# Patient Record
Sex: FEMALE | Race: WHITE | URBAN - METROPOLITAN AREA
[De-identification: names, ages, dates, MRNs, and addresses within clinical notes are randomized per-mention and may not be internally consistent; named-entity substitution may affect disease eponyms.]

---

## 2017-04-25 ENCOUNTER — OUTPATIENT (OUTPATIENT)
Dept: OUTPATIENT SERVICES | Facility: HOSPITAL | Age: 57
LOS: 1 days | Discharge: HOME | End: 2017-04-25

## 2017-06-28 DIAGNOSIS — Z12.31 ENCOUNTER FOR SCREENING MAMMOGRAM FOR MALIGNANT NEOPLASM OF BREAST: ICD-10-CM

## 2017-10-19 ENCOUNTER — OUTPATIENT (OUTPATIENT)
Dept: OUTPATIENT SERVICES | Facility: HOSPITAL | Age: 57
LOS: 1 days | Discharge: HOME | End: 2017-10-19

## 2017-10-19 DIAGNOSIS — E05.90 THYROTOXICOSIS, UNSPECIFIED WITHOUT THYROTOXIC CRISIS OR STORM: ICD-10-CM

## 2017-10-19 DIAGNOSIS — R10.9 UNSPECIFIED ABDOMINAL PAIN: ICD-10-CM

## 2017-10-19 DIAGNOSIS — J18.9 PNEUMONIA, UNSPECIFIED ORGANISM: ICD-10-CM

## 2017-10-19 DIAGNOSIS — J45.909 UNSPECIFIED ASTHMA, UNCOMPLICATED: ICD-10-CM

## 2017-10-21 ENCOUNTER — EMERGENCY (EMERGENCY)
Facility: HOSPITAL | Age: 57
LOS: 0 days | Discharge: HOME | End: 2017-10-21

## 2017-10-21 DIAGNOSIS — M54.5 LOW BACK PAIN: ICD-10-CM

## 2017-10-21 DIAGNOSIS — Z91.09 OTHER ALLERGY STATUS, OTHER THAN TO DRUGS AND BIOLOGICAL SUBSTANCES: ICD-10-CM

## 2017-10-21 DIAGNOSIS — S39.012A STRAIN OF MUSCLE, FASCIA AND TENDON OF LOWER BACK, INITIAL ENCOUNTER: ICD-10-CM

## 2017-10-21 DIAGNOSIS — J18.9 PNEUMONIA, UNSPECIFIED ORGANISM: ICD-10-CM

## 2017-10-21 DIAGNOSIS — E05.90 THYROTOXICOSIS, UNSPECIFIED WITHOUT THYROTOXIC CRISIS OR STORM: ICD-10-CM

## 2017-10-21 DIAGNOSIS — K21.9 GASTRO-ESOPHAGEAL REFLUX DISEASE WITHOUT ESOPHAGITIS: ICD-10-CM

## 2017-10-21 DIAGNOSIS — E03.9 HYPOTHYROIDISM, UNSPECIFIED: ICD-10-CM

## 2017-10-21 DIAGNOSIS — J45.909 UNSPECIFIED ASTHMA, UNCOMPLICATED: ICD-10-CM

## 2017-10-21 DIAGNOSIS — Z79.899 OTHER LONG TERM (CURRENT) DRUG THERAPY: ICD-10-CM

## 2017-10-21 DIAGNOSIS — Z88.0 ALLERGY STATUS TO PENICILLIN: ICD-10-CM
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
## 2017-10-21 DIAGNOSIS — Z86.19 PERSONAL HISTORY OF OTHER INFECTIOUS AND PARASITIC DISEASES: ICD-10-CM

## 2017-11-05 ENCOUNTER — TRANSCRIPTION ENCOUNTER (OUTPATIENT)
Age: 57
End: 2017-11-05

## 2017-11-13 ENCOUNTER — OUTPATIENT (OUTPATIENT)
Dept: OUTPATIENT SERVICES | Facility: HOSPITAL | Age: 57
LOS: 1 days | Discharge: HOME | End: 2017-11-13

## 2017-11-13 DIAGNOSIS — J18.9 PNEUMONIA, UNSPECIFIED ORGANISM: ICD-10-CM

## 2017-11-13 DIAGNOSIS — E05.90 THYROTOXICOSIS, UNSPECIFIED WITHOUT THYROTOXIC CRISIS OR STORM: ICD-10-CM

## 2017-11-13 DIAGNOSIS — J45.909 UNSPECIFIED ASTHMA, UNCOMPLICATED: ICD-10-CM

## 2017-11-16 DIAGNOSIS — Z88.0 ALLERGY STATUS TO PENICILLIN: ICD-10-CM

## 2017-11-16 DIAGNOSIS — D12.8 BENIGN NEOPLASM OF RECTUM: ICD-10-CM

## 2017-11-16 DIAGNOSIS — D12.3 BENIGN NEOPLASM OF TRANSVERSE COLON: ICD-10-CM

## 2017-11-16 DIAGNOSIS — D13.1 BENIGN NEOPLASM OF STOMACH: ICD-10-CM

## 2017-11-16 DIAGNOSIS — D12.4 BENIGN NEOPLASM OF DESCENDING COLON: ICD-10-CM

## 2017-11-16 DIAGNOSIS — Z12.11 ENCOUNTER FOR SCREENING FOR MALIGNANT NEOPLASM OF COLON: ICD-10-CM

## 2017-11-16 DIAGNOSIS — K29.80 DUODENITIS WITHOUT BLEEDING: ICD-10-CM

## 2017-11-16 DIAGNOSIS — K64.4 RESIDUAL HEMORRHOIDAL SKIN TAGS: ICD-10-CM

## 2017-11-16 DIAGNOSIS — K29.50 UNSPECIFIED CHRONIC GASTRITIS WITHOUT BLEEDING: ICD-10-CM

## 2018-01-08 ENCOUNTER — OUTPATIENT (OUTPATIENT)
Dept: OUTPATIENT SERVICES | Facility: HOSPITAL | Age: 58
LOS: 1 days | Discharge: HOME | End: 2018-01-08

## 2018-01-08 DIAGNOSIS — J45.909 UNSPECIFIED ASTHMA, UNCOMPLICATED: ICD-10-CM

## 2018-01-08 DIAGNOSIS — E05.90 THYROTOXICOSIS, UNSPECIFIED WITHOUT THYROTOXIC CRISIS OR STORM: ICD-10-CM

## 2018-01-08 DIAGNOSIS — R10.9 UNSPECIFIED ABDOMINAL PAIN: ICD-10-CM

## 2018-01-08 DIAGNOSIS — J18.9 PNEUMONIA, UNSPECIFIED ORGANISM: ICD-10-CM

## 2018-06-15 ENCOUNTER — EMERGENCY (EMERGENCY)
Facility: HOSPITAL | Age: 58
LOS: 0 days | Discharge: HOME | End: 2018-06-15
Attending: EMERGENCY MEDICINE | Admitting: EMERGENCY MEDICINE

## 2018-06-15 VITALS
RESPIRATION RATE: 19 BRPM | SYSTOLIC BLOOD PRESSURE: 122 MMHG | TEMPERATURE: 98 F | OXYGEN SATURATION: 98 % | HEART RATE: 84 BPM | DIASTOLIC BLOOD PRESSURE: 60 MMHG

## 2018-06-15 DIAGNOSIS — Z91.018 ALLERGY TO OTHER FOODS: ICD-10-CM

## 2018-06-15 DIAGNOSIS — R53.1 WEAKNESS: ICD-10-CM

## 2018-06-15 DIAGNOSIS — R53.83 OTHER FATIGUE: ICD-10-CM

## 2018-06-15 DIAGNOSIS — J45.909 UNSPECIFIED ASTHMA, UNCOMPLICATED: ICD-10-CM

## 2018-06-15 DIAGNOSIS — Z88.0 ALLERGY STATUS TO PENICILLIN: ICD-10-CM

## 2018-06-15 DIAGNOSIS — J02.9 ACUTE PHARYNGITIS, UNSPECIFIED: ICD-10-CM

## 2018-06-15 LAB
ALBUMIN SERPL ELPH-MCNC: 4.1 G/DL — SIGNIFICANT CHANGE UP (ref 3.5–5.2)
ALP SERPL-CCNC: 89 U/L — SIGNIFICANT CHANGE UP (ref 30–115)
ALT FLD-CCNC: 39 U/L — SIGNIFICANT CHANGE UP (ref 0–41)
ANION GAP SERPL CALC-SCNC: 13 MMOL/L — SIGNIFICANT CHANGE UP (ref 7–14)
AST SERPL-CCNC: 31 U/L — SIGNIFICANT CHANGE UP (ref 0–41)
BILIRUB SERPL-MCNC: 0.3 MG/DL — SIGNIFICANT CHANGE UP (ref 0.2–1.2)
BUN SERPL-MCNC: 12 MG/DL — SIGNIFICANT CHANGE UP (ref 10–20)
CALCIUM SERPL-MCNC: 8.7 MG/DL — SIGNIFICANT CHANGE UP (ref 8.5–10.1)
CHLORIDE SERPL-SCNC: 100 MMOL/L — SIGNIFICANT CHANGE UP (ref 98–110)
CK MB CFR SERPL CALC: <1 NG/ML — SIGNIFICANT CHANGE UP (ref 0.6–6.3)
CK SERPL-CCNC: 61 U/L — SIGNIFICANT CHANGE UP (ref 0–225)
CO2 SERPL-SCNC: 28 MMOL/L — SIGNIFICANT CHANGE UP (ref 17–32)
CREAT SERPL-MCNC: 0.7 MG/DL — SIGNIFICANT CHANGE UP (ref 0.7–1.5)
GLUCOSE SERPL-MCNC: 109 MG/DL — HIGH (ref 70–99)
HCT VFR BLD CALC: 37.9 % — SIGNIFICANT CHANGE UP (ref 37–47)
HGB BLD-MCNC: 12.4 G/DL — SIGNIFICANT CHANGE UP (ref 12–16)
MCHC RBC-ENTMCNC: 30.5 PG — SIGNIFICANT CHANGE UP (ref 27–31)
MCHC RBC-ENTMCNC: 32.7 G/DL — SIGNIFICANT CHANGE UP (ref 32–37)
MCV RBC AUTO: 93.1 FL — SIGNIFICANT CHANGE UP (ref 81–99)
NRBC # BLD: 0 /100 WBCS — SIGNIFICANT CHANGE UP (ref 0–0)
PLATELET # BLD AUTO: 203 K/UL — SIGNIFICANT CHANGE UP (ref 130–400)
POTASSIUM SERPL-MCNC: 3.8 MMOL/L — SIGNIFICANT CHANGE UP (ref 3.5–5)
POTASSIUM SERPL-SCNC: 3.8 MMOL/L — SIGNIFICANT CHANGE UP (ref 3.5–5)
PROT SERPL-MCNC: 6.7 G/DL — SIGNIFICANT CHANGE UP (ref 6–8)
RBC # BLD: 4.07 M/UL — LOW (ref 4.2–5.4)
RBC # FLD: 12.7 % — SIGNIFICANT CHANGE UP (ref 11.5–14.5)
SODIUM SERPL-SCNC: 141 MMOL/L — SIGNIFICANT CHANGE UP (ref 135–146)
TROPONIN T SERPL-MCNC: <0.01 NG/ML — SIGNIFICANT CHANGE UP
WBC # BLD: 7.44 K/UL — SIGNIFICANT CHANGE UP (ref 4.8–10.8)
WBC # FLD AUTO: 7.44 K/UL — SIGNIFICANT CHANGE UP (ref 4.8–10.8)

## 2018-06-15 RX ORDER — SODIUM CHLORIDE 9 MG/ML
3 INJECTION INTRAMUSCULAR; INTRAVENOUS; SUBCUTANEOUS ONCE
Qty: 0 | Refills: 0 | Status: COMPLETED | OUTPATIENT
Start: 2018-06-15 | End: 2018-06-15

## 2018-06-15 RX ADMIN — SODIUM CHLORIDE 3 MILLILITER(S): 9 INJECTION INTRAMUSCULAR; INTRAVENOUS; SUBCUTANEOUS at 12:29

## 2018-06-15 NOTE — ED PROVIDER NOTE - PHYSICAL EXAMINATION
VITAL SIGNS: I have reviewed the initial vital signs.   CONSTITUTIONAL: Awake, alert. Well-developed; well-nourished; in no distress. Non-toxic appearing.   SKIN: No rash, vesicles/lesion, abrasions or lacerations. No ecchymosis or signs of trauma.   HEAD: Normocephalic; atraumatic.   EYES: Symmetrical, no discharge or signs of trauma. Conjunctiva and sclera clear.   ENT: Airway patent. MMM. Oropharynx clear with erythema, no exudates or tonsillar enlargement. Uvula midline.   NECK: Supple; non-tender. + shotty lymphadenopathy.   CARD: No chest wall deformity or tenderness. S1, S2 normal; no murmurs, gallops, or rubs. Regular rate and rhythm.  RESP: Good air movement. Lungs CTAB. No crackles, wheezes, rales or rhonchi.  ABD: Soft; non-distended; non-tender.   EXT: No bony deformity or tenderness. Normal ROM x 4 extremities. No LE edema.

## 2018-06-15 NOTE — ED PROVIDER NOTE - NS ED ROS FT
Except as documented in HPI, all other ROS negative.   GENERAL: Denies fever/chills, loss of appetite/weight or fatigue.  SKIN: Denies rashes, abrasions, lacerations, ecchymosis, erythema, or edema.  HEAD: Denies headache, dizziness or trauma.  EYES: Denies blurry vision, diplopia, or photophobia.  ENT: + sore throat.   CARDIAC: Denies chest pain, palpitations, or SOB.   RESPIRATORY: Denies SOB, cough, hemoptysis or wheezing.   GI: Denies abdominal pain, n/v/d.   : Denies hematuria, dysuria or frequency.   MSK: Denies myalgias, bony deformity or pain.   NEURO: + weakness, + fatigue.

## 2018-06-15 NOTE — ED PROVIDER NOTE - OBJECTIVE STATEMENT
Pt is a 58 y/o Female, PMHX of Asthma, hyperthyroidism, presents to ED w/ weakness and fatigue. Pt reports she has had hx of PNA twice in the past, both which presented with fatigue initially and the second one she was diagnosed with MRSA which had to be drained by pulmonary. Pt states 3 days ago she developed fever of 100.1 and sore throat. 2 days [place] [facility] [place] on Z-Gilberto (still currently taking) and has been taking Motrin/Tylenol for the fever. Pt states she's just worried she could have PNA again. Pt denies cough, chest pain, SOB, abdominal pain, n/v, paresthesias.

## 2018-07-10 ENCOUNTER — EMERGENCY (EMERGENCY)
Facility: HOSPITAL | Age: 58
LOS: 0 days | Discharge: HOME | End: 2018-07-11
Attending: EMERGENCY MEDICINE | Admitting: EMERGENCY MEDICINE

## 2018-07-10 VITALS
TEMPERATURE: 98 F | OXYGEN SATURATION: 99 % | SYSTOLIC BLOOD PRESSURE: 108 MMHG | DIASTOLIC BLOOD PRESSURE: 67 MMHG | HEART RATE: 78 BPM | RESPIRATION RATE: 20 BRPM

## 2018-07-10 DIAGNOSIS — R11.0 NAUSEA: ICD-10-CM

## 2018-07-10 DIAGNOSIS — Z79.899 OTHER LONG TERM (CURRENT) DRUG THERAPY: ICD-10-CM

## 2018-07-10 DIAGNOSIS — K65.4 SCLEROSING MESENTERITIS: ICD-10-CM

## 2018-07-10 DIAGNOSIS — E05.90 THYROTOXICOSIS, UNSPECIFIED WITHOUT THYROTOXIC CRISIS OR STORM: ICD-10-CM

## 2018-07-10 DIAGNOSIS — R50.9 FEVER, UNSPECIFIED: ICD-10-CM

## 2018-07-10 DIAGNOSIS — J45.909 UNSPECIFIED ASTHMA, UNCOMPLICATED: ICD-10-CM

## 2018-07-10 DIAGNOSIS — K21.9 GASTRO-ESOPHAGEAL REFLUX DISEASE WITHOUT ESOPHAGITIS: ICD-10-CM

## 2018-07-10 DIAGNOSIS — Z91.018 ALLERGY TO OTHER FOODS: ICD-10-CM

## 2018-07-10 DIAGNOSIS — Z88.0 ALLERGY STATUS TO PENICILLIN: ICD-10-CM

## 2018-07-10 DIAGNOSIS — R10.9 UNSPECIFIED ABDOMINAL PAIN: ICD-10-CM

## 2018-07-10 LAB
ALBUMIN SERPL ELPH-MCNC: 4.1 G/DL — SIGNIFICANT CHANGE UP (ref 3.5–5.2)
ALP SERPL-CCNC: 74 U/L — SIGNIFICANT CHANGE UP (ref 30–115)
ALT FLD-CCNC: 25 U/L — SIGNIFICANT CHANGE UP (ref 0–41)
ANION GAP SERPL CALC-SCNC: 12 MMOL/L — SIGNIFICANT CHANGE UP (ref 7–14)
AST SERPL-CCNC: 29 U/L — SIGNIFICANT CHANGE UP (ref 0–41)
BILIRUB DIRECT SERPL-MCNC: <0.2 MG/DL — SIGNIFICANT CHANGE UP (ref 0–0.2)
BILIRUB INDIRECT FLD-MCNC: >0.3 MG/DL — SIGNIFICANT CHANGE UP (ref 0.2–1.2)
BILIRUB SERPL-MCNC: 0.4 MG/DL — SIGNIFICANT CHANGE UP (ref 0.2–1.2)
BILIRUB SERPL-MCNC: 0.5 MG/DL — SIGNIFICANT CHANGE UP (ref 0.2–1.2)
BUN SERPL-MCNC: 14 MG/DL — SIGNIFICANT CHANGE UP (ref 10–20)
CALCIUM SERPL-MCNC: 8.9 MG/DL — SIGNIFICANT CHANGE UP (ref 8.5–10.1)
CHLORIDE SERPL-SCNC: 100 MMOL/L — SIGNIFICANT CHANGE UP (ref 98–110)
CO2 SERPL-SCNC: 28 MMOL/L — SIGNIFICANT CHANGE UP (ref 17–32)
CREAT SERPL-MCNC: 0.7 MG/DL — SIGNIFICANT CHANGE UP (ref 0.7–1.5)
GLUCOSE SERPL-MCNC: 107 MG/DL — HIGH (ref 70–99)
HCT VFR BLD CALC: 39.8 % — SIGNIFICANT CHANGE UP (ref 37–47)
HGB BLD-MCNC: 13.1 G/DL — SIGNIFICANT CHANGE UP (ref 12–16)
LIDOCAIN IGE QN: 42 U/L — SIGNIFICANT CHANGE UP (ref 7–60)
MCHC RBC-ENTMCNC: 30.6 PG — SIGNIFICANT CHANGE UP (ref 27–31)
MCHC RBC-ENTMCNC: 32.9 G/DL — SIGNIFICANT CHANGE UP (ref 32–37)
MCV RBC AUTO: 93 FL — SIGNIFICANT CHANGE UP (ref 81–99)
PLATELET # BLD AUTO: 166 K/UL — SIGNIFICANT CHANGE UP (ref 130–400)
POTASSIUM SERPL-MCNC: 4.7 MMOL/L — SIGNIFICANT CHANGE UP (ref 3.5–5)
POTASSIUM SERPL-SCNC: 4.7 MMOL/L — SIGNIFICANT CHANGE UP (ref 3.5–5)
PROT SERPL-MCNC: 6.6 G/DL — SIGNIFICANT CHANGE UP (ref 6–8)
RBC # BLD: 4.28 M/UL — SIGNIFICANT CHANGE UP (ref 4.2–5.4)
RBC # FLD: 12.9 % — SIGNIFICANT CHANGE UP (ref 11.5–14.5)
SODIUM SERPL-SCNC: 140 MMOL/L — SIGNIFICANT CHANGE UP (ref 135–146)
WBC # BLD: 6.91 K/UL — SIGNIFICANT CHANGE UP (ref 4.8–10.8)
WBC # FLD AUTO: 6.91 K/UL — SIGNIFICANT CHANGE UP (ref 4.8–10.8)

## 2018-07-10 RX ORDER — SODIUM CHLORIDE 9 MG/ML
1000 INJECTION INTRAMUSCULAR; INTRAVENOUS; SUBCUTANEOUS ONCE
Qty: 0 | Refills: 0 | Status: COMPLETED | OUTPATIENT
Start: 2018-07-10 | End: 2018-07-10

## 2018-07-10 RX ORDER — ONDANSETRON 8 MG/1
4 TABLET, FILM COATED ORAL ONCE
Qty: 0 | Refills: 0 | Status: COMPLETED | OUTPATIENT
Start: 2018-07-10 | End: 2018-07-10

## 2018-07-10 RX ORDER — KETOROLAC TROMETHAMINE 30 MG/ML
30 SYRINGE (ML) INJECTION ONCE
Qty: 0 | Refills: 0 | Status: DISCONTINUED | OUTPATIENT
Start: 2018-07-10 | End: 2018-07-10

## 2018-07-10 RX ADMIN — ONDANSETRON 4 MILLIGRAM(S): 8 TABLET, FILM COATED ORAL at 23:16

## 2018-07-10 RX ADMIN — SODIUM CHLORIDE 1000 MILLILITER(S): 9 INJECTION INTRAMUSCULAR; INTRAVENOUS; SUBCUTANEOUS at 23:16

## 2018-07-10 RX ADMIN — Medication 30 MILLIGRAM(S): at 23:16

## 2018-07-10 NOTE — ED ADULT NURSE NOTE - PMH
Asthma    Diverticulitis    Hyperthyroidism Asthma    Diverticulitis    Hiatal hernia    Hyperthyroidism    MRSA infection  right lung

## 2018-07-11 ENCOUNTER — EMERGENCY (EMERGENCY)
Facility: HOSPITAL | Age: 58
LOS: 0 days | Discharge: HOME | End: 2018-07-12
Attending: EMERGENCY MEDICINE | Admitting: EMERGENCY MEDICINE

## 2018-07-11 VITALS
TEMPERATURE: 99 F | RESPIRATION RATE: 18 BRPM | SYSTOLIC BLOOD PRESSURE: 102 MMHG | DIASTOLIC BLOOD PRESSURE: 48 MMHG | WEIGHT: 130.07 LBS | HEIGHT: 65 IN | HEART RATE: 86 BPM | OXYGEN SATURATION: 99 %

## 2018-07-11 VITALS
DIASTOLIC BLOOD PRESSURE: 58 MMHG | HEART RATE: 77 BPM | OXYGEN SATURATION: 97 % | RESPIRATION RATE: 16 BRPM | SYSTOLIC BLOOD PRESSURE: 120 MMHG

## 2018-07-11 DIAGNOSIS — R10.9 UNSPECIFIED ABDOMINAL PAIN: ICD-10-CM

## 2018-07-11 DIAGNOSIS — K59.00 CONSTIPATION, UNSPECIFIED: ICD-10-CM

## 2018-07-11 DIAGNOSIS — Z79.899 OTHER LONG TERM (CURRENT) DRUG THERAPY: ICD-10-CM

## 2018-07-11 DIAGNOSIS — Z91.018 ALLERGY TO OTHER FOODS: ICD-10-CM

## 2018-07-11 DIAGNOSIS — K65.4 SCLEROSING MESENTERITIS: ICD-10-CM

## 2018-07-11 DIAGNOSIS — Z88.0 ALLERGY STATUS TO PENICILLIN: ICD-10-CM

## 2018-07-11 LAB
APPEARANCE UR: CLEAR — SIGNIFICANT CHANGE UP
APTT BLD: 29.8 SEC — SIGNIFICANT CHANGE UP (ref 27–39.2)
BASOPHILS # BLD AUTO: 0.01 K/UL — SIGNIFICANT CHANGE UP (ref 0–0.2)
BASOPHILS NFR BLD AUTO: 0.1 % — SIGNIFICANT CHANGE UP (ref 0–1)
BILIRUB UR-MCNC: NEGATIVE — SIGNIFICANT CHANGE UP
COLOR SPEC: YELLOW — SIGNIFICANT CHANGE UP
DIFF PNL FLD: NEGATIVE — SIGNIFICANT CHANGE UP
EOSINOPHIL # BLD AUTO: 0.02 K/UL — SIGNIFICANT CHANGE UP (ref 0–0.7)
EOSINOPHIL NFR BLD AUTO: 0.3 % — SIGNIFICANT CHANGE UP (ref 0–8)
GLUCOSE UR QL: NEGATIVE MG/DL — SIGNIFICANT CHANGE UP
IMM GRANULOCYTES NFR BLD AUTO: 0.3 % — SIGNIFICANT CHANGE UP (ref 0.1–0.3)
INR BLD: 1.25 RATIO — SIGNIFICANT CHANGE UP (ref 0.65–1.3)
KETONES UR-MCNC: 15
LACTATE SERPL-SCNC: 1.6 MMOL/L — SIGNIFICANT CHANGE UP (ref 0.5–2.2)
LEUKOCYTE ESTERASE UR-ACNC: (no result)
LYMPHOCYTES # BLD AUTO: 0.28 K/UL — LOW (ref 1.2–3.4)
LYMPHOCYTES # BLD AUTO: 4.1 % — LOW (ref 20.5–51.1)
MONOCYTES # BLD AUTO: 0.56 K/UL — SIGNIFICANT CHANGE UP (ref 0.1–0.6)
MONOCYTES NFR BLD AUTO: 8.1 % — SIGNIFICANT CHANGE UP (ref 1.7–9.3)
NEUTROPHILS # BLD AUTO: 6.02 K/UL — SIGNIFICANT CHANGE UP (ref 1.4–6.5)
NEUTROPHILS NFR BLD AUTO: 87.1 % — HIGH (ref 42.2–75.2)
NITRITE UR-MCNC: NEGATIVE — SIGNIFICANT CHANGE UP
NRBC # BLD: 0 /100 WBCS — SIGNIFICANT CHANGE UP (ref 0–0)
PH UR: 6 — SIGNIFICANT CHANGE UP (ref 5–8)
PROT UR-MCNC: NEGATIVE MG/DL — SIGNIFICANT CHANGE UP
PROTHROM AB SERPL-ACNC: 13.5 SEC — HIGH (ref 9.95–12.87)
SP GR SPEC: 1.01 — SIGNIFICANT CHANGE UP (ref 1.01–1.03)
UROBILINOGEN FLD QL: 0.2 MG/DL — SIGNIFICANT CHANGE UP (ref 0.2–0.2)
WBC UR QL: SIGNIFICANT CHANGE UP /HPF

## 2018-07-11 RX ORDER — SODIUM CHLORIDE 9 MG/ML
1000 INJECTION, SOLUTION INTRAVENOUS ONCE
Qty: 0 | Refills: 0 | Status: COMPLETED | OUTPATIENT
Start: 2018-07-11 | End: 2018-07-11

## 2018-07-11 RX ORDER — ONDANSETRON 8 MG/1
8 TABLET, FILM COATED ORAL ONCE
Qty: 0 | Refills: 0 | Status: COMPLETED | OUTPATIENT
Start: 2018-07-11 | End: 2018-07-11

## 2018-07-11 RX ORDER — DIPHENHYDRAMINE HYDROCHLORIDE AND LIDOCAINE HYDROCHLORIDE AND ALUMINUM HYDROXIDE AND MAGNESIUM HYDRO
30 KIT ONCE
Qty: 0 | Refills: 0 | Status: COMPLETED | OUTPATIENT
Start: 2018-07-11 | End: 2018-07-11

## 2018-07-11 RX ORDER — MORPHINE SULFATE 50 MG/1
4 CAPSULE, EXTENDED RELEASE ORAL ONCE
Qty: 0 | Refills: 0 | Status: DISCONTINUED | OUTPATIENT
Start: 2018-07-11 | End: 2018-07-11

## 2018-07-11 NOTE — ED PROVIDER NOTE - PROGRESS NOTE DETAILS
pain resolved, resting comfortably, tolerating PO liquids. Labs reviewed, no acute abnormalities.    Discussed findings with patient and . Given follow up this afternoon with Dr. William, normal labs and lack of findings on CT from yesterday, no indication for repeat CT imaging.     Recommend clear liquids, zofran prn and continued GI follow up.

## 2018-07-11 NOTE — ED PROVIDER NOTE - PHYSICAL EXAMINATION
Vital Signs: I have reviewed the initial vital signs.  Constitutional: well-nourished, uncomfortable appearing, normocephalic  Eyes: PERRLA, clear conjunctiva  ENT: MMM  Cardiovascular: regular rate, regular rhythm, no murmur appreciated  Respiratory: unlabored respiratory effort, clear to auscultation bilaterally  Gastrointestinal: soft, diffuse tenderness greatest in epigastrium but distractable to tenderness while ausculating for bowel sounds, non-distended  abdomen, no pulsatile mass, (+)BS.    Musculoskeletal: supple neck, no lower extremity edema, no bony tenderness  Integumentary: warm, dry, no rash  Neurologic: awake, alert, motor and sensory functions grossly intact, no focal deficits, GCS 15  Psychiatric: appropriate mood, appropriate affect

## 2018-07-11 NOTE — ED PROVIDER NOTE - NS ED ROS FT
Constitutional: No fever, chills, unintended weight loss.  Eyes:  No visual changes, eye pain or discharge.  ENMT:  No hearing changes, pain, no sore throat or runny nose, no difficulty swallowing  Cardiac:  No chest pain, SOB or edema. No chest pain with exertion.  Respiratory:  No cough or respiratory distress. No hemoptysis. No history of asthma or RAD.  GI:  see hpi  :  No dysuria, frequency or burning.  MS:  No myalgia, muscle weakness, joint pain or back pain.  Neuro:  No headache or weakness.  No LOC.  Skin:  No skin rash.   Endocrine: +hyperthyroidism, no diabetes.

## 2018-07-11 NOTE — ED PROVIDER NOTE - OBJECTIVE STATEMENT
57yF with hx of GERD, hiatal hernia, diverticulitis in past, surgical hx of appendectomy distant presents to ED for abd pain.  Pt states that 2 days ago she developed lower abd pain.  Pt was seen and evaluated in [place] ED yesterday, chart reviewed, had CT showing sclerosing mesenteritis.  Pt improved with ED treatment and was DCd.  Pt this AM was seen by her PMD and was advised to f/u with GI for further evaluation.  Pt has appointment scheduled but tonight developed worsening pain so comes to ED for eval.  Pain is a/w nausea and vomiting.  No diarrhea.  Pt has not had BM in 2 days.  No back pain.  No CP, SOB. 57yF with hx of GERD, hiatal hernia, diverticulitis in past, surgical hx of appendectomy distant presents to ED for abd pain.  Pt states that 2 days ago she developed lower abd pain.  Pt was seen and evaluated in [place] ED yesterday, chart reviewed, had CT showing sclerosing mesenteritis (which also was present on a CT scan from 2010, though slightly worse yesterday).  Pt improved with ED treatment and was DCd.  Pt this AM was seen by her PMD and was advised to f/u with GI for further evaluation.  Pt has appointment scheduled for later this afternoon, but tonight developed worsening pain so comes to ED for eval.  Pain is a/w nausea and retching but no kate vomiting.  No diarrhea.  Pt has not had BM in 2 days, has chronic constipation.  No back pain.  No CP, SOB.    Reports subjective fever but no chills, no documented fevers.

## 2018-07-11 NOTE — ED PROVIDER NOTE - OBJECTIVE STATEMENT
57y f h/o ashthma, gerd, hiatal hernia, hyperthyroidism, diverticulitis in past (dx "over the phone" by her prior GI Dr. Almonte) no surgeries p/w abd pain x 1d. Described as constant LLQ pain since this morning, occ radiating to rest of lower abd, accomp by nausea & subj fever. Denies cp/sob, v/d, melena/brbpr, flank pain, urinary sx, rash. No sick contacts, recent travel or abx. Currently followed by GI Dr. William, has EGD/colonoscopy appx 1y ago showing polyps. Has outpt appt scheduled with her in 2d.

## 2018-07-11 NOTE — ED ADULT TRIAGE NOTE - CHIEF COMPLAINT QUOTE
pt c/o severe abdominal pain that started 2 days ago, was recently d/c yesterday for the same symptoms.

## 2018-07-11 NOTE — ED PROVIDER NOTE - PROGRESS NOTE DETAILS
called to CT for pt refusing IV contrast, agreed after reassurance, then refused again while on table - done w/o con all results d/w pt and copies given - return precautions discussed, encouraged close f/u with GI Dr. William as scheduled in 2d

## 2018-07-11 NOTE — ED PROVIDER NOTE - NS ED ROS FT
Review of Systems    Constitutional: (-) fever/ chills (-) weight loss  Eyes/ENT: (-) blurry vision, (-) epistaxis (-) sore throat (-) ear pain  Cardiovascular: (-) chest pain, (-) syncope (-) palpitations  Respiratory: (-) cough, (-) shortness of breath  Gastrointestinal: (+) vomiting, (-) diarrhea (+) abdominal pain  Musculoskeletal: (-) neck pain, (-) back pain, (-) joint pain (-) pedal edema   Integumentary: (-) rash, (-) swelling  Neurological: (-) headache, (-) altered mental status  Psychiatric: (-) hallucinations Review of Systems    Constitutional: see HPI  Eyes/ENT: (-) blurry vision, (-) epistaxis (-) sore throat (-) ear pain  Cardiovascular: (-) chest pain, (-) syncope (-) palpitations  Respiratory: (-) cough, (-) shortness of breath  Gastrointestinal: (+) vomiting, (-) diarrhea (+) abdominal pain  Musculoskeletal: (-) neck pain, (-) back pain, (-) joint pain (-) pedal edema   Integumentary: (-) rash, (-) swelling  Neurological: (-) headache, (-) altered mental status  Psychiatric: (-) hallucinations

## 2018-07-11 NOTE — ED PROVIDER NOTE - MEDICAL DECISION MAKING DETAILS
Persistent abdominal pain in setting of recent CT demonstrating sclerosing mesenteritis, normal labs. Patient appears to have long history of intermittent abdominal pain. Will repeat labs, give fluids, analgesia and reassess. Will hold off on repeat imaging at this time, no signs of obstruction

## 2018-07-11 NOTE — ED PROVIDER NOTE - PHYSICAL EXAMINATION
VITAL SIGNS: I have reviewed nursing notes and confirm.  CONSTITUTIONAL: Well-developed; well-nourished; in no acute distress.  SKIN: Skin exam is warm and dry, no acute rash.  HEAD: Normocephalic; atraumatic.  EYES: PERRL, EOM intact; conjunctiva and sclera clear.  ENT: No nasal discharge; airway clear.  NECK: Supple; non tender.  CARD: S1, S2 normal; no murmurs, gallops, or rubs. Regular rate and rhythm.  RESP: No wheezes, rales or rhonchi.  ABD: Normal bowel sounds; soft; non-distended; +ttp LLQ, rest non-tender; no hepatosplenomegaly; no rgr.  BACK: no cvat  EXT: Normal ROM. No clubbing, cyanosis or edema.  NEURO: Alert, oriented. Grossly unremarkable. No focal deficits.  PSYCH: Cooperative, appropriate.

## 2018-07-12 VITALS
SYSTOLIC BLOOD PRESSURE: 100 MMHG | DIASTOLIC BLOOD PRESSURE: 60 MMHG | TEMPERATURE: 101 F | RESPIRATION RATE: 18 BRPM | HEART RATE: 95 BPM | OXYGEN SATURATION: 98 %

## 2018-07-12 LAB
ALBUMIN SERPL ELPH-MCNC: 4 G/DL — SIGNIFICANT CHANGE UP (ref 3.5–5.2)
ALP SERPL-CCNC: 71 U/L — SIGNIFICANT CHANGE UP (ref 30–115)
ALT FLD-CCNC: 27 U/L — SIGNIFICANT CHANGE UP (ref 0–41)
ANION GAP SERPL CALC-SCNC: 15 MMOL/L — HIGH (ref 7–14)
APPEARANCE UR: CLEAR — SIGNIFICANT CHANGE UP
AST SERPL-CCNC: 43 U/L — HIGH (ref 0–41)
BASOPHILS # BLD AUTO: 0.02 K/UL — SIGNIFICANT CHANGE UP (ref 0–0.2)
BASOPHILS NFR BLD AUTO: 0.4 % — SIGNIFICANT CHANGE UP (ref 0–1)
BILIRUB SERPL-MCNC: 0.5 MG/DL — SIGNIFICANT CHANGE UP (ref 0.2–1.2)
BILIRUB UR-MCNC: NEGATIVE — SIGNIFICANT CHANGE UP
BUN SERPL-MCNC: 10 MG/DL — SIGNIFICANT CHANGE UP (ref 10–20)
CALCIUM SERPL-MCNC: 8.8 MG/DL — SIGNIFICANT CHANGE UP (ref 8.5–10.1)
CHLORIDE SERPL-SCNC: 98 MMOL/L — SIGNIFICANT CHANGE UP (ref 98–110)
CO2 SERPL-SCNC: 25 MMOL/L — SIGNIFICANT CHANGE UP (ref 17–32)
COLOR SPEC: YELLOW — SIGNIFICANT CHANGE UP
CREAT SERPL-MCNC: 0.7 MG/DL — SIGNIFICANT CHANGE UP (ref 0.7–1.5)
CULTURE RESULTS: SIGNIFICANT CHANGE UP
DIFF PNL FLD: NEGATIVE — SIGNIFICANT CHANGE UP
EOSINOPHIL # BLD AUTO: 0.05 K/UL — SIGNIFICANT CHANGE UP (ref 0–0.7)
EOSINOPHIL NFR BLD AUTO: 0.9 % — SIGNIFICANT CHANGE UP (ref 0–8)
GLUCOSE SERPL-MCNC: 90 MG/DL — SIGNIFICANT CHANGE UP (ref 70–99)
GLUCOSE UR QL: NEGATIVE — SIGNIFICANT CHANGE UP
HCT VFR BLD CALC: 37.9 % — SIGNIFICANT CHANGE UP (ref 37–47)
HGB BLD-MCNC: 12.8 G/DL — SIGNIFICANT CHANGE UP (ref 12–16)
IMM GRANULOCYTES NFR BLD AUTO: 0.4 % — HIGH (ref 0.1–0.3)
KETONES UR-MCNC: 80 — SIGNIFICANT CHANGE UP
LACTATE SERPL-SCNC: 1.5 MMOL/L — SIGNIFICANT CHANGE UP (ref 0.5–2.2)
LEUKOCYTE ESTERASE UR-ACNC: NEGATIVE — SIGNIFICANT CHANGE UP
LIDOCAIN IGE QN: 59 U/L — SIGNIFICANT CHANGE UP (ref 7–60)
LYMPHOCYTES # BLD AUTO: 0.34 K/UL — LOW (ref 1.2–3.4)
LYMPHOCYTES # BLD AUTO: 6.1 % — LOW (ref 20.5–51.1)
MCHC RBC-ENTMCNC: 30.5 PG — SIGNIFICANT CHANGE UP (ref 27–31)
MCHC RBC-ENTMCNC: 33.8 G/DL — SIGNIFICANT CHANGE UP (ref 32–37)
MCV RBC AUTO: 90.5 FL — SIGNIFICANT CHANGE UP (ref 81–99)
MONOCYTES # BLD AUTO: 0.47 K/UL — SIGNIFICANT CHANGE UP (ref 0.1–0.6)
MONOCYTES NFR BLD AUTO: 8.5 % — SIGNIFICANT CHANGE UP (ref 1.7–9.3)
NEUTROPHILS # BLD AUTO: 4.63 K/UL — SIGNIFICANT CHANGE UP (ref 1.4–6.5)
NEUTROPHILS NFR BLD AUTO: 83.7 % — HIGH (ref 42.2–75.2)
NITRITE UR-MCNC: NEGATIVE — SIGNIFICANT CHANGE UP
NRBC # BLD: 0 /100 WBCS — SIGNIFICANT CHANGE UP (ref 0–0)
PH UR: 7 — SIGNIFICANT CHANGE UP (ref 5–8)
PLATELET # BLD AUTO: 157 K/UL — SIGNIFICANT CHANGE UP (ref 130–400)
POTASSIUM SERPL-MCNC: 4.6 MMOL/L — SIGNIFICANT CHANGE UP (ref 3.5–5)
POTASSIUM SERPL-SCNC: 4.6 MMOL/L — SIGNIFICANT CHANGE UP (ref 3.5–5)
PROT SERPL-MCNC: 6.7 G/DL — SIGNIFICANT CHANGE UP (ref 6–8)
PROT UR-MCNC: NEGATIVE — SIGNIFICANT CHANGE UP
RBC # BLD: 4.19 M/UL — LOW (ref 4.2–5.4)
RBC # FLD: 12.6 % — SIGNIFICANT CHANGE UP (ref 11.5–14.5)
SODIUM SERPL-SCNC: 138 MMOL/L — SIGNIFICANT CHANGE UP (ref 135–146)
SP GR SPEC: 1.01 — SIGNIFICANT CHANGE UP (ref 1.01–1.03)
SPECIMEN SOURCE: SIGNIFICANT CHANGE UP
UROBILINOGEN FLD QL: 0.2 — SIGNIFICANT CHANGE UP (ref 0.2–0.2)
WBC # BLD: 5.53 K/UL — SIGNIFICANT CHANGE UP (ref 4.8–10.8)
WBC # FLD AUTO: 5.53 K/UL — SIGNIFICANT CHANGE UP (ref 4.8–10.8)

## 2018-07-12 RX ORDER — ONDANSETRON 8 MG/1
1 TABLET, FILM COATED ORAL
Qty: 12 | Refills: 0 | OUTPATIENT
Start: 2018-07-12 | End: 2018-07-15

## 2018-07-12 RX ORDER — ONDANSETRON 8 MG/1
4 TABLET, FILM COATED ORAL ONCE
Qty: 0 | Refills: 0 | Status: COMPLETED | OUTPATIENT
Start: 2018-07-12 | End: 2018-07-12

## 2018-07-12 RX ADMIN — ONDANSETRON 4 MILLIGRAM(S): 8 TABLET, FILM COATED ORAL at 03:06

## 2018-07-12 RX ADMIN — MORPHINE SULFATE 4 MILLIGRAM(S): 50 CAPSULE, EXTENDED RELEASE ORAL at 00:14

## 2018-07-12 RX ADMIN — ONDANSETRON 8 MILLIGRAM(S): 8 TABLET, FILM COATED ORAL at 00:14

## 2018-07-12 RX ADMIN — DIPHENHYDRAMINE HYDROCHLORIDE AND LIDOCAINE HYDROCHLORIDE AND ALUMINUM HYDROXIDE AND MAGNESIUM HYDRO 30 MILLILITER(S): KIT at 01:07

## 2018-07-12 RX ADMIN — MORPHINE SULFATE 4 MILLIGRAM(S): 50 CAPSULE, EXTENDED RELEASE ORAL at 00:44

## 2018-07-12 RX ADMIN — SODIUM CHLORIDE 2000 MILLILITER(S): 9 INJECTION, SOLUTION INTRAVENOUS at 00:12

## 2018-07-13 ENCOUNTER — EMERGENCY (EMERGENCY)
Facility: HOSPITAL | Age: 58
LOS: 0 days | Discharge: HOME | End: 2018-07-13
Attending: EMERGENCY MEDICINE | Admitting: EMERGENCY MEDICINE

## 2018-07-13 VITALS
SYSTOLIC BLOOD PRESSURE: 118 MMHG | OXYGEN SATURATION: 98 % | DIASTOLIC BLOOD PRESSURE: 76 MMHG | RESPIRATION RATE: 18 BRPM | HEART RATE: 72 BPM

## 2018-07-13 VITALS
HEART RATE: 91 BPM | RESPIRATION RATE: 18 BRPM | DIASTOLIC BLOOD PRESSURE: 73 MMHG | SYSTOLIC BLOOD PRESSURE: 107 MMHG | WEIGHT: 132.94 LBS | HEIGHT: 65 IN | TEMPERATURE: 97 F | OXYGEN SATURATION: 99 %

## 2018-07-13 DIAGNOSIS — J45.909 UNSPECIFIED ASTHMA, UNCOMPLICATED: ICD-10-CM

## 2018-07-13 DIAGNOSIS — R10.9 UNSPECIFIED ABDOMINAL PAIN: ICD-10-CM

## 2018-07-13 DIAGNOSIS — Z91.018 ALLERGY TO OTHER FOODS: ICD-10-CM

## 2018-07-13 DIAGNOSIS — K52.9 NONINFECTIVE GASTROENTERITIS AND COLITIS, UNSPECIFIED: ICD-10-CM

## 2018-07-13 DIAGNOSIS — E03.9 HYPOTHYROIDISM, UNSPECIFIED: ICD-10-CM

## 2018-07-13 DIAGNOSIS — Z91.041 RADIOGRAPHIC DYE ALLERGY STATUS: ICD-10-CM

## 2018-07-13 DIAGNOSIS — Z88.0 ALLERGY STATUS TO PENICILLIN: ICD-10-CM

## 2018-07-13 DIAGNOSIS — Z79.899 OTHER LONG TERM (CURRENT) DRUG THERAPY: ICD-10-CM

## 2018-07-13 LAB
ALBUMIN SERPL ELPH-MCNC: 3.8 G/DL — SIGNIFICANT CHANGE UP (ref 3.5–5.2)
ALP SERPL-CCNC: 61 U/L — SIGNIFICANT CHANGE UP (ref 30–115)
ALT FLD-CCNC: 33 U/L — SIGNIFICANT CHANGE UP (ref 0–41)
ANION GAP SERPL CALC-SCNC: 14 MMOL/L — SIGNIFICANT CHANGE UP (ref 7–14)
APPEARANCE UR: CLEAR — SIGNIFICANT CHANGE UP
APTT BLD: 26.1 SEC — LOW (ref 27–39.2)
AST SERPL-CCNC: 41 U/L — SIGNIFICANT CHANGE UP (ref 0–41)
BASOPHILS # BLD AUTO: 0 K/UL — SIGNIFICANT CHANGE UP (ref 0–0.2)
BASOPHILS NFR BLD AUTO: 0 % — SIGNIFICANT CHANGE UP (ref 0–1)
BILIRUB SERPL-MCNC: 0.3 MG/DL — SIGNIFICANT CHANGE UP (ref 0.2–1.2)
BILIRUB UR-MCNC: NEGATIVE — SIGNIFICANT CHANGE UP
BUN SERPL-MCNC: 11 MG/DL — SIGNIFICANT CHANGE UP (ref 10–20)
CALCIUM SERPL-MCNC: 8.5 MG/DL — SIGNIFICANT CHANGE UP (ref 8.5–10.1)
CHLORIDE SERPL-SCNC: 98 MMOL/L — SIGNIFICANT CHANGE UP (ref 98–110)
CO2 SERPL-SCNC: 28 MMOL/L — SIGNIFICANT CHANGE UP (ref 17–32)
COLOR SPEC: YELLOW — SIGNIFICANT CHANGE UP
CREAT SERPL-MCNC: 0.5 MG/DL — LOW (ref 0.7–1.5)
DIFF PNL FLD: NEGATIVE — SIGNIFICANT CHANGE UP
EOSINOPHIL # BLD AUTO: 0.22 K/UL — SIGNIFICANT CHANGE UP (ref 0–0.7)
EOSINOPHIL NFR BLD AUTO: 6 % — SIGNIFICANT CHANGE UP (ref 0–8)
GLUCOSE SERPL-MCNC: 88 MG/DL — SIGNIFICANT CHANGE UP (ref 70–99)
GLUCOSE UR QL: NEGATIVE — SIGNIFICANT CHANGE UP
HCT VFR BLD CALC: 34 % — LOW (ref 37–47)
HGB BLD-MCNC: 11.3 G/DL — LOW (ref 12–16)
INR BLD: 1.13 RATIO — SIGNIFICANT CHANGE UP (ref 0.65–1.3)
KETONES UR-MCNC: 40 — SIGNIFICANT CHANGE UP
LACTATE SERPL-SCNC: 1.6 MMOL/L — SIGNIFICANT CHANGE UP (ref 0.5–2.2)
LEUKOCYTE ESTERASE UR-ACNC: NEGATIVE — SIGNIFICANT CHANGE UP
LIDOCAIN IGE QN: 40 U/L — SIGNIFICANT CHANGE UP (ref 7–60)
LYMPHOCYTES # BLD AUTO: 0.85 K/UL — LOW (ref 1.2–3.4)
LYMPHOCYTES # BLD AUTO: 23 % — SIGNIFICANT CHANGE UP (ref 20.5–51.1)
MAGNESIUM SERPL-MCNC: 2 MG/DL — SIGNIFICANT CHANGE UP (ref 1.8–2.4)
MCHC RBC-ENTMCNC: 30.4 PG — SIGNIFICANT CHANGE UP (ref 27–31)
MCHC RBC-ENTMCNC: 33.2 G/DL — SIGNIFICANT CHANGE UP (ref 32–37)
MCV RBC AUTO: 91.4 FL — SIGNIFICANT CHANGE UP (ref 81–99)
MONOCYTES # BLD AUTO: 0.59 K/UL — SIGNIFICANT CHANGE UP (ref 0.1–0.6)
MONOCYTES NFR BLD AUTO: 16 % — HIGH (ref 1.7–9.3)
NEUTROPHILS # BLD AUTO: 2.02 K/UL — SIGNIFICANT CHANGE UP (ref 1.4–6.5)
NEUTROPHILS NFR BLD AUTO: 51 % — SIGNIFICANT CHANGE UP (ref 42.2–75.2)
NEUTS BAND # BLD: 4 % — SIGNIFICANT CHANGE UP (ref 0–6)
NITRITE UR-MCNC: NEGATIVE — SIGNIFICANT CHANGE UP
NRBC # BLD: 0 /100 — SIGNIFICANT CHANGE UP (ref 0–0)
NRBC # BLD: SIGNIFICANT CHANGE UP /100 WBCS (ref 0–0)
PH UR: 7 — SIGNIFICANT CHANGE UP (ref 5–8)
PLAT MORPH BLD: NORMAL — SIGNIFICANT CHANGE UP
PLATELET # BLD AUTO: 151 K/UL — SIGNIFICANT CHANGE UP (ref 130–400)
POTASSIUM SERPL-MCNC: 3.7 MMOL/L — SIGNIFICANT CHANGE UP (ref 3.5–5)
POTASSIUM SERPL-SCNC: 3.7 MMOL/L — SIGNIFICANT CHANGE UP (ref 3.5–5)
PROT SERPL-MCNC: 6.1 G/DL — SIGNIFICANT CHANGE UP (ref 6–8)
PROT UR-MCNC: NEGATIVE — SIGNIFICANT CHANGE UP
PROTHROM AB SERPL-ACNC: 12.2 SEC — SIGNIFICANT CHANGE UP (ref 9.95–12.87)
RBC # BLD: 3.72 M/UL — LOW (ref 4.2–5.4)
RBC # FLD: 12.8 % — SIGNIFICANT CHANGE UP (ref 11.5–14.5)
RBC BLD AUTO: NORMAL — SIGNIFICANT CHANGE UP
SODIUM SERPL-SCNC: 140 MMOL/L — SIGNIFICANT CHANGE UP (ref 135–146)
SP GR SPEC: 1.01 — SIGNIFICANT CHANGE UP (ref 1.01–1.03)
UROBILINOGEN FLD QL: 0.2 — SIGNIFICANT CHANGE UP (ref 0.2–0.2)
WBC # BLD: 3.68 K/UL — LOW (ref 4.8–10.8)
WBC # FLD AUTO: 3.68 K/UL — LOW (ref 4.8–10.8)

## 2018-07-13 RX ORDER — ONDANSETRON 8 MG/1
4 TABLET, FILM COATED ORAL ONCE
Qty: 0 | Refills: 0 | Status: COMPLETED | OUTPATIENT
Start: 2018-07-13 | End: 2018-07-13

## 2018-07-13 RX ORDER — METRONIDAZOLE 500 MG
500 TABLET ORAL ONCE
Qty: 0 | Refills: 0 | Status: COMPLETED | OUTPATIENT
Start: 2018-07-13 | End: 2018-07-13

## 2018-07-13 RX ORDER — DIPHENHYDRAMINE HCL 50 MG
25 CAPSULE ORAL ONCE
Qty: 0 | Refills: 0 | Status: COMPLETED | OUTPATIENT
Start: 2018-07-13 | End: 2018-07-13

## 2018-07-13 RX ORDER — MOXIFLOXACIN HYDROCHLORIDE TABLETS, 400 MG 400 MG/1
1 TABLET, FILM COATED ORAL
Qty: 28 | Refills: 0 | OUTPATIENT
Start: 2018-07-13 | End: 2018-07-26

## 2018-07-13 RX ORDER — SODIUM CHLORIDE 9 MG/ML
1000 INJECTION INTRAMUSCULAR; INTRAVENOUS; SUBCUTANEOUS ONCE
Qty: 0 | Refills: 0 | Status: COMPLETED | OUTPATIENT
Start: 2018-07-13 | End: 2018-07-13

## 2018-07-13 RX ORDER — CIPROFLOXACIN LACTATE 400MG/40ML
500 VIAL (ML) INTRAVENOUS ONCE
Qty: 0 | Refills: 0 | Status: COMPLETED | OUTPATIENT
Start: 2018-07-13 | End: 2018-07-13

## 2018-07-13 RX ORDER — METRONIDAZOLE 500 MG
1 TABLET ORAL
Qty: 42 | Refills: 0 | OUTPATIENT
Start: 2018-07-13 | End: 2018-07-26

## 2018-07-13 RX ORDER — IOHEXOL 300 MG/ML
30 INJECTION, SOLUTION INTRAVENOUS ONCE
Qty: 0 | Refills: 0 | Status: COMPLETED | OUTPATIENT
Start: 2018-07-13 | End: 2018-07-13

## 2018-07-13 RX ADMIN — Medication 25 MILLIGRAM(S): at 15:43

## 2018-07-13 RX ADMIN — ONDANSETRON 4 MILLIGRAM(S): 8 TABLET, FILM COATED ORAL at 12:27

## 2018-07-13 RX ADMIN — SODIUM CHLORIDE 1000 MILLILITER(S): 9 INJECTION INTRAMUSCULAR; INTRAVENOUS; SUBCUTANEOUS at 12:26

## 2018-07-13 RX ADMIN — Medication 125 MILLIGRAM(S): at 15:44

## 2018-07-13 RX ADMIN — IOHEXOL 30 MILLILITER(S): 300 INJECTION, SOLUTION INTRAVENOUS at 12:26

## 2018-07-13 RX ADMIN — Medication 500 MILLIGRAM(S): at 18:10

## 2018-07-13 NOTE — ED PROVIDER NOTE - MEDICAL DECISION MAKING DETAILS
dx testing reviewed.  po abx ordered.  Out patient follow up advised.  Copies of all diagnostic studies were given to patient to aid in follow up.

## 2018-07-13 NOTE — ED PROVIDER NOTE - OBJECTIVE STATEMENT
abdominal pain 3 days, Seen in ED and by GI, C/o frequent loose stools, fever, chills , no recent ABX use

## 2018-07-13 NOTE — ED ADULT TRIAGE NOTE - CHIEF COMPLAINT QUOTE
As per patient: " I feel spasm on my belly and I feel really weak. I also have diarrhea and I cannot hold food at all."

## 2018-07-14 ENCOUNTER — EMERGENCY (EMERGENCY)
Facility: HOSPITAL | Age: 58
LOS: 0 days | Discharge: HOME | End: 2018-07-14
Attending: EMERGENCY MEDICINE | Admitting: EMERGENCY MEDICINE
Payer: COMMERCIAL

## 2018-07-14 VITALS
SYSTOLIC BLOOD PRESSURE: 115 MMHG | OXYGEN SATURATION: 99 % | HEART RATE: 78 BPM | TEMPERATURE: 99 F | DIASTOLIC BLOOD PRESSURE: 78 MMHG | RESPIRATION RATE: 18 BRPM

## 2018-07-14 VITALS
SYSTOLIC BLOOD PRESSURE: 110 MMHG | TEMPERATURE: 97 F | HEART RATE: 75 BPM | HEIGHT: 65 IN | DIASTOLIC BLOOD PRESSURE: 60 MMHG | RESPIRATION RATE: 18 BRPM | WEIGHT: 132.06 LBS

## 2018-07-14 DIAGNOSIS — Z91.018 ALLERGY TO OTHER FOODS: ICD-10-CM

## 2018-07-14 DIAGNOSIS — Z91.041 RADIOGRAPHIC DYE ALLERGY STATUS: ICD-10-CM

## 2018-07-14 DIAGNOSIS — M76.62 ACHILLES TENDINITIS, LEFT LEG: ICD-10-CM

## 2018-07-14 DIAGNOSIS — Z88.0 ALLERGY STATUS TO PENICILLIN: ICD-10-CM

## 2018-07-14 DIAGNOSIS — J45.909 UNSPECIFIED ASTHMA, UNCOMPLICATED: ICD-10-CM

## 2018-07-14 DIAGNOSIS — Z79.2 LONG TERM (CURRENT) USE OF ANTIBIOTICS: ICD-10-CM

## 2018-07-14 DIAGNOSIS — M79.89 OTHER SPECIFIED SOFT TISSUE DISORDERS: ICD-10-CM

## 2018-07-14 DIAGNOSIS — Z79.899 OTHER LONG TERM (CURRENT) DRUG THERAPY: ICD-10-CM

## 2018-07-14 DIAGNOSIS — Z79.51 LONG TERM (CURRENT) USE OF INHALED STEROIDS: ICD-10-CM

## 2018-07-14 PROCEDURE — 93970 EXTREMITY STUDY: CPT | Mod: 26

## 2018-07-14 NOTE — ED PROVIDER NOTE - ATTENDING CONTRIBUTION TO CARE
Pt is a 56yo female on Cipro and Flagyl for colitis who comes in for L ankle burning and discomfort that began 1h ago.  No inciting event or injury.  No CP/SOB.  No other complaints.  She called Dr. William's office and was advised to come to ED to r/o DVT.    Exam: no calf tenderness, no LE edema, normal Melton test, 2+ DP pulses  Imp: tendinopathy s/p Cipro without rupture  Plan: duplex

## 2018-07-14 NOTE — ED PROVIDER NOTE - PHYSICAL EXAMINATION
msk: mild swelling to medial aspect of ankle/ no erythema / no bony tenderness/ mild tenderness to achilles but in tact connors test. negative homans sign

## 2018-07-14 NOTE — ED PROVIDER NOTE - OBJECTIVE STATEMENT
56 y/o female c/o ankle swelling on left x 1 day. patient seen in the Ed yesterday for abdominal pain given cipro, flagyl. patient denies any trauma to ankle or fall. patient c/o mild pain to left calf . no redness or streaking up leg

## 2018-08-20 PROBLEM — K44.9 DIAPHRAGMATIC HERNIA WITHOUT OBSTRUCTION OR GANGRENE: Chronic | Status: ACTIVE | Noted: 2018-07-10

## 2018-08-20 PROBLEM — E05.90 THYROTOXICOSIS, UNSPECIFIED WITHOUT THYROTOXIC CRISIS OR STORM: Chronic | Status: ACTIVE | Noted: 2018-07-10

## 2018-08-20 PROBLEM — K57.92 DIVERTICULITIS OF INTESTINE, PART UNSPECIFIED, WITHOUT PERFORATION OR ABSCESS WITHOUT BLEEDING: Chronic | Status: ACTIVE | Noted: 2018-07-10

## 2018-10-10 ENCOUNTER — RESULT REVIEW (OUTPATIENT)
Age: 58
End: 2018-10-10

## 2018-10-10 ENCOUNTER — OUTPATIENT (OUTPATIENT)
Dept: OUTPATIENT SERVICES | Facility: HOSPITAL | Age: 58
LOS: 1 days | Discharge: HOME | End: 2018-10-10

## 2018-10-10 VITALS
RESPIRATION RATE: 18 BRPM | HEIGHT: 65 IN | HEART RATE: 65 BPM | WEIGHT: 128.09 LBS | TEMPERATURE: 98 F | SYSTOLIC BLOOD PRESSURE: 115 MMHG | DIASTOLIC BLOOD PRESSURE: 59 MMHG | OXYGEN SATURATION: 97 %

## 2018-10-10 VITALS — HEART RATE: 67 BPM | SYSTOLIC BLOOD PRESSURE: 110 MMHG | RESPIRATION RATE: 18 BRPM | DIASTOLIC BLOOD PRESSURE: 70 MMHG

## 2018-10-12 LAB — SURGICAL PATHOLOGY STUDY: SIGNIFICANT CHANGE UP

## 2018-10-15 DIAGNOSIS — R10.9 UNSPECIFIED ABDOMINAL PAIN: ICD-10-CM

## 2018-10-15 DIAGNOSIS — D12.8 BENIGN NEOPLASM OF RECTUM: ICD-10-CM

## 2018-10-15 DIAGNOSIS — Z91.041 RADIOGRAPHIC DYE ALLERGY STATUS: ICD-10-CM

## 2018-10-15 DIAGNOSIS — K64.4 RESIDUAL HEMORRHOIDAL SKIN TAGS: ICD-10-CM

## 2018-10-15 DIAGNOSIS — D12.2 BENIGN NEOPLASM OF ASCENDING COLON: ICD-10-CM

## 2018-10-15 DIAGNOSIS — Z88.0 ALLERGY STATUS TO PENICILLIN: ICD-10-CM

## 2019-01-22 ENCOUNTER — OUTPATIENT (OUTPATIENT)
Dept: OUTPATIENT SERVICES | Facility: HOSPITAL | Age: 59
LOS: 1 days | Discharge: HOME | End: 2019-01-22

## 2019-01-22 DIAGNOSIS — Z12.31 ENCOUNTER FOR SCREENING MAMMOGRAM FOR MALIGNANT NEOPLASM OF BREAST: ICD-10-CM

## 2019-02-12 ENCOUNTER — EMERGENCY (EMERGENCY)
Facility: HOSPITAL | Age: 59
LOS: 0 days | Discharge: HOME | End: 2019-02-12
Attending: EMERGENCY MEDICINE | Admitting: EMERGENCY MEDICINE

## 2019-02-12 VITALS
TEMPERATURE: 98 F | HEART RATE: 85 BPM | RESPIRATION RATE: 20 BRPM | DIASTOLIC BLOOD PRESSURE: 75 MMHG | OXYGEN SATURATION: 99 % | SYSTOLIC BLOOD PRESSURE: 123 MMHG

## 2019-02-12 DIAGNOSIS — Z79.899 OTHER LONG TERM (CURRENT) DRUG THERAPY: ICD-10-CM
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
## 2019-02-12 DIAGNOSIS — S69.90XA UNSPECIFIED INJURY OF UNSPECIFIED WRIST, HAND AND FINGER(S), INITIAL ENCOUNTER: ICD-10-CM
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
## 2019-02-12 DIAGNOSIS — S60.031A CONTUSION OF RIGHT MIDDLE FINGER WITHOUT DAMAGE TO NAIL, INITIAL ENCOUNTER: ICD-10-CM

## 2019-02-12 DIAGNOSIS — J45.909 UNSPECIFIED ASTHMA, UNCOMPLICATED: ICD-10-CM

## 2019-02-12 RX ORDER — IBUPROFEN 200 MG
800 TABLET ORAL ONCE
Qty: 0 | Refills: 0 | Status: COMPLETED | OUTPATIENT
Start: 2019-02-12 | End: 2019-02-12

## 2019-02-12 RX ADMIN — Medication 800 MILLIGRAM(S): at 23:13

## 2019-02-12 NOTE — ED PROVIDER NOTE - CARE PROVIDER_API CALL
Jerome Beard)  Orthopaedic Surgery  [address] [place] [address]  Phone: [phone]  Fax: [phone]  Follow Up Time:

## 2019-02-12 NOTE — ED ADULT NURSE NOTE - NSIMPLEMENTINTERV_GEN_ALL_ED
Implemented All Universal Safety Interventions:  [place] to call system. Call bell, personal items and telephone within reach. Instruct patient to call for assistance. Room bathroom lighting operational. Non-slip footwear when patient is off stretcher. Physically safe environment: no spills, clutter or unnecessary equipment. Stretcher in lowest position, wheels locked, appropriate side rails in place.

## 2019-02-12 NOTE — ED PROVIDER NOTE - OBJECTIVE STATEMENT
58 year old female states today while in gym class a basketball hit her right middle finger and since has been in pain. denies other injury

## 2019-02-12 NOTE — ED PROVIDER NOTE - PHYSICAL EXAMINATION
Physical Exam    Vital Signs: I have reviewed the initial vital signs.  Constitutional: well-nourished, appears stated age, no acute distress  Musculoskeletal: supple neck, no lower extremity edema, no midline tenderness + right 3rd finger DIP tenderness and swelling and ecchymosis cap refill <2sec  Integumentary: warm, dry, no rash  Neurologic: awake, alert, extremities’ motor and sensory functions grossly intact  Psychiatric: appropriate mood, appropriate affect

## 2019-02-12 NOTE — ED PROVIDER NOTE - CLINICAL SUMMARY MEDICAL DECISION MAKING FREE TEXT BOX
58yF  p/w  finger  pain after jammed by baskeball this afternoon -  pt has OA of  DIP  with chronic  flexion -  xray no obviosu acute  fracture   - old degenerative joint- pslinted - follow up hand specialist 58yF  p/w  finger  pain after jammed by basketball this afternoon -  pt has OA of  DIP  with chronic  flexion -  xray no obviosu acute  fracture   - old degenerative joint- pslinted - follow up hand specialist

## 2019-03-08 ENCOUNTER — OUTPATIENT (OUTPATIENT)
Dept: OUTPATIENT SERVICES | Facility: HOSPITAL | Age: 59
LOS: 1 days | Discharge: HOME | End: 2019-03-08

## 2019-03-08 DIAGNOSIS — M54.16 RADICULOPATHY, LUMBAR REGION: ICD-10-CM

## 2019-04-18 ENCOUNTER — EMERGENCY (EMERGENCY)
Facility: HOSPITAL | Age: 59
LOS: 0 days | Discharge: HOME | End: 2019-04-18
Attending: EMERGENCY MEDICINE | Admitting: EMERGENCY MEDICINE
Payer: COMMERCIAL

## 2019-04-18 VITALS
SYSTOLIC BLOOD PRESSURE: 129 MMHG | OXYGEN SATURATION: 98 % | DIASTOLIC BLOOD PRESSURE: 59 MMHG | RESPIRATION RATE: 16 BRPM | TEMPERATURE: 98 F | HEART RATE: 64 BPM | HEIGHT: 64 IN | WEIGHT: 134.92 LBS

## 2019-04-18 DIAGNOSIS — Z87.19 PERSONAL HISTORY OF OTHER DISEASES OF THE DIGESTIVE SYSTEM: ICD-10-CM

## 2019-04-18 DIAGNOSIS — Z79.899 OTHER LONG TERM (CURRENT) DRUG THERAPY: ICD-10-CM

## 2019-04-18 DIAGNOSIS — Z91.018 ALLERGY TO OTHER FOODS: ICD-10-CM

## 2019-04-18 DIAGNOSIS — Z91.041 RADIOGRAPHIC DYE ALLERGY STATUS: ICD-10-CM
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
## 2019-04-18 DIAGNOSIS — S92.302A FRACTURE OF UNSPECIFIED METATARSAL BONE(S), LEFT FOOT, INITIAL ENCOUNTER FOR CLOSED FRACTURE: ICD-10-CM

## 2019-04-18 DIAGNOSIS — M79.673 PAIN IN UNSPECIFIED FOOT: ICD-10-CM
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
## 2019-04-18 DIAGNOSIS — J45.909 UNSPECIFIED ASTHMA, UNCOMPLICATED: ICD-10-CM
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
## 2019-04-18 DIAGNOSIS — Z79.1 LONG TERM (CURRENT) USE OF NON-STEROIDAL ANTI-INFLAMMATORIES (NSAID): ICD-10-CM

## 2019-04-18 DIAGNOSIS — Z88.0 ALLERGY STATUS TO PENICILLIN: ICD-10-CM
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
## 2019-04-18 PROCEDURE — 73630 X-RAY EXAM OF FOOT: CPT | Mod: 26,LT

## 2019-04-18 PROCEDURE — 99284 EMERGENCY DEPT VISIT MOD MDM: CPT

## 2019-04-18 RX ORDER — OMEPRAZOLE 10 MG/1
1 CAPSULE, DELAYED RELEASE ORAL
Qty: 0 | Refills: 0 | COMMUNITY

## 2019-04-18 RX ORDER — ACETAMINOPHEN 500 MG
650 TABLET ORAL ONCE
Qty: 0 | Refills: 0 | Status: COMPLETED | OUTPATIENT
Start: 2019-04-18 | End: 2019-04-18

## 2019-04-18 RX ADMIN — Medication 650 MILLIGRAM(S): at 15:27

## 2019-04-18 NOTE — ED PROVIDER NOTE - OBJECTIVE STATEMENT
59 y/o female with PMH of hyperthyroidism, arthritis who presents to ED for pain in the left foot. Pt was swinging a bat (works as a ) when she hit herself in the medial aspect of the left foot. Took Meloxicam and applied ice with decrease in pain. Now c/o pain prompting ED visit. Has been able to ambulate since. No numbness or tingling.

## 2019-04-18 NOTE — ED PROVIDER NOTE - ATTENDING CONTRIBUTION TO CARE
57 yo F presents with c/o left foot pain after accidentally hitting herself with a bat while cleaning out the shed.  Pt applied ice to foot, states she had fracture in the same foot in the past.  On exam pt in NAD AAO x 3, + tender  to base to left great toe, and medial aspect of dorsal mid foot, no abrasions, no bruising, ankle non tender, + DP pulses.

## 2019-04-18 NOTE — ED PROVIDER NOTE - CARE PROVIDER_API CALL
Jerome Beard)  Orthopaedic Surgery  [address] [place] [address]  Phone: [phone]  Fax: [phone]  Follow Up Time:

## 2019-04-18 NOTE — ED ADULT NURSE NOTE - NSIMPLEMENTINTERV_GEN_ALL_ED
Implemented All Universal Safety Interventions:  [place] to call system. Call bell, personal items and telephone within reach. Instruct patient to call for assistance. Room bathroom lighting operational. Non-slip footwear when patient is off stretcher. Physically safe environment: no spills, clutter or unnecessary equipment. Stretcher in lowest position, wheels locked, appropriate side rails in place.

## 2019-04-18 NOTE — ED PROCEDURE NOTE - CPROC ED POST PROC CARE GUIDE1
Instructed patient/caregiver regarding signs and symptoms of infection./Keep the cast/splint/dressing clean and dry./Instructed patient/caregiver to follow-up with primary care physician./Verbal/written post procedure instructions were given to patient/caregiver./Elevate the injured extremity as instructed.

## 2019-04-18 NOTE — ED PROVIDER NOTE - CLINICAL SUMMARY MEDICAL DECISION MAKING FREE TEXT BOX
x ray images reviewed with pt . Will place shoe, Rec ice, elevate and follow up with Ortho or Podiatry. Pt verbalizes understanding

## 2019-04-18 NOTE — ED PROVIDER NOTE - PHYSICAL EXAMINATION
CONSTITUTIONAL: Well-developed; well-nourished; in no acute distress. Non toxic appearing,   SKIN: warm, dry, no acute rash, abrasions, lacerations or hematomas.  BACK: no midline tenderness or step offs  EXT: Left foot: mild ttp over the medial aspect of the foot. no ecchymosis or erythema.  Distal neurovascular intact. no gross extremity injury  NEURO: gcs 15, moving all extremities grossly, following commands  PSYCH: Cooperative, appropriate

## 2019-07-05 NOTE — ED ADULT NURSE NOTE - AS HEIGHT TYPE
Patient with NARINDER [facility] stated

## 2019-10-07 NOTE — ED ADULT NURSE NOTE - CAS EDN DISCHARGE ASSESSMENT
Alert and oriented to person, place and time How Severe Is Your Skin Lesion?: moderate Have Your Skin Lesions Been Treated?: not been treated Is This A New Presentation, Or A Follow-Up?: Skin Lesions

## 2020-01-22 NOTE — ED ADULT NURSE NOTE - CAS EDP DISCH TYPE
I attempted to reach the pt. Earlier. She called to say she is in a nursing home in [place]. Hospitalized while visiting her daughter in Dec.. She will call back for an appt. When she comes home. Home

## 2020-02-05 ENCOUNTER — EMERGENCY (EMERGENCY)
Facility: HOSPITAL | Age: 60
LOS: 0 days | Discharge: HOME | End: 2020-02-05
Attending: EMERGENCY MEDICINE | Admitting: EMERGENCY MEDICINE
Payer: COMMERCIAL

## 2020-02-05 VITALS
OXYGEN SATURATION: 98 % | HEIGHT: 65 IN | WEIGHT: 138.01 LBS | RESPIRATION RATE: 18 BRPM | SYSTOLIC BLOOD PRESSURE: 131 MMHG | DIASTOLIC BLOOD PRESSURE: 72 MMHG | HEART RATE: 92 BPM | TEMPERATURE: 98 F

## 2020-02-05 DIAGNOSIS — R06.2 WHEEZING: ICD-10-CM

## 2020-02-05 DIAGNOSIS — Z91.018 ALLERGY TO OTHER FOODS: ICD-10-CM

## 2020-02-05 DIAGNOSIS — Z91.041 RADIOGRAPHIC DYE ALLERGY STATUS: ICD-10-CM

## 2020-02-05 DIAGNOSIS — Z88.0 ALLERGY STATUS TO PENICILLIN: ICD-10-CM

## 2020-02-05 PROCEDURE — 99285 EMERGENCY DEPT VISIT HI MDM: CPT

## 2020-02-05 RX ORDER — IPRATROPIUM/ALBUTEROL SULFATE 18-103MCG
3 AEROSOL WITH ADAPTER (GRAM) INHALATION
Refills: 0 | Status: COMPLETED | OUTPATIENT
Start: 2020-02-05 | End: 2020-02-05

## 2020-02-05 RX ADMIN — Medication 3 MILLILITER(S): at 11:27

## 2020-02-05 RX ADMIN — Medication 3 MILLILITER(S): at 11:40

## 2020-02-05 RX ADMIN — Medication 3 MILLILITER(S): at 11:28

## 2020-02-05 NOTE — ED PROVIDER NOTE - PROGRESS NOTE DETAILS
ATTENDING NOTE: 58 y/o F PMH asthma p/w tight feeling in throat x3-4 days. Today Pt felt like her throat was going to close so decided to come to ED. Pt states that she believes this is related to her asthma and notes she has not taken her Advair regularly for aprox. 1 year. Pt also has nasal congestion over the past 2-3 days. No fevers, chills, n/v/d, cough, CP or tightness.  PE: Well appearing, awake and alert. Cardio – S1S2. HEENT: (+) Dry OP, Resp - CTAB. Abdomen – soft, NTND. Ext- no calf swelling. Neuro – grossly unremarkable. A/P: Likely post nasal drip, will give fluids reassess. Discussed results with pt.  All questions were answered and return precautions discussed.  Pt is asx and comfortable at this time.  Unremarkable re-exam.  No further concerns at this time from pt.  Will follow up with PMD.  Pt understands and agrees with tx plan. ATTENDING NOTE: 60 y/o F PMH asthma p/w tight feeling in throat x3-4 days. Today Pt felt like her throat was going to close so decided to come to ED. Pt states that she believes this is related to her asthma and notes she has not taken her Advair regularly for aprox. 1 year. Pt also has nasal congestion over the past 2-3 days. No fevers, chills, n/v/d, cough, CP or tightness.  PE: Well appearing, awake and alert. Cardio – S1S2. HEENT: (+) Dry OP, Resp - CTAB. Abdomen – soft, NTND. Ext- no calf swelling. Neuro – grossly unremarkable.

## 2020-02-05 NOTE — ED PROVIDER NOTE - OBJECTIVE STATEMENT
59 y.o female w/ hx of MRSA, hiatal hernia, hyperthyroid, asthma, diverticulitis presents to the ED for evaluation of wheezing.  States her asthma has been acting up past few days and has only been using advair qd instead of BID.  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  After using home meds today feels somewhat improved.  Denies chest pain, dyspnea, edema of lower extremities, calf pain, hemoptysis, fever, chills, rashes, tongue/lip swelling, fever, chills, N/V, abd pain.  No further complaints.

## 2020-02-05 NOTE — ED PROVIDER NOTE - NS ED ROS FT
Constitutional: See HPI.  Eyes: No visual changes, eye pain or discharge.   ENMT: No hearing changes, pain, discharge or infections  Cardiac: No SOB or edema. No chest pain with exertion.  Respiratory: + wheezing. No cough or respiratory distress  GI: No nausea, vomiting, diarrhea or abdominal pain.  : No dysuria, frequency or burning. No Discharge  MS: No myalgia, muscle weakness, joint pain or back pain.  Neuro: No headache or weakness.   Skin: No skin rash.  Except as documented in the HPI, all other systems are negative.

## 2020-02-05 NOTE — ED PROVIDER NOTE - PHYSICAL EXAMINATION
CONST: Well appearing in NAD  EYES: Sclera and conjunctiva clear.  ENT: no erythema posterior pharynx, no tongue or lip swelling.   CARD: Normal S1 S2; Normal rate and rhythm  RESP: Equal BS B/L, No wheezes, rhonchi or rales. No distress  GI: Soft, non-tender, non-distended.  MS: Normal ROM in all extremities. No edema of lower extremities, no calf pain, radial pulses 2+ bilaterally  SKIN: Warm, dry, no acute rashes. Good turgor  NEURO: A&Ox3, No focal deficits. Strength 5/5 with no sensory deficits. Steady gait

## 2020-02-05 NOTE — ED PROVIDER NOTE - CARE PROVIDER_API CALL
Jay Rojas)  Critical Care Medicine; Geriatric Medicine; Internal Medicine; Pulmonary Disease  [address] [place] [address]  Phone: [phone]  Fax: [phone]  Follow Up Time: 1-3 Days

## 2020-02-05 NOTE — ED PROVIDER NOTE - PATIENT PORTAL LINK FT
You can access the FollowMyHealth Patient Portal offered by [facility] by registering at the following website: [email or web address]. By joining [facility]’s FollowMyHealth portal, you will also be able to view your health information using other applications (apps) compatible with our system.

## 2020-02-05 NOTE — ED PROVIDER NOTE - NSFOLLOWUPINSTRUCTIONS_ED_ALL_ED_FT
Asthma    Asthma is a condition in which the airways tighten and narrow, making it difficult to breath. Asthma episodes, also called asthma attacks, range from minor to life-threatening. Symptoms include wheezing, coughing, chest tightness, or shortness of breath. The diagnosis of asthma is made by a review of your medical history and a physical exam, but may involve additional testing. Asthma cannot be cured, but medicines and lifestyle changes can help control it. Avoid triggers of asthma which may include animal dander, pollen, mold, smoke, air pollutants, etc.     SEEK IMMEDIATE MEDICAL CARE IF YOU HAVE ANY OF THE FOLLOWING SYMPTOMS: worsening of symptoms, shortness of breath at rest, chest pain, bluish discoloration to lips or fingertips, lightheadedness/dizziness, or fever.    Follow up with your primary medical doctor in 1-2 days

## 2020-06-11 PROBLEM — Z00.00 ENCOUNTER FOR PREVENTIVE HEALTH EXAMINATION: Status: ACTIVE | Noted: 2020-06-11

## 2020-07-14 ENCOUNTER — APPOINTMENT (OUTPATIENT)
Dept: GASTROENTEROLOGY | Facility: CLINIC | Age: 60
End: 2020-07-14
Payer: COMMERCIAL

## 2020-07-14 DIAGNOSIS — Z78.9 OTHER SPECIFIED HEALTH STATUS: ICD-10-CM

## 2020-07-14 DIAGNOSIS — Z80.0 FAMILY HISTORY OF MALIGNANT NEOPLASM OF DIGESTIVE ORGANS: ICD-10-CM

## 2020-07-14 DIAGNOSIS — R19.4 CHANGE IN BOWEL HABIT: ICD-10-CM

## 2020-07-14 DIAGNOSIS — R19.7 DIARRHEA, UNSPECIFIED: ICD-10-CM

## 2020-07-14 DIAGNOSIS — Z86.39 PERSONAL HISTORY OF OTHER ENDOCRINE, NUTRITIONAL AND METABOLIC DISEASE: ICD-10-CM

## 2020-07-14 PROCEDURE — 99204 OFFICE O/P NEW MOD 45 MIN: CPT | Mod: 95

## 2020-07-14 RX ORDER — METHIMAZOLE 5 MG/1
5 TABLET ORAL
Refills: 0 | Status: ACTIVE | COMMUNITY

## 2020-07-14 NOTE — ASSESSMENT
[FreeTextEntry1] : 59 year old female patient average risk for CRC, personal hx of colon TAs, presents with new onset crampy lower abdominal pain, excessive bloating and change in bowel habits, no blood in the stools but reports intermittent diarrhea instead of being usually constipated. No weight loss or night symptoms. \par No relationship with food intake. \par No UGI complaints, occasional reflux. \par Last colono in 2017 with TAs, and fair prep, advised to repeat in 3 years. \par As well she reports new onset dysphagia, occ GERD, in the setting of fam hx of esophageal cancer\par \par Needs EGD, colonoscopy\par Risks and benefits discussed with patient.\par

## 2020-07-14 NOTE — PHYSICAL EXAM
[General Appearance - Alert] : alert [Sclera] : the sclera and conjunctiva were normal [Hearing Threshold Finger Rub Not [place]] : hearing was normal [Skin Color & Pigmentation] : normal skin color and pigmentation [] : no respiratory distress [Oriented To Time, Place, And Person] : oriented to person, place, and time

## 2020-07-14 NOTE — HISTORY OF PRESENT ILLNESS
[Home] : at home, [unfilled] , at the time of the visit. [Medical Office: ([facility])___] : at the medical office located in  [Verbal consent obtained from patient] : the patient, [unfilled] [FreeTextEntry4] : Sylvia Rutherford [de-identified] : 59 year old female patient average risk for CRC, personal hx of colon TAs, presents with new onset crampy lower abdominal pain, excessive bloating and change in bowel habits, no blood in the stools but reports intermittent diarrhea instead of being usually constipated. No weight loss or night symptoms. \par No relationship with food intake. \par No UGI complaints, occasional reflux. \par Last colono in 2017 with TAs, and fair prep, advised to repeat in 3 years. \par As well she reports new onset dysphagia, occ GERD, in the setting of fam hx of esophageal cancer

## 2020-07-14 NOTE — REASON FOR VISIT
[FreeTextEntry1] : Worsening constipation/ change in bowel habits and crampy pain, dysphagia of 2 weeks duration

## 2020-07-28 ENCOUNTER — LABORATORY RESULT (OUTPATIENT)
Age: 60
End: 2020-07-28

## 2020-07-28 ENCOUNTER — OUTPATIENT (OUTPATIENT)
Dept: OUTPATIENT SERVICES | Facility: HOSPITAL | Age: 60
LOS: 1 days | Discharge: HOME | End: 2020-07-28

## 2020-07-28 DIAGNOSIS — Z11.59 ENCOUNTER FOR SCREENING FOR OTHER VIRAL DISEASES: ICD-10-CM

## 2020-07-30 NOTE — ASU PATIENT PROFILE, ADULT - PMH
Asthma    Diverticulitis    Hiatal hernia    Hyperthyroidism    MRSA infection  right lung  Sleep apnea  mild with dental device, no cpap machine

## 2020-07-31 ENCOUNTER — OUTPATIENT (OUTPATIENT)
Dept: OUTPATIENT SERVICES | Facility: HOSPITAL | Age: 60
LOS: 1 days | Discharge: HOME | End: 2020-07-31
Payer: COMMERCIAL

## 2020-07-31 ENCOUNTER — RESULT REVIEW (OUTPATIENT)
Age: 60
End: 2020-07-31

## 2020-07-31 ENCOUNTER — TRANSCRIPTION ENCOUNTER (OUTPATIENT)
Age: 60
End: 2020-07-31

## 2020-07-31 VITALS
HEART RATE: 65 BPM | HEIGHT: 65 IN | RESPIRATION RATE: 16 BRPM | DIASTOLIC BLOOD PRESSURE: 65 MMHG | TEMPERATURE: 98 F | OXYGEN SATURATION: 99 % | SYSTOLIC BLOOD PRESSURE: 134 MMHG | WEIGHT: 134.92 LBS

## 2020-07-31 VITALS — RESPIRATION RATE: 17 BRPM | HEART RATE: 59 BPM | SYSTOLIC BLOOD PRESSURE: 112 MMHG | DIASTOLIC BLOOD PRESSURE: 62 MMHG

## 2020-07-31 DIAGNOSIS — Z98.890 OTHER SPECIFIED POSTPROCEDURAL STATES: Chronic | ICD-10-CM

## 2020-07-31 PROCEDURE — 88305 TISSUE EXAM BY PATHOLOGIST: CPT | Mod: 26

## 2020-07-31 PROCEDURE — 45380 COLONOSCOPY AND BIOPSY: CPT

## 2020-07-31 PROCEDURE — 43239 EGD BIOPSY SINGLE/MULTIPLE: CPT | Mod: XS

## 2020-07-31 PROCEDURE — 88312 SPECIAL STAINS GROUP 1: CPT | Mod: 26

## 2020-07-31 RX ORDER — MELOXICAM 15 MG/1
0 TABLET ORAL
Qty: 0 | Refills: 0 | DISCHARGE

## 2020-07-31 NOTE — H&P PST ADULT - HISTORY OF PRESENT ILLNESS
58 yo female average risk CRC personal history colon TAs presents new onset crampy lower abdominal pain, Last colon 2017 with TAs and fair prep advised to repeat 3 years. EGD for new onset dysphagia, GERD family history of esophageal cancer. 58 yo female average risk CRC personal history colon TAs presents new onset crampy lower abdominal pain, Last colon 2017 with TAs and fair prep advised to repeat 3 years. EGD for new onset dysphagia, GERD family history of esophageal cancer. Due for Colonoscopy as well

## 2020-07-31 NOTE — ASU DISCHARGE PLAN (ADULT/PEDIATRIC) - CALL YOUR DOCTOR IF YOU HAVE ANY OF THE FOLLOWING:
Pain not relieved by Medications/Bleeding that does not stop/Inability to tolerate liquids or foods/Excessive diarrhea/Fever greater than (need to indicate Fahrenheit or Celsius)/Nausea and vomiting that does not stop

## 2020-07-31 NOTE — ASU DISCHARGE PLAN (ADULT/PEDIATRIC) - CARE PROVIDER_API CALL
Archana William  INTERNAL MEDICINE  [address] [place] [address]  Phone: [phone]  Fax: [phone]  Follow Up Time:

## 2020-07-31 NOTE — PRE-ANESTHESIA EVALUATION ADULT - NSANTHOSAYNRD_GEN_A_CORE
No. ROSAURA screening performed.  STOP BANG Legend: 0-2 = LOW Risk; 3-4 = INTERMEDIATE Risk; 5-8 = HIGH Risk

## 2020-08-04 LAB
SURGICAL PATHOLOGY STUDY: SIGNIFICANT CHANGE UP
SURGICAL PATHOLOGY STUDY: SIGNIFICANT CHANGE UP

## 2020-08-05 DIAGNOSIS — Z88.0 ALLERGY STATUS TO PENICILLIN: ICD-10-CM

## 2020-08-05 DIAGNOSIS — G57.33 LESION OF LATERAL POPLITEAL NERVE, BILATERAL LOWER LIMBS: ICD-10-CM

## 2020-08-05 DIAGNOSIS — Z91.041 RADIOGRAPHIC DYE ALLERGY STATUS: ICD-10-CM

## 2020-08-05 DIAGNOSIS — K62.1 RECTAL POLYP: ICD-10-CM

## 2020-08-05 DIAGNOSIS — K31.7 POLYP OF STOMACH AND DUODENUM: ICD-10-CM

## 2020-08-05 DIAGNOSIS — K20.9 ESOPHAGITIS, UNSPECIFIED: ICD-10-CM

## 2020-08-05 DIAGNOSIS — E05.90 THYROTOXICOSIS, UNSPECIFIED WITHOUT THYROTOXIC CRISIS OR STORM: ICD-10-CM

## 2020-08-05 DIAGNOSIS — D12.4 BENIGN NEOPLASM OF DESCENDING COLON: ICD-10-CM

## 2020-08-05 DIAGNOSIS — Z12.11 ENCOUNTER FOR SCREENING FOR MALIGNANT NEOPLASM OF COLON: ICD-10-CM

## 2020-08-05 DIAGNOSIS — J45.909 UNSPECIFIED ASTHMA, UNCOMPLICATED: ICD-10-CM

## 2020-08-05 DIAGNOSIS — K64.4 RESIDUAL HEMORRHOIDAL SKIN TAGS: ICD-10-CM

## 2020-08-05 DIAGNOSIS — D12.2 BENIGN NEOPLASM OF ASCENDING COLON: ICD-10-CM

## 2020-08-05 DIAGNOSIS — K29.50 UNSPECIFIED CHRONIC GASTRITIS WITHOUT BLEEDING: ICD-10-CM

## 2020-08-12 PROBLEM — G47.30 SLEEP APNEA, UNSPECIFIED: Chronic | Status: ACTIVE | Noted: 2020-07-31

## 2020-08-25 NOTE — ED PROVIDER NOTE - CONDITION AT DISCHARGE:
Salvatore is a 50 year old male who is status post removal of a lipoma on the neck.  with no  chills and no fever.      Patient's  Pain is  improving.  Appetite is  improving.    Wound(s)  Is/are   clean dry and intact with no evidence of infection.  But does have some fluid  In the upper aspect above his wound and some swelling in the area, but really no pain and no erythema.         Path report shows:    Final Diagnosis    Right upper back, excision-Fibrolipoma   Electronically signed by Lois Quintana MD on 8/20/2020 at 1012    Clinical Information     large growing lipoma right upper back and neck   Gross Description     Right upper back: Is a 9.8 x 9.5 x 3.4 cm aggregate of irregular fatty soft tissue fragments which are serially sectioned revealing a fatty cut surface without areas of hemorrhage or necrosis. On the outer surface there is focal adherent muscle. Representative sections are submitted in cassette A1 through A5.   (CM)         Plan:remove sutures next week.   Use antibiotic ointment on wound at night.   Will start on bactrim ds bid      Time spent with the patient with greater that 50% of the time in discussion was 15 minutes.  Frank Fuentes MD, MD    Physician    General Surgery    OR Surgeon    Signed    Date of Service:  8/17/2020  8:17 AM             Procedure: RELEASE: TRIGGER FINGER RIGHT INDEX FINGER  Case Time: 8/17/2020  8:17 AM  Surgeon: Sabino Lipscomb MD                   []Hide copied text    []Hover for details     POST-OPERATIVE NOTE       Surgeon(s):  Frank Vega MD Rogers, John Brittan, MD     Assistant(s):  Circulating nurse: Kavita Chacon RN; Ry Lira RN  Scrub tech: Susie Silva; Mimi Santana; Dianne Watters     Preoperative Diagnosis:  large growing lipoma right upper back and neck, trigger finger aquired right index finger     Postoperative Diagnosis:  12 by 8 by 3 cm thick hard lipoma  Attached to the  subcutaneous skin and to the fascia.      Procedure(s):  Excsion of 12 by 8 by 3 cm thick hard lipoma  Attached to the subcutaneous skin and to the fascia. Excsion and undermining of the skin and multiple layer closure.      Complications:  None      EBL: 60 mL     Anesthesia:  General     Operative Findings:  As above     Specimen(s):    ID Type Source Tests Collected by Time Destination   1 : right upper back lipoma.  Tissue Lipoma (Specify Site) SURGICAL PATHOLOGY Sabino Lipscomb MD 8/17/2020 0922           Grafts and/or Implants:None     Condition on Discharge from Operating Room: Satisfactory        REPORT OF OPERATION/ PROCEDURE  Please see below operative note.     Risks explained including bleeding, infection, scar and recurrence.  And damage to nerves.   Dr. Lipscomb did his surgery first with the patient in the prone position under General anesthesia .   Then continuing with General anesthesia  The area was preped and draped ni steril manner.   After sterile prep with betadine the area was infiltrated with local.  An incision was made over the marked out lump.  I started with a small incison and eventually need e to make it cllarger to get this large scarred down lipoma out. I started with taking donw the superfical area staying close to the subcutaneous fat.  Then I divided the fat longitudinally to get down to the fascia.   I did get into the muscle layer lterally. There was no easy plane so I just removed a quarter of the lipoma at a time moslty with heavy scissors.   The most difficult part to get out was the more superior medial quarter as it seemed to be attached to the fascia more. And there was an area that seemed harder then the rest. Once I had removed the lipoma I looked to see if there was any left and felt that I had removed it all.   Hemoastais was done with pressure and some cautery. I did try to use cautery on the lipoma but it was to thick.  After the removal I irrigated several times  and did not see any more bleeding.  I then closed the skin in multiple layers with 0 vicryl.   Then a running suture with 3-0 monocryl.  The undermining of the skin was done with the removal of the lipoma.   The wound was reinforced with 0 nylon interrupted vertical mattress suture time 5, along with a pressure dressing.            Procedure  Preop dx:  12 by 8 by 3 cm thick hard lipoma  Attached to the subcutaneous skin and to the fascia.   Post op dx:  Same  Procedure:  8 cm  Incison for removal of 12 by 8 by 3 cm thick hard lipoma  Attached to the subcutaneous skin and to the fascia.  Undermining with the skin was done and then multiple layer closure.      After sterile prep with betadine the area was infiltrated with local.  An incision was made over the marked out lump.  I started with a small incison and eventually need e to make it cllarger to get this large scarred down lipoma out. I started with taking donw the superfical area staying close to the subcutaneous fat.  Then I divided the fat longitudinally to get down to the fascia.   I did get into the muscle layer lterally. There was no easy plane so I just removed a quarter of the lipoma at a time moslty with heavy scissors.   The most difficult part to get out was the more superior medial quarter as it seemed to be attached to the fascia more. And there was an area that seemed harder then the rest. Once I had removed the lipoma I looked to see if there was any left and felt that I had removed it all.   Hemoastais was done with pressure and some cautery. I did try to use cautery on the lipoma but it was to thick.  After the removal I irrigated several times and did not see any more bleeding.  I then closed the skin in multiple layers with 0 vicryl.   Then a running suture with 3-0 monocryl.  The undermining of the skin was done with the removal of the lipoma.   The wound was reinforced with 0 nylon interrupted vertical mattress suture time 5, along with a  pressure dressing.   Anesthesia:   0.25 % marcaine with epinephrine   Est. blood loss: 60  cc  Patient tolerated procedure well.  Hemostasis obtained with pressure.  Follow up with me in 2 weeks. For suture removal and path report                  On the midline more to the right side is a about 8 by 8 cm slightly mobile mass that is probably a lipoma or brown fat and is probably deep to the fascia.   Assessment: On the midline more to the right side is a about 8 by 8 cm slightly mobile mass that is probably a lipoma or brown fat and is probably deep to the fascia.    Plan to do patient has a trigger finger that is bothering him more so he will do this first.  Then will do the lump removal in the OR with  General anesthesia  And him in the prone position.          Satisfactory

## 2020-09-03 ENCOUNTER — APPOINTMENT (OUTPATIENT)
Dept: GASTROENTEROLOGY | Facility: CLINIC | Age: 60
End: 2020-09-03
Payer: COMMERCIAL

## 2020-09-03 ENCOUNTER — TRANSCRIPTION ENCOUNTER (OUTPATIENT)
Age: 60
End: 2020-09-03

## 2020-09-03 PROCEDURE — 99214 OFFICE O/P EST MOD 30 MIN: CPT | Mod: 95

## 2020-09-03 RX ORDER — POLYETHYLENE GLYCOL 3350 AND ELECTROLYTES WITH LEMON FLAVOR 236; 22.74; 6.74; 5.86; 2.97 G/4L; G/4L; G/4L; G/4L; G/4L
236 POWDER, FOR SOLUTION ORAL
Qty: 1 | Refills: 0 | Status: DISCONTINUED | COMMUNITY
Start: 2020-07-14 | End: 2020-09-03

## 2020-09-03 NOTE — PHYSICAL EXAM
[General Appearance - Alert] : alert [] : no respiratory distress [Hearing Threshold Finger Rub Not [place]] : hearing was normal [Oriented To Time, Place, And Person] : oriented to person, place, and time

## 2020-09-03 NOTE — ASSESSMENT
[FreeTextEntry1] : 59 year old female patient average risk for CRC, personal hx of colon TAs, presents with new onset crampy lower abdominal pain, excessive bloating and change in bowel habits, no blood in the stools but reports intermittent diarrhea instead of being usually constipated. No weight loss or night symptoms. \par No relationship with food intake. \par No UGI complaints, occasional reflux. \par Last colono in 2017 with TAs, and fair prep, advised to repeat in 3 years. \par As well she reporte new onset dysphagia, occ GERD, in the setting of fam hx of esophageal cancer\par s/p EGD, colonoscopy with TAs, and minimal esophagitis\par Now doing well\par \par Rec: pepcid AC PRN\par Polyp surveillance colonoscopy in 5 years\par RTC PRN

## 2020-09-03 NOTE — HISTORY OF PRESENT ILLNESS
[Home] : at home, [unfilled] , at the time of the visit. [Medical Office: ([facility])___] : at the medical office located in  [Verbal consent obtained from patient] : the patient, [unfilled] [FreeTextEntry4] : Sylvia Rutherford [de-identified] : 59 year old female patient average risk for CRC, personal hx of colon TAs, presents with new onset crampy lower abdominal pain, excessive bloating and change in bowel habits, no blood in the stools but reports intermittent diarrhea instead of being usually constipated. No weight loss or night symptoms. \par No relationship with food intake. \par No UGI complaints, occasional reflux. \par Last colono in 2017 with TAs, and fair prep, advised to repeat in 3 years. \par As well she reported new onset dysphagia, occ GERD, in the setting of fam hx of esophageal cancer\par s/p EGD, colonoscopy with TAs, and minimal esophagitis

## 2020-09-22 ENCOUNTER — TRANSCRIPTION ENCOUNTER (OUTPATIENT)
Age: 60
End: 2020-09-22

## 2021-02-12 ENCOUNTER — OUTPATIENT (OUTPATIENT)
Dept: OUTPATIENT SERVICES | Facility: HOSPITAL | Age: 61
LOS: 1 days | Discharge: HOME | End: 2021-02-12
Payer: COMMERCIAL

## 2021-02-12 DIAGNOSIS — Z98.890 OTHER SPECIFIED POSTPROCEDURAL STATES: Chronic | ICD-10-CM

## 2021-02-12 DIAGNOSIS — Z12.31 ENCOUNTER FOR SCREENING MAMMOGRAM FOR MALIGNANT NEOPLASM OF BREAST: ICD-10-CM

## 2021-02-12 PROCEDURE — 77063 BREAST TOMOSYNTHESIS BI: CPT | Mod: 26

## 2021-02-12 PROCEDURE — 77067 SCR MAMMO BI INCL CAD: CPT | Mod: 26

## 2021-02-17 NOTE — ED ADULT NURSE NOTE - ED STAT RN HAND OFF
Handoff Cimetidine Counseling:  I discussed with the patient the risks of Cimetidine including but not limited to gynecomastia, headache, diarrhea, nausea, drowsiness, arrhythmias, pancreatitis, skin rashes, psychosis, bone marrow suppression and kidney toxicity.

## 2021-02-22 ENCOUNTER — OUTPATIENT (OUTPATIENT)
Dept: OUTPATIENT SERVICES | Facility: HOSPITAL | Age: 61
LOS: 1 days | Discharge: HOME | End: 2021-02-22
Payer: MEDICARE

## 2021-02-22 DIAGNOSIS — Z98.890 OTHER SPECIFIED POSTPROCEDURAL STATES: Chronic | ICD-10-CM

## 2021-02-22 DIAGNOSIS — R92.8 OTHER ABNORMAL AND INCONCLUSIVE FINDINGS ON DIAGNOSTIC IMAGING OF BREAST: ICD-10-CM

## 2021-02-22 PROCEDURE — 77066 DX MAMMO INCL CAD BI: CPT | Mod: 26

## 2021-02-22 PROCEDURE — G0279: CPT | Mod: 26

## 2021-02-22 PROCEDURE — 76641 ULTRASOUND BREAST COMPLETE: CPT | Mod: 26,50

## 2021-05-19 ENCOUNTER — TRANSCRIPTION ENCOUNTER (OUTPATIENT)
Age: 61
End: 2021-05-19

## 2021-05-21 ENCOUNTER — OUTPATIENT (OUTPATIENT)
Dept: OUTPATIENT SERVICES | Facility: HOSPITAL | Age: 61
LOS: 1 days | Discharge: HOME | End: 2021-05-21

## 2021-05-21 DIAGNOSIS — Z98.890 OTHER SPECIFIED POSTPROCEDURAL STATES: Chronic | ICD-10-CM

## 2021-05-21 DIAGNOSIS — Z11.59 ENCOUNTER FOR SCREENING FOR OTHER VIRAL DISEASES: ICD-10-CM

## 2021-05-24 ENCOUNTER — APPOINTMENT (OUTPATIENT)
Age: 61
End: 2021-05-24
Payer: COMMERCIAL

## 2021-05-24 ENCOUNTER — APPOINTMENT (OUTPATIENT)
Dept: PULMONOLOGY | Facility: CLINIC | Age: 61
End: 2021-05-24
Payer: COMMERCIAL

## 2021-05-24 VITALS
BODY MASS INDEX: 24.59 KG/M2 | RESPIRATION RATE: 14 BRPM | WEIGHT: 144 LBS | HEART RATE: 81 BPM | HEIGHT: 64 IN | OXYGEN SATURATION: 98 % | DIASTOLIC BLOOD PRESSURE: 62 MMHG | SYSTOLIC BLOOD PRESSURE: 118 MMHG

## 2021-05-24 PROCEDURE — 99213 OFFICE O/P EST LOW 20 MIN: CPT | Mod: 25

## 2021-05-24 PROCEDURE — 94729 DIFFUSING CAPACITY: CPT

## 2021-05-24 PROCEDURE — 94727 GAS DIL/WSHOT DETER LNG VOL: CPT

## 2021-05-24 PROCEDURE — 94010 BREATHING CAPACITY TEST: CPT

## 2021-05-24 NOTE — HISTORY OF PRESENT ILLNESS
[Mild Persistent] : mild persistent [Doing Well] : doing well [Wheezing] : denies wheezing [Cough] : stable coughing [Shortness Of Breath] : denies shortness of breath [Chest Tightness Or Heavy Pressure] : denies chest tightness [Feelings Of Weakness On Exertion] : denies reduced exercise tolerance [Cough at Night] : awakened at night because of cough [Wheezing at Night] : not awakened at night because of wheezing or trouble breathing [Cold] : cold weather [Pollen] : pollen exposure [Adherent] : the patient is adherent with ~his/her~ medication regimen [Goals--Doing Well] : the patient is doing well with ~his/her~ asthma goals

## 2021-08-24 ENCOUNTER — OUTPATIENT (OUTPATIENT)
Dept: OUTPATIENT SERVICES | Facility: HOSPITAL | Age: 61
LOS: 1 days | Discharge: HOME | End: 2021-08-24
Payer: MEDICARE

## 2021-08-24 DIAGNOSIS — Z98.890 OTHER SPECIFIED POSTPROCEDURAL STATES: Chronic | ICD-10-CM

## 2021-08-24 DIAGNOSIS — R92.8 OTHER ABNORMAL AND INCONCLUSIVE FINDINGS ON DIAGNOSTIC IMAGING OF BREAST: ICD-10-CM

## 2021-08-24 PROCEDURE — G0279: CPT | Mod: 26

## 2021-08-24 PROCEDURE — 77066 DX MAMMO INCL CAD BI: CPT | Mod: 26

## 2021-11-04 NOTE — ASU PREOP CHECKLIST - WEIGHT IN KG
Cardiologist at [email or web address]  We post information about upcoming events, links to health articles, updates about Down syndrome research and education, and details about opportunities in the community.        61.2

## 2021-11-08 ENCOUNTER — APPOINTMENT (OUTPATIENT)
Age: 61
End: 2021-11-08
Payer: COMMERCIAL

## 2021-11-08 VITALS
HEIGHT: 64 IN | WEIGHT: 142 LBS | OXYGEN SATURATION: 98 % | HEART RATE: 85 BPM | DIASTOLIC BLOOD PRESSURE: 78 MMHG | BODY MASS INDEX: 24.24 KG/M2 | RESPIRATION RATE: 14 BRPM | SYSTOLIC BLOOD PRESSURE: 120 MMHG

## 2021-11-08 PROCEDURE — 99213 OFFICE O/P EST LOW 20 MIN: CPT

## 2021-11-08 NOTE — HISTORY OF PRESENT ILLNESS
[Wheezing] : denies wheezing [Cough] : denies coughing [Shortness Of Breath] : denies shortness of breath [Chest Tightness Or Heavy Pressure] : denies chest tightness [Feelings Of Weakness On Exertion] : denies reduced exercise tolerance [Cough at Night] : not awakened at night with cough [Wheezing at Night] : not awakened at night because of wheezing or trouble breathing [More Frequent Use Needed Recently] : normal [Adherent] : the patient is adherent with ~his/her~ medication regimen [Goals--Doing Well] : the patient is doing well with ~his/her~ asthma goals

## 2021-11-12 RX ORDER — FLUTICASONE PROPIONATE AND SALMETEROL 250; 50 UG/1; UG/1
250-50 POWDER RESPIRATORY (INHALATION)
Qty: 1 | Refills: 3 | Status: DISCONTINUED | COMMUNITY
Start: 2021-11-08 | End: 2021-11-12

## 2021-11-12 RX ORDER — AZELASTINE HYDROCHLORIDE 205.5 UG/1
0.15 SPRAY, METERED NASAL
Qty: 1 | Refills: 2 | Status: DISCONTINUED | COMMUNITY
Start: 2021-05-24 | End: 2021-11-12

## 2021-11-12 RX ORDER — FLUTICASONE PROPIONATE AND SALMETEROL 250; 50 UG/1; UG/1
250-50 POWDER RESPIRATORY (INHALATION)
Qty: 1 | Refills: 5 | Status: DISCONTINUED | COMMUNITY
End: 2021-11-12

## 2021-11-12 RX ORDER — DICYCLOMINE HYDROCHLORIDE 10 MG/1
10 CAPSULE ORAL 3 TIMES DAILY
Qty: 90 | Refills: 3 | Status: DISCONTINUED | COMMUNITY
Start: 2020-08-04 | End: 2021-11-12

## 2021-11-16 ENCOUNTER — APPOINTMENT (OUTPATIENT)
Dept: GASTROENTEROLOGY | Facility: CLINIC | Age: 61
End: 2021-11-16
Payer: COMMERCIAL

## 2021-11-16 DIAGNOSIS — R13.19 OTHER DYSPHAGIA: ICD-10-CM

## 2021-11-16 PROCEDURE — 99213 OFFICE O/P EST LOW 20 MIN: CPT | Mod: 95

## 2021-11-16 NOTE — PHYSICAL EXAM
[General Appearance - Alert] : alert [Hearing Threshold Finger Rub Not [place]] : hearing was normal [] : no respiratory distress [Skin Color & Pigmentation] : normal skin color and pigmentation [Oriented To Time, Place, And Person] : oriented to person, place, and time

## 2021-11-16 NOTE — ASSESSMENT
[FreeTextEntry1] : 59 year old female patient average risk for CRC, fam hx of esophageal cancer, personal hx of colon TAs, hx of IBS and bloating, last colonoscopy in 2020 had 1 TA removed, and EGD in 2020 with grade 1 esophagitis and non HP gastritis.\par Now she presents with heartburn, occasional dysphagia, and morning nausea. \par  No weight loss, blood in the stools or night symptoms. \par  \par Chronic GERD\par EGD with esophagitis\par gaining weight \par Rec: pantoprazole/ famotidine\par taper after 3 months\par Low fat and low caloric diet along with lifestyle modifications discussed with patient.\par \par \par Colon polyps\par Polyp surveillance colonoscopy in 5 years\par \par RTC PRN

## 2022-03-14 ENCOUNTER — OUTPATIENT (OUTPATIENT)
Dept: OUTPATIENT SERVICES | Facility: HOSPITAL | Age: 62
LOS: 1 days | Discharge: HOME | End: 2022-03-14
Payer: MEDICARE

## 2022-03-14 DIAGNOSIS — Z98.890 OTHER SPECIFIED POSTPROCEDURAL STATES: Chronic | ICD-10-CM

## 2022-03-14 DIAGNOSIS — R92.8 OTHER ABNORMAL AND INCONCLUSIVE FINDINGS ON DIAGNOSTIC IMAGING OF BREAST: ICD-10-CM

## 2022-03-14 PROCEDURE — G0279: CPT | Mod: 26

## 2022-03-14 PROCEDURE — 77066 DX MAMMO INCL CAD BI: CPT | Mod: 26

## 2022-03-14 PROCEDURE — 76641 ULTRASOUND BREAST COMPLETE: CPT | Mod: 26,50

## 2022-05-09 ENCOUNTER — APPOINTMENT (OUTPATIENT)
Age: 62
End: 2022-05-09
Payer: COMMERCIAL

## 2022-05-09 VITALS
WEIGHT: 147 LBS | DIASTOLIC BLOOD PRESSURE: 62 MMHG | BODY MASS INDEX: 25.1 KG/M2 | SYSTOLIC BLOOD PRESSURE: 110 MMHG | OXYGEN SATURATION: 99 % | RESPIRATION RATE: 14 BRPM | HEART RATE: 67 BPM | HEIGHT: 64 IN

## 2022-05-09 PROCEDURE — 94729 DIFFUSING CAPACITY: CPT

## 2022-05-09 PROCEDURE — 94727 GAS DIL/WSHOT DETER LNG VOL: CPT

## 2022-05-09 PROCEDURE — 99213 OFFICE O/P EST LOW 20 MIN: CPT | Mod: 25

## 2022-05-09 PROCEDURE — 94010 BREATHING CAPACITY TEST: CPT

## 2022-05-12 ENCOUNTER — EMERGENCY (EMERGENCY)
Facility: HOSPITAL | Age: 62
LOS: 0 days | Discharge: LEFT BEFORE DISCHARGE | End: 2022-05-12
Attending: EMERGENCY MEDICINE | Admitting: EMERGENCY MEDICINE
Payer: COMMERCIAL

## 2022-05-12 VITALS
HEART RATE: 87 BPM | WEIGHT: 145.06 LBS | SYSTOLIC BLOOD PRESSURE: 123 MMHG | OXYGEN SATURATION: 99 % | RESPIRATION RATE: 17 BRPM | TEMPERATURE: 97 F | HEIGHT: 65 IN | DIASTOLIC BLOOD PRESSURE: 58 MMHG

## 2022-05-12 DIAGNOSIS — Z98.890 OTHER SPECIFIED POSTPROCEDURAL STATES: Chronic | ICD-10-CM

## 2022-05-12 DIAGNOSIS — Z88.0 ALLERGY STATUS TO PENICILLIN: ICD-10-CM

## 2022-05-12 DIAGNOSIS — Z91.018 ALLERGY TO OTHER FOODS: ICD-10-CM

## 2022-05-12 DIAGNOSIS — M54.9 DORSALGIA, UNSPECIFIED: ICD-10-CM

## 2022-05-12 DIAGNOSIS — G89.29 OTHER CHRONIC PAIN: ICD-10-CM

## 2022-05-12 DIAGNOSIS — Z91.041 RADIOGRAPHIC DYE ALLERGY STATUS: ICD-10-CM

## 2022-05-12 DIAGNOSIS — E05.90 THYROTOXICOSIS, UNSPECIFIED WITHOUT THYROTOXIC CRISIS OR STORM: ICD-10-CM

## 2022-05-12 DIAGNOSIS — J45.909 UNSPECIFIED ASTHMA, UNCOMPLICATED: ICD-10-CM

## 2022-05-12 PROCEDURE — L9997: CPT

## 2022-05-12 RX ORDER — ACETAMINOPHEN 500 MG
650 TABLET ORAL ONCE
Refills: 0 | Status: COMPLETED | OUTPATIENT
Start: 2022-05-12 | End: 2022-05-12

## 2022-05-12 RX ORDER — METHOCARBAMOL 500 MG/1
500 TABLET, FILM COATED ORAL ONCE
Refills: 0 | Status: COMPLETED | OUTPATIENT
Start: 2022-05-12 | End: 2022-05-12

## 2022-05-12 RX ORDER — IBUPROFEN 200 MG
600 TABLET ORAL ONCE
Refills: 0 | Status: COMPLETED | OUTPATIENT
Start: 2022-05-12 | End: 2022-05-12

## 2022-05-12 NOTE — ED PROVIDER NOTE - CLINICAL SUMMARY MEDICAL DECISION MAKING FREE TEXT BOX
61yF p/w flare of her chronic back pain.  No red flags to necessitate imaging (aside from age).  Pt treated with PO meds and has appointment for further care within the week.  Pt eloped prior to reassessment and dc.

## 2022-05-12 NOTE — ED ADULT NURSE NOTE - NSICDXPASTMEDICALHX_GEN_ALL_CORE_FT
PAST MEDICAL HISTORY:  Asthma     Diverticulitis     Hiatal hernia     Hyperthyroidism     MRSA infection right lung    Sleep apnea mild with dental device, no cpap machine

## 2022-05-12 NOTE — ED PROVIDER NOTE - ATTENDING CONTRIBUTION TO CARE
61yF chronic back pain (MRI showed spinal stenosis, pt has received steroid injections, most recently ~3wk ago) p/w flare of her usual back pain.  Pt reports pain over the past 3d or so after about 3wk of relative relief after her most recent injection.  Pt has next spine appointment in 5d.  No fever, IVDU, numbness/weakness/paresthesias, saddle anesthesia or incontinence.

## 2022-05-12 NOTE — ED PROVIDER NOTE - OBJECTIVE STATEMENT
62 yo female hx of spinal stenosis presenting with similar back pain for the past 3 days, received injection 3 weeks prior and pain improved at the time. no fever, weakness, numbness, urinary symptoms, fall. Has appointment with pain management on Tuesday.

## 2022-06-09 ENCOUNTER — EMERGENCY (EMERGENCY)
Facility: HOSPITAL | Age: 62
LOS: 0 days | Discharge: HOME | End: 2022-06-09
Attending: EMERGENCY MEDICINE

## 2022-06-09 ENCOUNTER — EMERGENCY (EMERGENCY)
Facility: HOSPITAL | Age: 62
LOS: 0 days | Discharge: HOME | End: 2022-06-09
Attending: EMERGENCY MEDICINE | Admitting: EMERGENCY MEDICINE
Payer: COMMERCIAL

## 2022-06-09 VITALS
RESPIRATION RATE: 16 BRPM | SYSTOLIC BLOOD PRESSURE: 114 MMHG | DIASTOLIC BLOOD PRESSURE: 57 MMHG | HEART RATE: 81 BPM | HEIGHT: 65 IN | WEIGHT: 145.06 LBS | OXYGEN SATURATION: 97 % | TEMPERATURE: 97 F

## 2022-06-09 DIAGNOSIS — J45.901 UNSPECIFIED ASTHMA WITH (ACUTE) EXACERBATION: ICD-10-CM

## 2022-06-09 DIAGNOSIS — Z98.890 OTHER SPECIFIED POSTPROCEDURAL STATES: Chronic | ICD-10-CM

## 2022-06-09 DIAGNOSIS — Z88.0 ALLERGY STATUS TO PENICILLIN: ICD-10-CM

## 2022-06-09 DIAGNOSIS — U07.1 COVID-19: ICD-10-CM

## 2022-06-09 DIAGNOSIS — Z87.19 PERSONAL HISTORY OF OTHER DISEASES OF THE DIGESTIVE SYSTEM: ICD-10-CM

## 2022-06-09 DIAGNOSIS — E03.9 HYPOTHYROIDISM, UNSPECIFIED: ICD-10-CM

## 2022-06-09 DIAGNOSIS — R06.00 DYSPNEA, UNSPECIFIED: ICD-10-CM

## 2022-06-09 DIAGNOSIS — Z91.041 RADIOGRAPHIC DYE ALLERGY STATUS: ICD-10-CM

## 2022-06-09 DIAGNOSIS — Z91.018 ALLERGY TO OTHER FOODS: ICD-10-CM

## 2022-06-09 DIAGNOSIS — Z86.19 PERSONAL HISTORY OF OTHER INFECTIOUS AND PARASITIC DISEASES: ICD-10-CM

## 2022-06-09 PROCEDURE — 71045 X-RAY EXAM CHEST 1 VIEW: CPT | Mod: 26

## 2022-06-09 PROCEDURE — L9981: CPT

## 2022-06-09 PROCEDURE — 99284 EMERGENCY DEPT VISIT MOD MDM: CPT

## 2022-06-09 RX ORDER — ALBUTEROL 90 UG/1
3 AEROSOL, METERED ORAL
Qty: 360 | Refills: 0
Start: 2022-06-09 | End: 2022-07-08

## 2022-06-09 RX ORDER — ALBUTEROL 90 UG/1
2 AEROSOL, METERED ORAL
Qty: 1 | Refills: 0
Start: 2022-06-09 | End: 2022-07-08

## 2022-06-09 RX ORDER — IPRATROPIUM/ALBUTEROL SULFATE 18-103MCG
3 AEROSOL WITH ADAPTER (GRAM) INHALATION
Refills: 0 | Status: DISCONTINUED | OUTPATIENT
Start: 2022-06-09 | End: 2022-06-09

## 2022-06-09 RX ADMIN — Medication 60 MILLIGRAM(S): at 06:06

## 2022-06-09 RX ADMIN — Medication 3 MILLILITER(S): at 06:42

## 2022-06-09 RX ADMIN — Medication 3 MILLILITER(S): at 06:05

## 2022-06-09 NOTE — ED PROVIDER NOTE - PHYSICAL EXAMINATION
VITAL SIGNS: I have reviewed nursing notes and confirm.  CONSTITUTIONAL: Well-developed; well-nourished; in no acute distress.  SKIN: Skin exam is warm and dry, no acute rash.  HEAD: Normocephalic; atraumatic.  EYES: PERRL, EOM intact; conjunctiva and sclera clear.  ENT: clear rhinorrhea, slightly edematous TMs, normal posterior oropharynx, slightly hydroptic uvula  NECK: Supple; non tender.  CARD: S1, S2 normal; no murmurs, gallops, or rubs. Regular rate and rhythm.  RESP: diffuse trace, end expiratory wheezing  ABD: Normal bowel sounds; soft; non-distended; non-tender  EXT: Normal ROM. No clubbing, cyanosis or edema.  NEURO: Alert, oriented. Grossly unremarkable. No focal deficits.  PSYCH: Cooperative, appropriate.

## 2022-06-09 NOTE — ED PROVIDER NOTE - CLINICAL SUMMARY MEDICAL DECISION MAKING FREE TEXT BOX
CXR without infiltrate or ptx, no longer wheezing. Sx likely represent mild asthma exacerbation 2/2 covid infection.    Will dc with steroid burst, pulm follow up, prn albuterol, close monitoring of sx and return precautions.

## 2022-06-09 NOTE — ED PROVIDER NOTE - NS ED ROS FT
Constitutional: see HPI  Cardiac: see HPI  Respiratory: see HPI  ENT: mild rhinorrhea, no odynophagia, otalgia  GI: no nausea, vomiting, abdominal pain.   : No dysuria, frequency, urgency or hematuria  MS: no pain to back or extremities, no loss of ROM, no weakness  Neuro: No headache or weakness. No LOC.  Skin: No skin rash.  Except as documented in the HPI, all other systems are negative.

## 2022-06-09 NOTE — ED PROVIDER NOTE - OBJECTIVE STATEMENT
61-year-old female with history of generally well-controlled asthma, hypothyroidism, diagnosed with COVID on Monday, here for assessment of dyspnea.  Patient states that she had 2 episodes where her breath "caught" in her chest.  States that she was able to breathe slowly through her nose and symptoms improved.  No chest pain, nausea, vomiting, dizziness.    Patient had initially been afebrile since diagnosis with COVID but was febrile yesterday afternoon to 101.2.    She has currently been taking decongestant and using as needed albuterol only.

## 2022-06-09 NOTE — ED PROVIDER NOTE - PATIENT PORTAL LINK FT
You can access the FollowMyHealth Patient Portal offered by [facility] by registering at the following website: [email or web address]. By joining [employer]’s FollowMyHealth portal, you will also be able to view your health information using other applications (apps) compatible with our system.

## 2022-09-02 ENCOUNTER — APPOINTMENT (OUTPATIENT)
Age: 62
End: 2022-09-02

## 2022-09-16 ENCOUNTER — APPOINTMENT (OUTPATIENT)
Age: 62
End: 2022-09-16

## 2022-09-16 VITALS
DIASTOLIC BLOOD PRESSURE: 64 MMHG | RESPIRATION RATE: 14 BRPM | OXYGEN SATURATION: 98 % | SYSTOLIC BLOOD PRESSURE: 102 MMHG | BODY MASS INDEX: 24.92 KG/M2 | HEART RATE: 81 BPM | WEIGHT: 146 LBS | HEIGHT: 64 IN

## 2022-09-16 PROCEDURE — 71046 X-RAY EXAM CHEST 2 VIEWS: CPT

## 2022-09-16 PROCEDURE — 99214 OFFICE O/P EST MOD 30 MIN: CPT | Mod: 25

## 2022-09-16 NOTE — PROCEDURE
[FreeTextEntry1] : Reason :cough \par Overall Findings:\par \par View Ap and lateral  \par \par \par Lung Fields:\par \par Normal Lung Markings                   No Infiltrate\par \par Pleura:\par \par No Pleural Effusions                      \par \par final Interpretation: no acute evidence of pulmonary disease\par

## 2022-10-25 ENCOUNTER — OUTPATIENT (OUTPATIENT)
Dept: OUTPATIENT SERVICES | Facility: HOSPITAL | Age: 62
LOS: 1 days | Discharge: HOME | End: 2022-10-25

## 2022-10-25 DIAGNOSIS — R92.8 OTHER ABNORMAL AND INCONCLUSIVE FINDINGS ON DIAGNOSTIC IMAGING OF BREAST: ICD-10-CM

## 2022-10-25 DIAGNOSIS — Z98.890 OTHER SPECIFIED POSTPROCEDURAL STATES: Chronic | ICD-10-CM

## 2022-10-25 PROCEDURE — 77066 DX MAMMO INCL CAD BI: CPT | Mod: 26

## 2022-10-25 PROCEDURE — 76642 ULTRASOUND BREAST LIMITED: CPT | Mod: 26,RT

## 2022-10-25 PROCEDURE — G0279: CPT | Mod: 26

## 2022-10-28 ENCOUNTER — OUTPATIENT (OUTPATIENT)
Dept: OUTPATIENT SERVICES | Facility: HOSPITAL | Age: 62
LOS: 1 days | Discharge: HOME | End: 2022-10-28

## 2022-10-28 ENCOUNTER — RESULT REVIEW (OUTPATIENT)
Age: 62
End: 2022-10-28

## 2022-10-28 DIAGNOSIS — Z98.890 OTHER SPECIFIED POSTPROCEDURAL STATES: Chronic | ICD-10-CM

## 2022-10-28 PROCEDURE — 88360 TUMOR IMMUNOHISTOCHEM/MANUAL: CPT | Mod: 26

## 2022-10-28 PROCEDURE — 19083 BX BREAST 1ST LESION US IMAG: CPT | Mod: RT

## 2022-10-28 PROCEDURE — 77065 DX MAMMO INCL CAD UNI: CPT | Mod: 26,RT

## 2022-10-28 PROCEDURE — 88305 TISSUE EXAM BY PATHOLOGIST: CPT | Mod: 26

## 2022-10-31 LAB — SURGICAL PATHOLOGY STUDY: SIGNIFICANT CHANGE UP

## 2022-11-02 DIAGNOSIS — C50.911 MALIGNANT NEOPLASM OF UNSPECIFIED SITE OF RIGHT FEMALE BREAST: ICD-10-CM

## 2022-11-03 ENCOUNTER — NON-APPOINTMENT (OUTPATIENT)
Age: 62
End: 2022-11-03

## 2022-11-09 ENCOUNTER — APPOINTMENT (OUTPATIENT)
Dept: BREAST CENTER | Facility: CLINIC | Age: 62
End: 2022-11-09

## 2022-11-09 PROCEDURE — 93702 BIS XTRACELL FLUID ANALYSIS: CPT | Mod: NC

## 2022-11-09 PROCEDURE — 99205 OFFICE O/P NEW HI 60 MIN: CPT

## 2022-11-09 NOTE — PAST MEDICAL HISTORY
[Postmenopausal] : The patient is postmenopausal [Menarche Age ____] : age at menarche was [unfilled] [Total Preg ___] : G[unfilled] [Live Births ___] : P[unfilled]  [Age At Live Birth ___] : Age at live birth: [unfilled] [FreeTextEntry3] : Age 53 [FreeTextEntry6] : Denies [FreeTextEntry7] : Denies

## 2022-11-09 NOTE — PHYSICAL EXAM
[Normocephalic] : normocephalic [EOMI] : extra ocular movement intact [No Supraclavicular Adenopathy] : no supraclavicular adenopathy [No Cervical Adenopathy] : no cervical adenopathy [Normal S1, S2] : normal S1 and S2 [Examined in the supine and seated position] : examined in the supine and seated position [No dominant masses] : no dominant masses in right breast  [No dominant masses] : no dominant masses left breast [No Nipple Retraction] : no left nipple retraction [No Nipple Discharge] : no left nipple discharge [Breast Mass Right Breast ___cm] : no masses [Breast Mass Left Breast ___cm] : no masses [Breast Nipple Retraction] : nipples retracted [No Axillary Lymphadenopathy] : no left axillary lymphadenopathy [Soft] : abdomen soft [No Rashes] : no rashes [No Ulceration] : no ulceration [Breast Nipple Inversion] : nipples not inverted [Breast Nipple Fissures] : nipples not fissured [de-identified] : NL respirations [de-identified] : Bilateral nipple retraction since her menopause at 53

## 2022-11-09 NOTE — HISTORY OF PRESENT ILLNESS
[FreeTextEntry1] : Patient is a 61F who presents with diagnosis of right breast cancer.\par \par 8/4/21:\par B dx MMG for follow up of bilateral breast asymmetries --> BIRADS 3\par Dense\par Satbel asymmetries in both breasts, probably benign\par \par \par 3/14/22: \par B dx MMG and US --> BIRADS 3\par Dense\par Stable asymmetries in both breasts\par US: Right 4-5 o'clock periareolar 0.3 x 0.3 x 0.3 cm cyst\par \par 10/25/22:\par B dx mmg and R US --> BIRADS 4\par Dense\par MMG: stable asymmetries in central right and left breast; Developing mass central right breast approx 1cm\par US: Right 2N3 mass with indistinct margins 1 x 0.8 x 0.8cm --> rec Bx\par \par 10/28/22:\par USGB\par  Invasive poorly differentiated ductal carcinoma with calcifications and\par scattered single cell necrosis.\par - Ductal carcinoma in situ (DCIS), solid type, high nuclear grade.\par ER-, MA-, HER 2 pending\par ([place])\par Post bx mmg with clip in place\par \par FHx:Grandmother, endometrial cancer 54\par Uncle esophageal cancer (agent orange) 54\par \par 11/9/22:\par Denies any current complaints. has had nipple retraction bilaterally since menopause as per the patient.\par

## 2022-11-09 NOTE — ASSESSMENT
[FreeTextEntry1] : I had a lengthy discussion with this patient regarding diagnostic results, impressions, recommendations, risks and benefits. I have explained to the patient that the ultrasound needle biopsy yielded a diagnosis of right invasive breast cancer (minicork clip) and that surgical removal is necessary for definitive treatment.\par \par Breast MRI is recommended for further evaluation of the extent of disease/possible presence of multicentric and /or contralateral disease.\par \par We reviewed the treatment of breast cancer, including surgery, radiation, chemotherapy, and anti-estrogen treatment.\par \par Surgical options were reviewed, including a wide excision to negative margins with SLNB and subsequent whole breast radiation versus a mastectomy with or without reconstruction. Included in the discussion was possible reasons for unilateral vs bilateral mastectomies. She understood that one could still have a recurrence after a mastectomy. Patient understands that her overall survival is equivalent whether she undergoes a partial mastectomy with adjuvant radiotherapy or whether she undergoes a mastectomy, as evidenced by the NSABP B-06 trial. I have explained to her that while survival rates are the same between these two options, breast conservation therapy is associated with a higher risk of local recurrence, approximately 5 to 10% over 10 years (Martha et al. J Clin Oncol, 2017). This is compared to a local recurrence rate of 1% over 10 years following mastectomy.\par \par Reconstructive options for those undergoing mastectomy include implant based versus tissue flap based reconstruction options or aesthetic flat closure. Referral to a plastic surgeon would be offered if the patient decided to go forward with a mastectomy. Breast reconstruction is covered by insurance per state and federal laws.\par \par Axillary staging for her invasive cancer was discussed. She will need axillary staging regardless of if she chooses breast conservation or mastectomy. We reviewed the sentinel lymph node procedure, and how if the SLN is found to have metastatic disease that an axillary dissection of the remaining lymph nodes may be recommended. It was explained that an axillary dissection takes level one and two lymph nodes, and also level 3 nodes if they are clinically suspicious. If a sentinel node was not found, an ALND might be necessary. I have explained to the patient that if axillary dissection is performed, there is a higher risk of lymphedema, numbness, and range of motion deficit, of approximately 20% (Lea et al. Ligia Surg Oncol, 2017). We reviewed that there are very few indications for axillary dissection in the case of breast conservation, since the publication of the of the ACOSOG Z11 trial. It may not be necessary to complete an axillary dissection in select patients even if one or two sentinel nodes are positive for cancer, as long as the cancer in the nodes is confined to within the nodes, the nodes aren't matted down, the cancer meets certain criteria including being less that 5 cm, cancer is being treated by wide\par \par excision and breast radiation, and the patient is planning to undergo the recommended adjuvant therapy, and didn't have preoperative chemotherapy.\par \par The general indications for the use of adjuvant hormonal therapy, chemotherapy and targeted therapies were discussed. It was explained that medical treatments are dependent on the pathology results including the receptor status. We reviewed that her cancer is ER/CT - (HER 2 still pending). As she is ER/CT negative, anti-estrogen therapy is not given. As her cancer will either be triple negative or HER2 positive, she might need chemotherapy. The role of neoadjuvant chemotherapy was discussed with the medical oncologist (Dr. Mc), who feels she should proceed with surgical excision first.\par \par The indications for radiation therapy and common side effects were discussed. The patient will meet with a radiation oncologist post operatively. Radiation is needed after breast conservation. In addition, even with a mastectomy, radiation might be needed under certain circumstances such as close margins and positive nodes. We discussed that radiotherapy is usually given Monday through Friday for 3-5 weeks but could be longer or shorter. The patient understands that radiotherapy is typically sequenced after definitive surgery and after systemic chemotherapy, if delivered.\par \par The genetics of breast cancer were discussed. The implications for the risk of contralateral cancer and other associated cancers, implications of ovarian risk, options for management and implications of family members were discussed. I explained the rationale for performing genetic testing and the possible results including a negative test, positive test, and variance of unknown significance, along with their implications.\par \par Patient wishes to proceed with breast conservation with sentinel node biopsy followed by adjuvant external beam radiotherapy for her right breast cancer.\par \par We reviewed that the risks of the operation include, but are not limited to damage to surrounding structures, infection, bleeding, cosmetic deformity, sensory changes, need for re-excision, and anesthetic related complications and an up to 8% risk of upper extremity lymphedema with a sentinel node biopsy as quoted by the NSABP trials. She understands that with lumpectomy, if margins are positive, additional surgery would be required. In addition, if her are of ADH is shown to have cancer, she will need additional surgery. She understands that if there is extra yisel extension on the final pathology report or 3 or more positive sentinel nodes, she might need an axillary dissection.\par \par Sozo bioimpedance testing was performed with a baseline of 0.9\par \par All questions and concerns were answered.\par \par Patient is to obtain breast MRI vs a left breast US for evaluation of dense contralateral breast.\par \par Her 2 pending.\par \par Genetics pending\par \par Total time spent on encounter was greater than 60 minutes , which included face to face time with the patient, performing an exam, reviewing previous medical records, reviewing current imaging/ pathology, documenting in patient record and coordinating care/imaging. Greater than 50% of the encounter was spent on counseling and coordination of her breast issues.

## 2022-11-10 ENCOUNTER — NON-APPOINTMENT (OUTPATIENT)
Age: 62
End: 2022-11-10

## 2022-11-10 ENCOUNTER — OUTPATIENT (OUTPATIENT)
Dept: OUTPATIENT SERVICES | Facility: HOSPITAL | Age: 62
LOS: 1 days | Discharge: HOME | End: 2022-11-10

## 2022-11-10 ENCOUNTER — APPOINTMENT (OUTPATIENT)
Dept: BREAST CENTER | Facility: HOSPITAL | Age: 62
End: 2022-11-10

## 2022-11-10 ENCOUNTER — RESULT REVIEW (OUTPATIENT)
Age: 62
End: 2022-11-10

## 2022-11-10 DIAGNOSIS — R92.8 OTHER ABNORMAL AND INCONCLUSIVE FINDINGS ON DIAGNOSTIC IMAGING OF BREAST: ICD-10-CM

## 2022-11-10 DIAGNOSIS — Z98.890 OTHER SPECIFIED POSTPROCEDURAL STATES: Chronic | ICD-10-CM

## 2022-11-10 PROCEDURE — 76641 ULTRASOUND BREAST COMPLETE: CPT | Mod: 26,50

## 2022-11-14 ENCOUNTER — NON-APPOINTMENT (OUTPATIENT)
Age: 62
End: 2022-11-14

## 2022-11-16 ENCOUNTER — APPOINTMENT (OUTPATIENT)
Age: 62
End: 2022-11-16

## 2022-11-17 ENCOUNTER — NON-APPOINTMENT (OUTPATIENT)
Age: 62
End: 2022-11-17

## 2022-11-18 ENCOUNTER — OUTPATIENT (OUTPATIENT)
Dept: OUTPATIENT SERVICES | Facility: HOSPITAL | Age: 62
LOS: 1 days | Discharge: HOME | End: 2022-11-18

## 2022-11-18 VITALS
SYSTOLIC BLOOD PRESSURE: 110 MMHG | DIASTOLIC BLOOD PRESSURE: 70 MMHG | RESPIRATION RATE: 16 BRPM | OXYGEN SATURATION: 97 % | HEIGHT: 65 IN | HEART RATE: 72 BPM | WEIGHT: 139.99 LBS | TEMPERATURE: 98 F

## 2022-11-18 DIAGNOSIS — Z01.818 ENCOUNTER FOR OTHER PREPROCEDURAL EXAMINATION: ICD-10-CM

## 2022-11-18 DIAGNOSIS — C50.211 MALIGNANT NEOPLASM OF UPPER-INNER QUADRANT OF RIGHT FEMALE BREAST: ICD-10-CM

## 2022-11-18 DIAGNOSIS — Z98.890 OTHER SPECIFIED POSTPROCEDURAL STATES: Chronic | ICD-10-CM

## 2022-11-18 LAB
ALBUMIN SERPL ELPH-MCNC: 4.7 G/DL — SIGNIFICANT CHANGE UP (ref 3.5–5.2)
ALP SERPL-CCNC: 81 U/L — SIGNIFICANT CHANGE UP (ref 30–115)
ALT FLD-CCNC: 17 U/L — SIGNIFICANT CHANGE UP (ref 0–41)
ANION GAP SERPL CALC-SCNC: 11 MMOL/L — SIGNIFICANT CHANGE UP (ref 7–14)
APTT BLD: 28.2 SEC — SIGNIFICANT CHANGE UP (ref 27–39.2)
AST SERPL-CCNC: 17 U/L — SIGNIFICANT CHANGE UP (ref 0–41)
BASOPHILS # BLD AUTO: 0.07 K/UL — SIGNIFICANT CHANGE UP (ref 0–0.2)
BASOPHILS NFR BLD AUTO: 1.2 % — HIGH (ref 0–1)
BILIRUB SERPL-MCNC: 0.2 MG/DL — SIGNIFICANT CHANGE UP (ref 0.2–1.2)
BUN SERPL-MCNC: 19 MG/DL — SIGNIFICANT CHANGE UP (ref 10–20)
CALCIUM SERPL-MCNC: 9.3 MG/DL — SIGNIFICANT CHANGE UP (ref 8.4–10.5)
CHLORIDE SERPL-SCNC: 101 MMOL/L — SIGNIFICANT CHANGE UP (ref 98–110)
CO2 SERPL-SCNC: 27 MMOL/L — SIGNIFICANT CHANGE UP (ref 17–32)
CREAT SERPL-MCNC: 0.7 MG/DL — SIGNIFICANT CHANGE UP (ref 0.7–1.5)
EGFR: 98 ML/MIN/1.73M2 — SIGNIFICANT CHANGE UP
EOSINOPHIL # BLD AUTO: 0.11 K/UL — SIGNIFICANT CHANGE UP (ref 0–0.7)
EOSINOPHIL NFR BLD AUTO: 1.9 % — SIGNIFICANT CHANGE UP (ref 0–8)
GLUCOSE SERPL-MCNC: 84 MG/DL — SIGNIFICANT CHANGE UP (ref 70–99)
HCT VFR BLD CALC: 37.5 % — SIGNIFICANT CHANGE UP (ref 37–47)
HGB BLD-MCNC: 12.5 G/DL — SIGNIFICANT CHANGE UP (ref 12–16)
IMM GRANULOCYTES NFR BLD AUTO: 0.2 % — SIGNIFICANT CHANGE UP (ref 0.1–0.3)
INR BLD: 0.95 RATIO — SIGNIFICANT CHANGE UP (ref 0.65–1.3)
LYMPHOCYTES # BLD AUTO: 1.2 K/UL — SIGNIFICANT CHANGE UP (ref 1.2–3.4)
LYMPHOCYTES # BLD AUTO: 20.5 % — SIGNIFICANT CHANGE UP (ref 20.5–51.1)
MCHC RBC-ENTMCNC: 30.8 PG — SIGNIFICANT CHANGE UP (ref 27–31)
MCHC RBC-ENTMCNC: 33.3 G/DL — SIGNIFICANT CHANGE UP (ref 32–37)
MCV RBC AUTO: 92.4 FL — SIGNIFICANT CHANGE UP (ref 81–99)
MONOCYTES # BLD AUTO: 0.66 K/UL — HIGH (ref 0.1–0.6)
MONOCYTES NFR BLD AUTO: 11.3 % — HIGH (ref 1.7–9.3)
NEUTROPHILS # BLD AUTO: 3.81 K/UL — SIGNIFICANT CHANGE UP (ref 1.4–6.5)
NEUTROPHILS NFR BLD AUTO: 64.9 % — SIGNIFICANT CHANGE UP (ref 42.2–75.2)
NRBC # BLD: 0 /100 WBCS — SIGNIFICANT CHANGE UP (ref 0–0)
PLATELET # BLD AUTO: 259 K/UL — SIGNIFICANT CHANGE UP (ref 130–400)
POTASSIUM SERPL-MCNC: 4.3 MMOL/L — SIGNIFICANT CHANGE UP (ref 3.5–5)
POTASSIUM SERPL-SCNC: 4.3 MMOL/L — SIGNIFICANT CHANGE UP (ref 3.5–5)
PROT SERPL-MCNC: 6.9 G/DL — SIGNIFICANT CHANGE UP (ref 6–8)
PROTHROM AB SERPL-ACNC: 10.8 SEC — SIGNIFICANT CHANGE UP (ref 9.95–12.87)
RBC # BLD: 4.06 M/UL — LOW (ref 4.2–5.4)
RBC # FLD: 12.7 % — SIGNIFICANT CHANGE UP (ref 11.5–14.5)
SODIUM SERPL-SCNC: 139 MMOL/L — SIGNIFICANT CHANGE UP (ref 135–146)
T3 SERPL-MCNC: 94 NG/DL — SIGNIFICANT CHANGE UP (ref 80–200)
T4 AB SER-ACNC: 6.8 UG/DL — SIGNIFICANT CHANGE UP (ref 4.6–12)
TSH SERPL-MCNC: 2.67 UIU/ML — SIGNIFICANT CHANGE UP (ref 0.27–4.2)
WBC # BLD: 5.86 K/UL — SIGNIFICANT CHANGE UP (ref 4.8–10.8)
WBC # FLD AUTO: 5.86 K/UL — SIGNIFICANT CHANGE UP (ref 4.8–10.8)

## 2022-11-18 PROCEDURE — 93010 ELECTROCARDIOGRAM REPORT: CPT

## 2022-11-18 RX ORDER — TOBRAMYCIN 0.3 %
0 DROPS OPHTHALMIC (EYE)
Qty: 0 | Refills: 0 | DISCHARGE

## 2022-11-18 NOTE — H&P PST ADULT - NSICDXPASTSURGICALHX_GEN_ALL_CORE_FT
PAST SURGICAL HISTORY:  H/O colonoscopy x3    H/O endoscopy x2    H/O exploratory laparotomy at age 15-appendectomy, ovarian cystectomy

## 2022-11-18 NOTE — H&P PST ADULT - HISTORY OF PRESENT ILLNESS
61YR old female states routine mammo was abnormal and biopsy revealed RT breast "Invasive poorly differentiated ductal carcinoma"- is scheduled fo r RIGHT BREAST WIDE LOCAL EXCISION WITH NEEDLE LOCALIZATION, SENTINEL NODE MAPPING AND BIOPSY, POSSIBLE AXILLARY NODE DISSECTION. Denies COVID S/S, Recd vaccine 2 doses. Verbalized understanding of COVID prevention measures. Exercise dale 2 FOS.  Anesthesia Alert  NO--Difficult Airway  NO--History of neck surgery or radiation  NO--Limited ROM of neck  NO--History of Malignant hyperthermia  NO--Personal or family history of Pseudocholinesterase deficiency  NO--Prior Anesthesia Complication  NO--Latex Allergy  NO--Loose teeth  NO--History of Rheumatoid Arthritis  YES--ROSAURA  No Bleeding risk  NO--Other_____

## 2022-11-18 NOTE — H&P PST ADULT - RESPIRATORY
normal/clear to auscultation bilaterally/no wheezes/no rales/no rhonchi/no respiratory distress/no use of accessory muscles/airway patent/breath sounds equal/good air movement

## 2022-11-18 NOTE — H&P PST ADULT - NSICDXPASTMEDICALHX_GEN_ALL_CORE_FT
PAST MEDICAL HISTORY:  2019 novel coronavirus disease (COVID-19) 6/2022-prednisone RX    Asthma last use albuterol 9/2022-well controlled    Diverticulitis     Hiatal hernia     History of left bundle branch block (LBBB)     Hyperthyroidism     IBS (irritable bowel syndrome)     MRSA infection right lung- 2010    Sleep apnea mild with dental device, no cpap machine

## 2022-11-22 ENCOUNTER — OUTPATIENT (OUTPATIENT)
Dept: OUTPATIENT SERVICES | Facility: HOSPITAL | Age: 62
LOS: 1 days | Discharge: HOME | End: 2022-11-22

## 2022-11-22 ENCOUNTER — RESULT REVIEW (OUTPATIENT)
Age: 62
End: 2022-11-22

## 2022-11-22 DIAGNOSIS — Z98.890 OTHER SPECIFIED POSTPROCEDURAL STATES: Chronic | ICD-10-CM

## 2022-11-22 DIAGNOSIS — C50.211 MALIGNANT NEOPLASM OF UPPER-INNER QUADRANT OF RIGHT FEMALE BREAST: ICD-10-CM

## 2022-11-22 PROCEDURE — 77049 MRI BREAST C-+ W/CAD BI: CPT | Mod: 26

## 2022-11-23 ENCOUNTER — NON-APPOINTMENT (OUTPATIENT)
Age: 62
End: 2022-11-23

## 2022-11-28 PROBLEM — A49.02 METHICILLIN RESISTANT STAPHYLOCOCCUS AUREUS INFECTION, UNSPECIFIED SITE: Chronic | Status: ACTIVE | Noted: 2018-07-10

## 2022-11-28 PROBLEM — U07.1 COVID-19: Chronic | Status: ACTIVE | Noted: 2022-11-18

## 2022-11-28 PROBLEM — K58.9 IRRITABLE BOWEL SYNDROME WITHOUT DIARRHEA: Chronic | Status: ACTIVE | Noted: 2022-11-18

## 2022-11-28 PROBLEM — J45.909 UNSPECIFIED ASTHMA, UNCOMPLICATED: Chronic | Status: ACTIVE | Noted: 2018-07-10

## 2022-11-28 PROBLEM — Z86.79 PERSONAL HISTORY OF OTHER DISEASES OF THE CIRCULATORY SYSTEM: Chronic | Status: ACTIVE | Noted: 2022-11-18

## 2022-11-28 PROBLEM — K58.9 IRRITABLE BOWEL SYNDROME, UNSPECIFIED: Chronic | Status: ACTIVE | Noted: 2022-11-18

## 2022-11-29 NOTE — ED PROVIDER NOTE - CARE PLAN
Principal Discharge DX:	Weakness  Secondary Diagnosis:	Sore throat In brief, 9 year old male with known history of asthma In brief, 9 year old male with known history of asthma presenting with one day of difficulty breathing. In ED, started on albuterol/ipratropium, mag, steroids, IM Epi and terb bolus with subsequent infusion, on biPap and continuous albuterol.     Upon arrival to [address] [place] [address], remained tachypneic though with adequate oxygen saturations, diminished breath sounds B/L, though left with more air entry than right. Diffuse wheezing with prolonged expiratory phase. Speaking in short sentences. BiPap in situ. WWP, cap refill <2sec.     A/P: 9 year old with known asthma admitted for acute hypoxic respiratory failure secondary to status asthmaticus in the setting of Rhino/enterovirus infection, requiring significant bronchodilators and respiratory support. Will continue to monitor and potentially escalate terbutaline infusion and/or consider aminophylline if air entry not improving. Critically ill.    RESP: BiPap 10/5 30%  - continuous albuterol  - terbutaline infusion 0.3 currently, can increase by 0.1 q30min as needed to max of 5  - continue solumedrol 1mg/kg/dose IV q6h  - pulmonary toilet  - continuous cardiopulmonary monitoring    CV: HDS, monitor    FEN: NPO, IVF with potassium  - GI ppx  - strict Is/Os    ID: contact/droplet isolation for +R/E

## 2022-11-30 NOTE — DISCUSSION/SUMMARY
[FreeTextEntry1] : REASON FOR VISIT:\par Ms. Hernesto Stiles is a 61-year-old female who was referred by Dr. Rena Kong for cancer genetic counseling and risk assessment due to a personal history of breast cancer. The patient was seen on 2022 through [facility]. The patient was unaccompanied. \par \par RELEVANT MEDICAL AND SURGICAL HISTORY:\par The patient has a personal history of TNBC diagnosed at the age of 61. Plan is for lumpectomy at this time which is scheduled for 2022. \par \par OTHER MEDICAL AND SURGICAL HISTORY:\par • Asthma dx 27\par • Hyperthyroidism\par • Arthritis\par • Cystectomy from pelvic bone\par • Appendectomy\par • Cystectomy (2) from ovary\par \par PAST OB/GYN HISTORY:\par Obstetrical History: \par Age at Menarche: 11\par Post-Menopausal: 54\par Age at First Live Birth: 41\par Oral Contraceptive Use: None\par Hormone Replacement Therapy: None \par \par CANCER SCREENING HISTORY: \par BREAST: No prior abnormalities.\par GYN: Last visit 10/2022. Frequency: annual. Patient reported no abnormalities.\par Colon: Last colonoscopy done by Dr. William in  (3 polyps  and 3 polyps ). Frequency: 5 years.\par Skin: None\par \par SOCIAL HISTORY:\par • Tobacco-product use: None\par \par FAMILY HISTORY:\par Paternal ancestry was reported as [place] and maternal ancestry was reported as [place]. A detailed family history of cancer was ascertained, see below for pedigree. Of note:\par • Maternal grandmother with uterine cancer in her 50s \par \par The remaining family history is unremarkable. According to the patient there are no other known cases of significant cancers in the family. To her knowledge no one else in the family has had germline testing for cancer susceptibility. \par 	\par RISK ASSESSMENT:\par Ms. Stiles's personal history of cancer is suggestive of a hereditary cancer susceptibility syndrome. Variants in breast and gyn cancer susceptibility genes were of specific concern. \par 	 \par We recommended genetic testing for a breast/gyn cancers panel. This test analyzes 19 genes: TOBI, BARD1, BRCA1, BRCA2, BRIP1, CDH1, CHEK2, EPCAM, MLH1, MSH2, MSH6, NF1, PALB2, PMS2, PTEN, RAD51C, RAD51D, STK11, TP53 \par \par We discussed the risks, benefits and limitations, financial cost and implications of genetic testing. We also discussed the psychosocial implications of genetic testing. Possible test results were reviewed with the patient, along with associated medical management options. The Genetic Information Non-discrimination Act (BRYN) was also reviewed.\par \par The patient consented to the above-mentioned genetic testing panel. A sample was obtained from the patient in our clinic and will be sent to [facility] for analysis.\par \par PLAN:\par 1. The patient's sample will be sent to [facility] for analysis. \par 2. We will contact the patient once the results are available. Results generally return in 2-3 weeks.\par \par For any additional questions please call Cancer Genetics at [phone] or [phone].\par \par \par Jes Cottrell MS, [facility] [place]\par Genetic Counselor, Cancer Genetics\par \par \par

## 2022-11-30 NOTE — DISCUSSION/SUMMARY
[FreeTextEntry1] : REASON FOR VISIT:\par Ms.Dorian Stiles is a 62-year-old female who was called on 2022 for a discussion regarding her negative genetic test results related to hereditary cancer predisposition. \par \par \par RELEVANT MEDICAL AND SURGICAL HISTORY:\par The patient has a personal history of TNBC diagnosed at the age of 61. Plan is for lumpectomy at this time which is scheduled for 2022. \par \par OTHER MEDICAL AND SURGICAL HISTORY:\par • Asthma dx 27\par • Hyperthyroidism\par • Arthritis\par • Cystectomy from pelvic bone\par • Appendectomy\par • Cystectomy (2) from ovary\par \par PAST OB/GYN HISTORY:\par Obstetrical History: \par Age at Menarche: 11\par Post-Menopausal: 54\par Age at First Live Birth: 41\par Oral Contraceptive Use: None\par Hormone Replacement Therapy: None \par \par CANCER SCREENING HISTORY: \par BREAST: No prior abnormalities.\par GYN: Last visit 10/2022. Frequency: annual. Patient reported no abnormalities.\par Colon: Last colonoscopy done by Dr. William in  (3 polyps  and 3 polyps ). Frequency: 5 years.\par Skin: None\par \par SOCIAL HISTORY:\par • Tobacco-product use: None\par \par FAMILY HISTORY:\par Paternal ancestry was reported as [place] and maternal ancestry was reported as [place]. A detailed family history of cancer was ascertained, see below for pedigree. Of note:\par • Maternal grandmother with uterine cancer in her 50s \par \par The remaining family history is unremarkable. According to the patient there are no other known cases of significant cancers in the family. To her knowledge no one else in the family has had germline testing for cancer susceptibility. \par \par TEST RESULTS: NEGATIVE\par \par NO pathogenic (disease-causing) variants or variants of uncertain significance were detected in the following genes:  TOBI, BARD1, BRCA1, BRCA2, BRIP1, CDH1, CHEK2, EPCAM, MLH1, MSH2, MSH6, NF1, PALB2, PMS2, PTEN, RAD51C, RAD51D, STK11, TP53 \par \par RESULTS INTERPRETATION AND ASSESSMENT:\par Given Ms. Stiles's current reported personal and family history of cancer, and her negative genetic test results, the following screening guidelines and risk-reducing recommendations were discussed:\par • BREAST: Long-term management and surveillance should be based on the patient’s on- or post-treatment protocol as recommended by her surgeons and/or oncologist.\par • OTHER: In the absence of other indications, the patient should practice age-appropriate cancer screening for all other organ systems as recommended for the general population.\par \par It is recommended that the patient discuss the importance of pursuing cancer genetic testing and counseling with her other relatives. There may be a pathogenic variant in the family which the patient did not inherit. We would be happy to meet with her family members or refer them to a genetic expert in their area.\par \par We also discussed the limitations of negative results:\par 1. The cause of the patient’s personal history of cancer remains unknown. The cancer may have developed randomly, or due to environmental factors.  \par 2. This negative result does not completely rule out a hereditary basis for the reported history due to limitations in technology or a variant being present in an unidentified gene. \par 3. Variants in other genes would not be identified by this analysis, so this negative result does not rule out the low likelihood of having a mutation in a different hereditary cancer gene or the possibility of ever developing cancer.\par 4. It is possible there is a hereditary cancer predisposition gene mutation in the family, but the patient did not inherit it. \par \par Our knowledge of genetics and inherited cancer conditions is changing rapidly, therefore, we recommend that the patient contact our office, every 2 to 3 years, to discuss relevant advances in cancer genetics. We emphasize the importance of re-contacting us with updates regarding her personal and family history of cancer as well as any updates regarding additional cancer genetic test results performed for the patient and/or family members.  Such updates could possibly change our risk assessment and recommendations. \par \par PLAN:\par 1. Long-term management and surveillance should be based on the patient’s personal and family history and general population guidelines for all other cancers.\par 2. A copy of the genetic test results were released to the patient.\par 3. The patient was encouraged to contact us every 2-3 years to discuss relevant advances in cancer genetics, or sooner if there are any changes in her personal or family history of cancer.\par \par For any additional questions please call Cancer Genetics at [phone] or [phone].\par \par \par Jes Cottrell MS, [facility]\par Genetic Counselor, Cancer Genetics\par \par

## 2022-12-01 ENCOUNTER — RESULT REVIEW (OUTPATIENT)
Age: 62
End: 2022-12-01

## 2022-12-01 ENCOUNTER — NON-APPOINTMENT (OUTPATIENT)
Age: 62
End: 2022-12-01

## 2022-12-01 ENCOUNTER — OUTPATIENT (OUTPATIENT)
Dept: OUTPATIENT SERVICES | Facility: HOSPITAL | Age: 62
LOS: 1 days | Discharge: HOME | End: 2022-12-01

## 2022-12-01 DIAGNOSIS — Z98.890 OTHER SPECIFIED POSTPROCEDURAL STATES: Chronic | ICD-10-CM

## 2022-12-01 DIAGNOSIS — C50.919 MALIGNANT NEOPLASM OF UNSPECIFIED SITE OF UNSPECIFIED FEMALE BREAST: ICD-10-CM

## 2022-12-01 PROCEDURE — 78195 LYMPH SYSTEM IMAGING: CPT | Mod: 26

## 2022-12-02 ENCOUNTER — RESULT REVIEW (OUTPATIENT)
Age: 62
End: 2022-12-02

## 2022-12-02 ENCOUNTER — TRANSCRIPTION ENCOUNTER (OUTPATIENT)
Age: 62
End: 2022-12-02

## 2022-12-02 ENCOUNTER — OUTPATIENT (OUTPATIENT)
Dept: OUTPATIENT SERVICES | Facility: HOSPITAL | Age: 62
LOS: 1 days | Discharge: HOME | End: 2022-12-02

## 2022-12-02 ENCOUNTER — APPOINTMENT (OUTPATIENT)
Dept: BREAST CENTER | Facility: AMBULATORY SURGERY CENTER | Age: 62
End: 2022-12-02

## 2022-12-02 VITALS
RESPIRATION RATE: 18 BRPM | DIASTOLIC BLOOD PRESSURE: 56 MMHG | SYSTOLIC BLOOD PRESSURE: 108 MMHG | OXYGEN SATURATION: 97 % | HEART RATE: 70 BPM

## 2022-12-02 VITALS
HEART RATE: 64 BPM | TEMPERATURE: 98 F | RESPIRATION RATE: 18 BRPM | SYSTOLIC BLOOD PRESSURE: 118 MMHG | OXYGEN SATURATION: 99 % | HEIGHT: 65 IN | DIASTOLIC BLOOD PRESSURE: 56 MMHG | WEIGHT: 139.99 LBS

## 2022-12-02 DIAGNOSIS — Z98.890 OTHER SPECIFIED POSTPROCEDURAL STATES: Chronic | ICD-10-CM

## 2022-12-02 PROCEDURE — 19301 PARTIAL MASTECTOMY: CPT | Mod: RT

## 2022-12-02 PROCEDURE — 38900 IO MAP OF SENT LYMPH NODE: CPT | Mod: RT

## 2022-12-02 PROCEDURE — 88307 TISSUE EXAM BY PATHOLOGIST: CPT | Mod: 26

## 2022-12-02 PROCEDURE — 38525 BIOPSY/REMOVAL LYMPH NODES: CPT | Mod: RT

## 2022-12-02 PROCEDURE — 88341 IMHCHEM/IMCYTCHM EA ADD ANTB: CPT | Mod: 26

## 2022-12-02 PROCEDURE — 88342 IMHCHEM/IMCYTCHM 1ST ANTB: CPT | Mod: 26

## 2022-12-02 PROCEDURE — 19281 PERQ DEVICE BREAST 1ST IMAG: CPT | Mod: RT

## 2022-12-02 PROCEDURE — 88305 TISSUE EXAM BY PATHOLOGIST: CPT | Mod: 26

## 2022-12-02 RX ORDER — SODIUM CHLORIDE 9 MG/ML
1000 INJECTION, SOLUTION INTRAVENOUS
Refills: 0 | Status: DISCONTINUED | OUTPATIENT
Start: 2022-12-02 | End: 2022-12-16

## 2022-12-02 RX ORDER — OXYCODONE HYDROCHLORIDE 5 MG/1
5 TABLET ORAL ONCE
Refills: 0 | Status: DISCONTINUED | OUTPATIENT
Start: 2022-12-02 | End: 2022-12-02

## 2022-12-02 RX ORDER — HYDROMORPHONE HYDROCHLORIDE 2 MG/ML
0.5 INJECTION INTRAMUSCULAR; INTRAVENOUS; SUBCUTANEOUS
Refills: 0 | Status: DISCONTINUED | OUTPATIENT
Start: 2022-12-02 | End: 2022-12-02

## 2022-12-02 RX ORDER — ONDANSETRON 8 MG/1
4 TABLET, FILM COATED ORAL ONCE
Refills: 0 | Status: COMPLETED | OUTPATIENT
Start: 2022-12-02 | End: 2022-12-02

## 2022-12-02 RX ORDER — ACETAMINOPHEN 500 MG
1000 TABLET ORAL ONCE
Refills: 0 | Status: COMPLETED | OUTPATIENT
Start: 2022-12-02 | End: 2022-12-02

## 2022-12-02 RX ADMIN — ONDANSETRON 4 MILLIGRAM(S): 8 TABLET, FILM COATED ORAL at 14:33

## 2022-12-02 RX ADMIN — OXYCODONE HYDROCHLORIDE 5 MILLIGRAM(S): 5 TABLET ORAL at 13:57

## 2022-12-02 RX ADMIN — Medication 1000 MILLIGRAM(S): at 14:20

## 2022-12-02 RX ADMIN — HYDROMORPHONE HYDROCHLORIDE 0.5 MILLIGRAM(S): 2 INJECTION INTRAMUSCULAR; INTRAVENOUS; SUBCUTANEOUS at 12:54

## 2022-12-02 NOTE — BRIEF OPERATIVE NOTE - OPERATION/FINDINGS
Right breast mass excised through periareolar incision. Needle localization. [place] lymph node biopsy through separate incision in axilla. 1 hot and blue sentinel node TruNode count: 343.

## 2022-12-02 NOTE — ASU DISCHARGE PLAN (ADULT/PEDIATRIC) - FOLLOW UP APPOINTMENTS
[facility],  Endoscopy/Ambulatory Surgery [facility]:  [facility]

## 2022-12-02 NOTE — ASU DISCHARGE PLAN (ADULT/PEDIATRIC) - CARE PROVIDER_API CALL
Rena Kong (DO)  Surgery  General SurgN  [address] [facility], [facility] [address] [place] [address]  Phone: [phone]  Fax: [phone]  Follow Up Time: 2 weeks

## 2022-12-02 NOTE — BRIEF OPERATIVE NOTE - SPECIMENS
Right breast mass, right breast mass additional margins, medial superior lateral inferior anterior and posterior, and right breast sentinel lymph node
colace, protonix, vancomycin, LR@100ml/hr

## 2022-12-02 NOTE — BRIEF OPERATIVE NOTE - NSICDXBRIEFPROCEDURE_GEN_ALL_CORE_FT
PROCEDURES:  Right breast lumpectomy 02-Dec-2022 12:46:25  Renee Farias  Lumpectomy, breast, with sentinel node biopsy 02-Dec-2022 12:46:30  Renee Farias  Breast lumpectomy with sentinel node biopsy 02-Dec-2022 12:46:34  Renee Farias

## 2022-12-02 NOTE — ASU DISCHARGE PLAN (ADULT/PEDIATRIC) - NS MD DC FALL RISK RISK
For information on Fall & Injury Prevention, visit: [email or web address] OR  [email or web address] OR  https://www.cdc.gov/steadi/patient.html

## 2022-12-07 LAB — SURGICAL PATHOLOGY STUDY: SIGNIFICANT CHANGE UP

## 2022-12-08 DIAGNOSIS — Z88.0 ALLERGY STATUS TO PENICILLIN: ICD-10-CM

## 2022-12-08 DIAGNOSIS — Z91.041 RADIOGRAPHIC DYE ALLERGY STATUS: ICD-10-CM

## 2022-12-08 DIAGNOSIS — G47.33 OBSTRUCTIVE SLEEP APNEA (ADULT) (PEDIATRIC): ICD-10-CM

## 2022-12-08 DIAGNOSIS — E03.9 HYPOTHYROIDISM, UNSPECIFIED: ICD-10-CM

## 2022-12-08 DIAGNOSIS — J45.909 UNSPECIFIED ASTHMA, UNCOMPLICATED: ICD-10-CM

## 2022-12-08 DIAGNOSIS — C50.911 MALIGNANT NEOPLASM OF UNSPECIFIED SITE OF RIGHT FEMALE BREAST: ICD-10-CM

## 2022-12-08 DIAGNOSIS — Z86.16 PERSONAL HISTORY OF COVID-19: ICD-10-CM

## 2022-12-13 ENCOUNTER — APPOINTMENT (OUTPATIENT)
Dept: BREAST CENTER | Facility: CLINIC | Age: 62
End: 2022-12-13

## 2022-12-13 VITALS
BODY MASS INDEX: 24.92 KG/M2 | HEIGHT: 64 IN | DIASTOLIC BLOOD PRESSURE: 73 MMHG | WEIGHT: 146 LBS | SYSTOLIC BLOOD PRESSURE: 122 MMHG

## 2022-12-13 PROCEDURE — 99024 POSTOP FOLLOW-UP VISIT: CPT

## 2022-12-13 NOTE — PHYSICAL EXAM
[Normocephalic] : normocephalic [EOMI] : extra ocular movement intact [No Supraclavicular Adenopathy] : no supraclavicular adenopathy [No Cervical Adenopathy] : no cervical adenopathy [No dominant masses] : no dominant masses in right breast  [No dominant masses] : no dominant masses left breast [No Nipple Discharge] : no left nipple discharge [Breast Mass Left Breast ___cm] : no masses [Breast Mass Right Breast ___cm] : no masses [No Axillary Lymphadenopathy] : no left axillary lymphadenopathy [No Rashes] : no rashes [No Ulceration] : no ulceration [de-identified] : NL respirations [de-identified] : Bilateral nipple inversion since menopause; breast and axillary incisions healing well with no signs of infection [de-identified] : Bilateral nipple inversion since menopause

## 2022-12-13 NOTE — ASSESSMENT
[FreeTextEntry1] : Patient is a 61F with right IDC (-/-/-) who underwent right WLE and SLNB on 12/2/22 with findings of IDC (1.1cm). Negative margins. 0/1 node.\par \par I had a lengthy discussion with this patient regarding diagnostic results, impressions, recommendations, risks and benefits.\par \par We discussed that there is no need for additional surgery at this time.\par \par On PE: breat and axillary incisions healing well.\par \par We reviewed that her cancer is ER/LA/HER 2 -. As she is ER/LA negative, anti-estrogen therapy is not given. As her is triple negative, she might need chemotherapy, for which she will be referred to a medical oncologist.\par \par The indications for radiation therapy and common side effects were discussed. We discussed that radiotherapy is usually given Monday through Friday for 3-5 weeks but could be longer or shorter. The patient understands that radiotherapy is typically sequenced after systemic chemotherapy, if delivered.\par \par Sozo bioimpedance testing baseline was 0.9\par \par All questions and concerns were answered in detail.\par \par Patient is to follow up with medical oncology.\par \par Patient is to follow up with radiation oncology.\par \par Patient is for Dx right mmg/US in 6/2023.\par \par She will follow up with me after her imaging, pending any interval changes.\par \par Total time spent on encounter was greater than 30 minutes , which included face to face time with the patient, performing an exam, reviewing previous medical records, reviewing current imaging/ pathology, documenting in patient record and coordinating care/imaging. Greater than 50% of the encounter was spent on counseling and coordination of her breast issue.

## 2022-12-13 NOTE — HISTORY OF PRESENT ILLNESS
[FreeTextEntry1] : Patient is a 61F who presents with diagnosis of right breast cancer s/p WLE and SLNB (12/2/22) ( pT1c, pN(sn)0)\par \par FHx:Grandmother, endometrial cancer 54; Uncle esophageal cancer (agent orange) 54\par \par Her work up is as follows:\par 8/4/21:\par B dx MMG for follow up of bilateral breast asymmetries --> BIRADS 3\par Dense\par Stable asymmetries in both breasts, probably benign\par \par 3/14/22: \par B dx MMG and US --> BIRADS 3\par Dense\par Stable asymmetries in both breasts\par US: Right 4-5 o'clock periareolar 0.3 x 0.3 x 0.3 cm cyst\par \par 10/25/22:\par B dx mmg and R US --> BIRADS 4\par Dense\par MMG: stable asymmetries in central right and left breast; Developing mass central right breast approx 1cm\par US: Right 2N3 mass with indistinct margins 1 x 0.8 x 0.8cm --> rec Bx\par \par 10/28/22:\par USGB\par  Invasive poorly differentiated ductal carcinoma with calcifications and\par scattered single cell necrosis.\par - Ductal carcinoma in situ (DCIS), solid type, high nuclear grade.\par ER-, IA-, HER 2 -\par ([place])\par \par 11/10/22: B US --> BIRADS 6\par R 2N3 biopsy proven canrcinoma\par L 12N1 sub cm cyst, benign\par \par 11/22/22: MRI --> BIRADS 6\par R 1.4 x 1.2 x 0.8 cm mass in central right breast, biopsy proven cancer\par \par 12/2/22: R WLE + SLNB\par - Healing prior biopsy site with invasive poorly differentiated ductal carcinoma, 1.1 cm (microscopic measurement), with medullary features (a dominant syncytial-type growth pattern, a largely pseudo-circumscribed pushing tumor border, necrosis, and an associated diffuse chronic lymphoplasmacytic cell inflammatory infiltrate).\par - Non-extensive ductal carcinoma in situ (DCIS), solid type with comedonecrosis and calcifications, high nuclear grade.\par - No lymphovascular invasion is seen.\par - A hyalinized fibroadenoma, a small radial sclerosing lesion (radial scar)\par - All margins negative\par - One (1) benign lymph node (0/1).  Negative for carcinoma \par \par - AJCC 8th Edition Pathologic Stage: pT1c, pN(sn)0, pMx.\par \par Patient denies any current complaints. Denies any pain/f/c.\par \par

## 2023-01-18 RX ORDER — ALPRAZOLAM 0.25 MG/1
0.25 TABLET ORAL
Qty: 2 | Refills: 0 | Status: DISCONTINUED | COMMUNITY
Start: 2022-11-10 | End: 2023-01-18

## 2023-01-18 RX ORDER — PREDNISONE 20 MG/1
20 TABLET ORAL
Qty: 9 | Refills: 0 | Status: DISCONTINUED | COMMUNITY
Start: 2022-09-16 | End: 2023-01-18

## 2023-01-18 RX ORDER — AZELASTINE HYDROCHLORIDE 205.5 UG/1
0.15 SPRAY, METERED NASAL
Qty: 1 | Refills: 2 | Status: DISCONTINUED | COMMUNITY
Start: 2022-09-16 | End: 2023-01-18

## 2023-01-18 RX ORDER — FLUTICASONE PROPIONATE AND SALMETEROL 250; 50 UG/1; UG/1
250-50 POWDER RESPIRATORY (INHALATION)
Qty: 60 | Refills: 3 | Status: DISCONTINUED | COMMUNITY
Start: 2022-09-16 | End: 2023-01-18

## 2023-01-18 RX ORDER — PREDNISONE 10 MG/1
10 TABLET ORAL
Qty: 15 | Refills: 0 | Status: DISCONTINUED | COMMUNITY
Start: 2022-11-22 | End: 2023-01-18

## 2023-01-18 RX ORDER — PANTOPRAZOLE 40 MG/1
40 TABLET, DELAYED RELEASE ORAL DAILY
Qty: 30 | Refills: 6 | Status: DISCONTINUED | COMMUNITY
Start: 2021-11-16 | End: 2023-01-18

## 2023-01-19 ENCOUNTER — APPOINTMENT (OUTPATIENT)
Dept: GASTROENTEROLOGY | Facility: CLINIC | Age: 63
End: 2023-01-19
Payer: COMMERCIAL

## 2023-01-19 DIAGNOSIS — R10.30 LOWER ABDOMINAL PAIN, UNSPECIFIED: ICD-10-CM

## 2023-01-19 DIAGNOSIS — R12 HEARTBURN: ICD-10-CM

## 2023-01-19 DIAGNOSIS — Z85.3 PERSONAL HISTORY OF MALIGNANT NEOPLASM OF BREAST: ICD-10-CM

## 2023-01-19 DIAGNOSIS — Z86.39 PERSONAL HISTORY OF OTHER ENDOCRINE, NUTRITIONAL AND METABOLIC DISEASE: ICD-10-CM

## 2023-01-19 PROCEDURE — 99214 OFFICE O/P EST MOD 30 MIN: CPT | Mod: 95

## 2023-01-19 NOTE — PHYSICAL EXAM
[Alert] : alert [Sclera] : the sclera and conjunctiva were normal [Hearing Threshold Finger Rub Not [place]] : hearing was normal [Normal Appearance] : the appearance of the neck was normal [No Respiratory Distress] : no respiratory distress [Normal Color / Pigmentation] : normal skin color and pigmentation [Oriented To Time, Place, And Person] : oriented to person, place, and time

## 2023-01-19 NOTE — ASSESSMENT
[FreeTextEntry1] : 59 year old female patient average risk for CRC, fam hx of esophageal cancer, personal hx of colon TAs, hx of IBS and bloating, last colonoscopy in 2020 had 1 TA removed, and EGD in 2020 with grade 1 esophagitis and non HP gastritis.\par  No weight loss, blood in the stools or night symptoms. \par  Newly diagnosed with breast cancer currently undergoing chemotherapy, and Now she presents with severe heartburn, no dysphagia, no nausea or vomiting, no weight loss or abdominal pain or change in bowel habits.  \par \par \par Worsening GERD on chemotherapy for breast cancer\par EGD in 2020 with esophagitis\par No alarm signs \par Rec: pantoprazole/ famotidine\par Low fat and low caloric diet along with lifestyle modifications discussed with patient.\par \par \par Colon polyps\par Polyp surveillance colonoscopy in 5 years\par \par RTC PRN

## 2023-01-19 NOTE — HISTORY OF PRESENT ILLNESS
[Home] : at home, [unfilled] , at the time of the visit. [Medical Office: ([facility])___] : at the medical office located in  [Verbal consent obtained from patient] : the patient, [unfilled] [FreeTextEntry4] : Sylvia Rutherford  [de-identified] : 59 year old female patient average risk for CRC, fam hx of esophageal cancer, personal hx of colon TAs, hx of IBS and bloating, last colonoscopy in 2020 had 1 TA removed, and EGD in 2020 with grade 1 esophagitis and non HP gastritis.\par  No weight loss, blood in the stools or night symptoms. \par Newly diagnosed with breast cancer currently undergoing chemotherapy, and Now she presents with severe heartburn, no dysphagia, no nausea or vomiting, no weight loss or abdominal pain or change in bowel habits.  \par

## 2023-01-22 ENCOUNTER — NON-APPOINTMENT (OUTPATIENT)
Age: 63
End: 2023-01-22

## 2023-02-12 ENCOUNTER — INPATIENT (INPATIENT)
Facility: HOSPITAL | Age: 63
LOS: 4 days | Discharge: HOME | DRG: 871 | End: 2023-02-17
Attending: STUDENT IN AN ORGANIZED HEALTH CARE EDUCATION/TRAINING PROGRAM | Admitting: STUDENT IN AN ORGANIZED HEALTH CARE EDUCATION/TRAINING PROGRAM
Payer: COMMERCIAL

## 2023-02-12 VITALS
DIASTOLIC BLOOD PRESSURE: 64 MMHG | SYSTOLIC BLOOD PRESSURE: 135 MMHG | HEART RATE: 127 BPM | WEIGHT: 149.91 LBS | TEMPERATURE: 100 F | OXYGEN SATURATION: 97 % | HEIGHT: 65 IN | RESPIRATION RATE: 22 BRPM

## 2023-02-12 DIAGNOSIS — R06.82 TACHYPNEA, NOT ELSEWHERE CLASSIFIED: ICD-10-CM

## 2023-02-12 DIAGNOSIS — Z98.890 OTHER SPECIFIED POSTPROCEDURAL STATES: Chronic | ICD-10-CM

## 2023-02-12 DIAGNOSIS — U07.1 COVID-19: ICD-10-CM

## 2023-02-12 LAB
ALBUMIN SERPL ELPH-MCNC: 4.1 G/DL — SIGNIFICANT CHANGE UP (ref 3.5–5.2)
ALP SERPL-CCNC: 247 U/L — HIGH (ref 30–115)
ALT FLD-CCNC: 106 U/L — HIGH (ref 0–41)
ANION GAP SERPL CALC-SCNC: 9 MMOL/L — SIGNIFICANT CHANGE UP (ref 7–14)
APPEARANCE UR: CLEAR — SIGNIFICANT CHANGE UP
APTT BLD: 27.5 SEC — SIGNIFICANT CHANGE UP (ref 27–39.2)
AST SERPL-CCNC: 48 U/L — HIGH (ref 0–41)
BASE EXCESS BLDV CALC-SCNC: 4.5 MMOL/L — HIGH (ref -2–3)
BASOPHILS # BLD AUTO: 0 K/UL — SIGNIFICANT CHANGE UP (ref 0–0.2)
BASOPHILS NFR BLD AUTO: 0 % — SIGNIFICANT CHANGE UP (ref 0–1)
BILIRUB SERPL-MCNC: 0.3 MG/DL — SIGNIFICANT CHANGE UP (ref 0.2–1.2)
BILIRUB UR-MCNC: NEGATIVE — SIGNIFICANT CHANGE UP
BUN SERPL-MCNC: 9 MG/DL — LOW (ref 10–20)
CA-I SERPL-SCNC: 1.16 MMOL/L — SIGNIFICANT CHANGE UP (ref 1.15–1.33)
CALCIUM SERPL-MCNC: 8.7 MG/DL — SIGNIFICANT CHANGE UP (ref 8.4–10.5)
CHLORIDE SERPL-SCNC: 98 MMOL/L — SIGNIFICANT CHANGE UP (ref 98–110)
CO2 SERPL-SCNC: 28 MMOL/L — SIGNIFICANT CHANGE UP (ref 17–32)
COLOR SPEC: COLORLESS — SIGNIFICANT CHANGE UP
CREAT SERPL-MCNC: 0.7 MG/DL — SIGNIFICANT CHANGE UP (ref 0.7–1.5)
CRP SERPL-MCNC: 24.6 MG/L — HIGH
D DIMER BLD IA.RAPID-MCNC: 229 NG/ML DDU — SIGNIFICANT CHANGE UP
DIFF PNL FLD: NEGATIVE — SIGNIFICANT CHANGE UP
EGFR: 97 ML/MIN/1.73M2 — SIGNIFICANT CHANGE UP
EOSINOPHIL # BLD AUTO: 0 K/UL — SIGNIFICANT CHANGE UP (ref 0–0.7)
EOSINOPHIL NFR BLD AUTO: 0 % — SIGNIFICANT CHANGE UP (ref 0–8)
GAS PNL BLDV: 134 MMOL/L — LOW (ref 136–145)
GAS PNL BLDV: SIGNIFICANT CHANGE UP
GAS PNL BLDV: SIGNIFICANT CHANGE UP
GLUCOSE SERPL-MCNC: 105 MG/DL — HIGH (ref 70–99)
GLUCOSE UR QL: NEGATIVE — SIGNIFICANT CHANGE UP
HCO3 BLDV-SCNC: 31 MMOL/L — HIGH (ref 22–29)
HCT VFR BLD CALC: 32.9 % — LOW (ref 37–47)
HCT VFR BLDA CALC: 33 % — LOW (ref 39–51)
HGB BLD CALC-MCNC: 10.9 G/DL — LOW (ref 12.6–17.4)
HGB BLD-MCNC: 10.6 G/DL — LOW (ref 12–16)
HYPOCHROMIA BLD QL: SLIGHT — SIGNIFICANT CHANGE UP
INR BLD: 1.01 RATIO — SIGNIFICANT CHANGE UP (ref 0.65–1.3)
KETONES UR-MCNC: NEGATIVE — SIGNIFICANT CHANGE UP
LACTATE BLDV-MCNC: 1.1 MMOL/L — SIGNIFICANT CHANGE UP (ref 0.5–2)
LEUKOCYTE ESTERASE UR-ACNC: NEGATIVE — SIGNIFICANT CHANGE UP
LYMPHOCYTES # BLD AUTO: 0.91 K/UL — LOW (ref 1.2–3.4)
LYMPHOCYTES # BLD AUTO: 2.7 % — LOW (ref 20.5–51.1)
MACROCYTES BLD QL: SLIGHT — SIGNIFICANT CHANGE UP
MANUAL SMEAR VERIFICATION: SIGNIFICANT CHANGE UP
MCHC RBC-ENTMCNC: 29.9 PG — SIGNIFICANT CHANGE UP (ref 27–31)
MCHC RBC-ENTMCNC: 32.2 G/DL — SIGNIFICANT CHANGE UP (ref 32–37)
MCV RBC AUTO: 92.7 FL — SIGNIFICANT CHANGE UP (ref 81–99)
METAMYELOCYTES # FLD: 4.4 % — HIGH (ref 0–0)
MONOCYTES # BLD AUTO: 1.78 K/UL — HIGH (ref 0.1–0.6)
MONOCYTES NFR BLD AUTO: 5.3 % — SIGNIFICANT CHANGE UP (ref 1.7–9.3)
MRSA PCR RESULT.: NEGATIVE — SIGNIFICANT CHANGE UP
MYELOCYTES NFR BLD: 8.8 % — HIGH (ref 0–0)
NEUTROPHILS # BLD AUTO: 25.91 K/UL — HIGH (ref 1.4–6.5)
NEUTROPHILS NFR BLD AUTO: 74.3 % — SIGNIFICANT CHANGE UP (ref 42.2–75.2)
NEUTS BAND # BLD: 2.7 % — SIGNIFICANT CHANGE UP (ref 0–6)
NITRITE UR-MCNC: NEGATIVE — SIGNIFICANT CHANGE UP
NRBC # BLD: 1 /100 — HIGH (ref 0–0)
NRBC # BLD: SIGNIFICANT CHANGE UP /100 WBCS (ref 0–0)
PCO2 BLDV: 55 MMHG — HIGH (ref 39–42)
PH BLDV: 7.36 — SIGNIFICANT CHANGE UP (ref 7.32–7.43)
PH UR: 7 — SIGNIFICANT CHANGE UP (ref 5–8)
PLAT MORPH BLD: NORMAL — SIGNIFICANT CHANGE UP
PLATELET # BLD AUTO: 158 K/UL — SIGNIFICANT CHANGE UP (ref 130–400)
PO2 BLDV: 22 MMHG — SIGNIFICANT CHANGE UP
POIKILOCYTOSIS BLD QL AUTO: SLIGHT — SIGNIFICANT CHANGE UP
POLYCHROMASIA BLD QL SMEAR: SLIGHT — SIGNIFICANT CHANGE UP
POTASSIUM BLDV-SCNC: 4.3 MMOL/L — SIGNIFICANT CHANGE UP (ref 3.5–5.1)
POTASSIUM SERPL-MCNC: 4.4 MMOL/L — SIGNIFICANT CHANGE UP (ref 3.5–5)
POTASSIUM SERPL-SCNC: 4.4 MMOL/L — SIGNIFICANT CHANGE UP (ref 3.5–5)
PROMYELOCYTES # FLD: 0.9 % — HIGH (ref 0–0)
PROT SERPL-MCNC: 6.5 G/DL — SIGNIFICANT CHANGE UP (ref 6–8)
PROT UR-MCNC: NEGATIVE — SIGNIFICANT CHANGE UP
PROTHROM AB SERPL-ACNC: 11.5 SEC — SIGNIFICANT CHANGE UP (ref 9.95–12.87)
RBC # BLD: 3.55 M/UL — LOW (ref 4.2–5.4)
RBC # FLD: 14.5 % — SIGNIFICANT CHANGE UP (ref 11.5–14.5)
RBC BLD AUTO: ABNORMAL
SAO2 % BLDV: 32.7 % — SIGNIFICANT CHANGE UP
SARS-COV-2 RNA SPEC QL NAA+PROBE: DETECTED
SODIUM SERPL-SCNC: 135 MMOL/L — SIGNIFICANT CHANGE UP (ref 135–146)
SP GR SPEC: 1.02 — SIGNIFICANT CHANGE UP (ref 1.01–1.03)
SPHEROCYTES BLD QL SMEAR: SLIGHT — SIGNIFICANT CHANGE UP
TOXIC GRANULES BLD QL SMEAR: PRESENT — SIGNIFICANT CHANGE UP
TROPONIN T SERPL-MCNC: <0.01 NG/ML — SIGNIFICANT CHANGE UP
UROBILINOGEN FLD QL: SIGNIFICANT CHANGE UP
VARIANT LYMPHS # BLD: 0.9 % — SIGNIFICANT CHANGE UP (ref 0–5)
WBC # BLD: 33.65 K/UL — HIGH (ref 4.8–10.8)
WBC # FLD AUTO: 33.65 K/UL — HIGH (ref 4.8–10.8)

## 2023-02-12 PROCEDURE — 80076 HEPATIC FUNCTION PANEL: CPT

## 2023-02-12 PROCEDURE — 86850 RBC ANTIBODY SCREEN: CPT

## 2023-02-12 PROCEDURE — 86140 C-REACTIVE PROTEIN: CPT

## 2023-02-12 PROCEDURE — 76705 ECHO EXAM OF ABDOMEN: CPT

## 2023-02-12 PROCEDURE — 99223 1ST HOSP IP/OBS HIGH 75: CPT

## 2023-02-12 PROCEDURE — 86900 BLOOD TYPING SEROLOGIC ABO: CPT

## 2023-02-12 PROCEDURE — 93010 ELECTROCARDIOGRAM REPORT: CPT

## 2023-02-12 PROCEDURE — 82247 BILIRUBIN TOTAL: CPT

## 2023-02-12 PROCEDURE — 82248 BILIRUBIN DIRECT: CPT

## 2023-02-12 PROCEDURE — 71275 CT ANGIOGRAPHY CHEST: CPT | Mod: 26,MA

## 2023-02-12 PROCEDURE — 94640 AIRWAY INHALATION TREATMENT: CPT

## 2023-02-12 PROCEDURE — 83615 LACTATE (LD) (LDH) ENZYME: CPT

## 2023-02-12 PROCEDURE — 85379 FIBRIN DEGRADATION QUANT: CPT

## 2023-02-12 PROCEDURE — 84443 ASSAY THYROID STIM HORMONE: CPT

## 2023-02-12 PROCEDURE — 93970 EXTREMITY STUDY: CPT | Mod: 26

## 2023-02-12 PROCEDURE — 86803 HEPATITIS C AB TEST: CPT

## 2023-02-12 PROCEDURE — 0225U NFCT DS DNA&RNA 21 SARSCOV2: CPT

## 2023-02-12 PROCEDURE — 83036 HEMOGLOBIN GLYCOSYLATED A1C: CPT

## 2023-02-12 PROCEDURE — 76705 ECHO EXAM OF ABDOMEN: CPT | Mod: 26

## 2023-02-12 PROCEDURE — 83735 ASSAY OF MAGNESIUM: CPT

## 2023-02-12 PROCEDURE — 82728 ASSAY OF FERRITIN: CPT

## 2023-02-12 PROCEDURE — 80061 LIPID PANEL: CPT

## 2023-02-12 PROCEDURE — 85610 PROTHROMBIN TIME: CPT

## 2023-02-12 PROCEDURE — 84145 PROCALCITONIN (PCT): CPT

## 2023-02-12 PROCEDURE — 85730 THROMBOPLASTIN TIME PARTIAL: CPT

## 2023-02-12 PROCEDURE — 80053 COMPREHEN METABOLIC PANEL: CPT

## 2023-02-12 PROCEDURE — 85025 COMPLETE CBC W/AUTO DIFF WBC: CPT

## 2023-02-12 PROCEDURE — 83550 IRON BINDING TEST: CPT

## 2023-02-12 PROCEDURE — 87641 MR-STAPH DNA AMP PROBE: CPT

## 2023-02-12 PROCEDURE — 36415 COLL VENOUS BLD VENIPUNCTURE: CPT

## 2023-02-12 PROCEDURE — 83540 ASSAY OF IRON: CPT

## 2023-02-12 PROCEDURE — 86901 BLOOD TYPING SEROLOGIC RH(D): CPT

## 2023-02-12 PROCEDURE — 93970 EXTREMITY STUDY: CPT

## 2023-02-12 PROCEDURE — 71045 X-RAY EXAM CHEST 1 VIEW: CPT | Mod: 26

## 2023-02-12 PROCEDURE — 82565 ASSAY OF CREATININE: CPT

## 2023-02-12 PROCEDURE — 87640 STAPH A DNA AMP PROBE: CPT

## 2023-02-12 RX ORDER — ENOXAPARIN SODIUM 100 MG/ML
40 INJECTION SUBCUTANEOUS EVERY 24 HOURS
Refills: 0 | Status: DISCONTINUED | OUTPATIENT
Start: 2023-02-12 | End: 2023-02-17

## 2023-02-12 RX ORDER — IPRATROPIUM/ALBUTEROL SULFATE 18-103MCG
3 AEROSOL WITH ADAPTER (GRAM) INHALATION ONCE
Refills: 0 | Status: COMPLETED | OUTPATIENT
Start: 2023-02-12 | End: 2023-02-12

## 2023-02-12 RX ORDER — ESOMEPRAZOLE MAGNESIUM 40 MG/1
1 CAPSULE, DELAYED RELEASE ORAL
Qty: 0 | Refills: 0 | DISCHARGE

## 2023-02-12 RX ORDER — PANTOPRAZOLE SODIUM 20 MG/1
40 TABLET, DELAYED RELEASE ORAL
Refills: 0 | Status: DISCONTINUED | OUTPATIENT
Start: 2023-02-12 | End: 2023-02-17

## 2023-02-12 RX ORDER — FLUTICASONE PROPIONATE AND SALMETEROL 50; 250 UG/1; UG/1
0 POWDER ORAL; RESPIRATORY (INHALATION)
Qty: 0 | Refills: 0 | DISCHARGE

## 2023-02-12 RX ORDER — DIPHENHYDRAMINE HCL 50 MG
25 CAPSULE ORAL ONCE
Refills: 0 | Status: DISCONTINUED | OUTPATIENT
Start: 2023-02-12 | End: 2023-02-17

## 2023-02-12 RX ORDER — CETIRIZINE HYDROCHLORIDE 10 MG/1
0 TABLET ORAL
Qty: 0 | Refills: 0 | DISCHARGE

## 2023-02-12 RX ORDER — ACETAMINOPHEN 500 MG
650 TABLET ORAL ONCE
Refills: 0 | Status: COMPLETED | OUTPATIENT
Start: 2023-02-12 | End: 2023-02-12

## 2023-02-12 RX ORDER — FLUTICASONE PROPIONATE 50 MCG
1 SPRAY, SUSPENSION NASAL
Qty: 0 | Refills: 0 | DISCHARGE

## 2023-02-12 RX ORDER — IPRATROPIUM/ALBUTEROL SULFATE 18-103MCG
3 AEROSOL WITH ADAPTER (GRAM) INHALATION EVERY 6 HOURS
Refills: 0 | Status: DISCONTINUED | OUTPATIENT
Start: 2023-02-12 | End: 2023-02-17

## 2023-02-12 RX ORDER — BUDESONIDE AND FORMOTEROL FUMARATE DIHYDRATE 160; 4.5 UG/1; UG/1
2 AEROSOL RESPIRATORY (INHALATION)
Refills: 0 | Status: DISCONTINUED | OUTPATIENT
Start: 2023-02-12 | End: 2023-02-17

## 2023-02-12 RX ORDER — ONDANSETRON 8 MG/1
4 TABLET, FILM COATED ORAL EVERY 8 HOURS
Refills: 0 | Status: DISCONTINUED | OUTPATIENT
Start: 2023-02-12 | End: 2023-02-17

## 2023-02-12 RX ORDER — SODIUM CHLORIDE 9 MG/ML
2100 INJECTION, SOLUTION INTRAVENOUS ONCE
Refills: 0 | Status: COMPLETED | OUTPATIENT
Start: 2023-02-12 | End: 2023-02-12

## 2023-02-12 RX ORDER — CEFEPIME 1 G/1
2000 INJECTION, POWDER, FOR SOLUTION INTRAMUSCULAR; INTRAVENOUS ONCE
Refills: 0 | Status: COMPLETED | OUTPATIENT
Start: 2023-02-12 | End: 2023-02-12

## 2023-02-12 RX ORDER — REMDESIVIR 5 MG/ML
200 INJECTION INTRAVENOUS ONCE
Refills: 0 | Status: COMPLETED | OUTPATIENT
Start: 2023-02-12 | End: 2023-02-13

## 2023-02-12 RX ORDER — VANCOMYCIN HCL 1 G
1000 VIAL (EA) INTRAVENOUS ONCE
Refills: 0 | Status: COMPLETED | OUTPATIENT
Start: 2023-02-12 | End: 2023-02-12

## 2023-02-12 RX ORDER — LANOLIN ALCOHOL/MO/W.PET/CERES
3 CREAM (GRAM) TOPICAL AT BEDTIME
Refills: 0 | Status: DISCONTINUED | OUTPATIENT
Start: 2023-02-12 | End: 2023-02-17

## 2023-02-12 RX ORDER — FAMOTIDINE 10 MG/ML
40 INJECTION INTRAVENOUS AT BEDTIME
Refills: 0 | Status: DISCONTINUED | OUTPATIENT
Start: 2023-02-12 | End: 2023-02-17

## 2023-02-12 RX ORDER — FAMOTIDINE 10 MG/ML
1 INJECTION INTRAVENOUS
Qty: 0 | Refills: 0 | DISCHARGE

## 2023-02-12 RX ORDER — DIPHENHYDRAMINE HCL 50 MG
50 CAPSULE ORAL ONCE
Refills: 0 | Status: COMPLETED | OUTPATIENT
Start: 2023-02-12 | End: 2023-02-12

## 2023-02-12 RX ADMIN — Medication 250 MILLIGRAM(S): at 14:39

## 2023-02-12 RX ADMIN — Medication 3 MILLILITER(S): at 05:57

## 2023-02-12 RX ADMIN — Medication 3 MILLILITER(S): at 05:45

## 2023-02-12 RX ADMIN — Medication 650 MILLIGRAM(S): at 05:46

## 2023-02-12 RX ADMIN — Medication 125 MILLIGRAM(S): at 05:46

## 2023-02-12 RX ADMIN — Medication 102 MILLIGRAM(S): at 05:46

## 2023-02-12 RX ADMIN — Medication 3 MILLILITER(S): at 05:47

## 2023-02-12 RX ADMIN — SODIUM CHLORIDE 2100 MILLILITER(S): 9 INJECTION, SOLUTION INTRAVENOUS at 05:25

## 2023-02-12 RX ADMIN — ENOXAPARIN SODIUM 40 MILLIGRAM(S): 100 INJECTION SUBCUTANEOUS at 17:17

## 2023-02-12 RX ADMIN — Medication 60 MILLIGRAM(S): at 17:17

## 2023-02-12 RX ADMIN — CEFEPIME 100 MILLIGRAM(S): 1 INJECTION, POWDER, FOR SOLUTION INTRAMUSCULAR; INTRAVENOUS at 13:39

## 2023-02-12 NOTE — H&P ADULT - ATTENDING COMMENTS
62-year-old female with history of asthma (never intubated no ICU admissions), hyperthyroidism, breast cancer on chemo, last chemo ( february 1st 2023) presenting today with shortness of breath.     IMPRESSION  Acute respiratory distress With out  Hypoxia   with likely Asthma  Exacerbation  Possible  Super Imposed Infection   COVID Positive   > Isolation precautions (contact, droplet, airborne)   Methylprednisolone 60 mg Monitor for side effects: hyperglycemia, neurological ( agitation/confusion), adrenal suppression, bacterial and fungal infections   >Remdesivir protocol   >Continue airborne  Isolation   >Please monitor  O2 sat q8  and vitals  >Please  follow up inflammatory markers (D-dimer, CRP, ferritin) Q48-72H if clinically worsening  >Active type and screen  >Incentive spirometry at bedside  >Titrate supplemental O2 to maintain SpO2 92-96%  > APAP PRN. Symbicort, Anti-tussives PRN. Supplemental O2 PRN. ID consult. AC as per [facility] COVID Protocol  >CTA chest: No evidence of pulmonary embolus. No evidence of acute thoracic pathology.   > Leukocytosis  - f/u Cultures  CT Chest Unremarkable, Steroid Use?,    > F/u Blood CX, Urine Cx, MRSA, Urine strep and legionella, RVR Procal   > Continue  Epimeric abx   Normocytic  Anemia - f/u Anemia  w/u   Transaminitis - trend LFTs, Trend Coags  f/u US Official read   Hx Breast Cancer -  f/u OP   Dr Herrera , on chemotherapy ; finished second round february 1st 2023  Hyperthyroidism - c/w home medication 62-year-old female with history of asthma (never intubated no ICU admissions), hyperthyroidism, breast cancer on chemo, last chemo ( february 1st 2023) presenting today with shortness of breath.     VITAL SIGNS: AFebrile, vital signs stable  CONSTITUTIONAL: Well-developed; well-nourished; in no acute distress.  SKIN: Skin exam is warm and dry, no acute rash.  HEAD: Normocephalic; atraumatic.  EYES: Pupils  reactive to light, Extraocular movements intact; conjunctiva and sclera clear.  ENT: No nasal discharge; airway clear. Moist mucus membranes.  NECK: Supple; non tender. No rigidity  CARD: Rregular rate and rhythm. Normal S1, S2; no murmurs, gallops, or rubs.  RESP: CT  auscultation bilaterally. No wheezes, rales or rhonchi.  ABD: Abdomen soft; non-tender; non-distended  EXT: Normal ROM. No clubbing, cyanosis or edema.   NEURO: Alert and oriented x 3. No focal deficits.  PSYCH: cooperative, appropriate.     IMPRESSION  Acute respiratory distress With out  Hypoxia   not likely  Asthma  Exacerbation but  Possible  Super Imposed Infection   COVID Positive   > Isolation precautions (contact, droplet, airborne)   > DC  Steroids no Wheezing or respiratory distress not hypoxic   >Remdesivir protocol   >Continue airborne  Isolation   >Please monitor  O2 sat q8  and vitals  >Please  follow up inflammatory markers (D-dimer, CRP, ferritin) Q48-72H if clinically worsening  >Active type and screen  >Incentive spirometry at bedside  >Titrate supplemental O2 to maintain SpO2 92-96%  > APAP PRN. Symbicort, Anti-tussives PRN. Supplemental O2 PRN. ID consult. AC as per [facility] COVID Protocol  >CTA chest: No evidence of pulmonary embolus. No evidence of acute thoracic pathology.   > Leukocytosis  - f/u Cultures  CT Chest Unremarkable, Steroid Use?,    > F/u Blood CX, Urine Cx, MRSA, Urine strep and legionella, RVR Procal   > Continue  Epimeric abx  Vanc  and Cefepime   Normocytic  Anemia - f/u Anemia  w/u   Transaminitis - trend LFTs, Trend Coags  f/u US Official read , Possible secondary to Chemo   Hx Breast Cancer -  f/u OP   Dr Herrera , on chemotherapy ; finished second round february 1st 2023  Hyperthyroidism - c/w home medication    Seen on 02/12/23 62-year-old female with history of asthma (never intubated no ICU admissions), hyperthyroidism, breast cancer on chemo, last chemo ( february 1st 2023) presenting today with shortness of breath.     VITAL SIGNS: AFebrile, vital signs stable  CONSTITUTIONAL: Well-developed; well-nourished; in no acute distress.  SKIN: Skin exam is warm and dry, no acute rash.  HEAD: Normocephalic; atraumatic.  EYES: Pupils  reactive to light, Extraocular movements intact; conjunctiva and sclera clear.  ENT: No nasal discharge; airway clear. Moist mucus membranes.  NECK: Supple; non tender. No rigidity  CARD: Rregular rate and rhythm. Normal S1, S2; no murmurs, gallops, or rubs.  RESP: CT  auscultation bilaterally. No wheezes, rales or rhonchi.  ABD: Abdomen soft; non-tender; non-distended  EXT: Normal ROM. No clubbing, cyanosis or edema.   NEURO: Alert and oriented x 3. No focal deficits.  PSYCH: cooperative, appropriate.     IMPRESSION  SOB With out  Hypoxia   not likely  Asthma  Exacerbation but  Possible  Super Imposed Infection   COVID Positive   > Isolation precautions (contact, droplet, airborne)   > DC  Steroids no Wheezing or respiratory distress not hypoxic   >Remdesivir protocol   >Continue airborne  Isolation   >Please monitor  O2 sat q8  and vitals  >Please  follow up inflammatory markers (D-dimer, CRP, ferritin) Q48-72H if clinically worsening  >Active type and screen  >Incentive spirometry at bedside  >Titrate supplemental O2 to maintain SpO2 92-96%  > APAP PRN. Symbicort, Anti-tussives PRN. Supplemental O2 PRN. ID consult. AC as per [facility] COVID Protocol  >CTA chest: No evidence of pulmonary embolus. No evidence of acute thoracic pathology.   > Leukocytosis  - f/u Cultures  CT Chest Unremarkable, Steroid Use?,    > F/u Blood CX, Urine Cx, MRSA, Urine strep and legionella, RVR Procal   > Continue  Epimeric abx  Vanc  and Cefepime   Normocytic  Anemia - f/u Anemia  w/u   Transaminitis - trend LFTs, Trend Coags  f/u US Official read , Possible secondary to Chemo   Hx Breast Cancer -  f/u OP   Dr Herrera , on chemotherapy ; finished second round february 1st 2023  Hyperthyroidism - c/w home medication    Seen on 02/12/23

## 2023-02-12 NOTE — PATIENT PROFILE ADULT - FALL HARM RISK - HARM RISK INTERVENTIONS

## 2023-02-12 NOTE — H&P ADULT - ASSESSMENT
62-year-old female with history of asthma (never intubated no ICU admissions), hyperthyroidism, breast cancer on chemo, last chemo was at the beginning of the month presenting today with shortness of breath.  Patient states that since her second round of chemo patient has had daily fevers that her hematologist/oncologist told her were normal.  However patient tested positive for COVID on Thursday after her daughter came to visit with COVID. She endorsed having higher fevers than usual with a tmax of 101.  States she was doing well until this morning when she woke up with shortness of breath similar to her asthma exacerbations, tried pump without improvement of symptoms prompting ED visit.  Denies any chest pain.  Denies any  abdominal pain vomiting or diarrhea.  Does state that the left ankle slightly more edematous than usual however denies any calf pain, or trauma. Heme-onc Dr Kaiser.    In the ED, vitals were /64, , RR 22, T 99.7, SpO2 97% on RA. EKG showed . CXR shows no focal pulmonary consolidation or opacities. CTA showed No evidence of pulmonary embolus. No evidence of acute thoracic pathology. Labs were significant for WBC 33K, Hgb 10.6, Alk phos 247, AST 47, , VBG pCO2 55, Covid positive. Patient received 2L LR bolus as well as solumedrol 125 and benadryl in the ED.         #COVID 19   ?mild, moderate or severe illness  symptom onset?  ?Superimposed bacterial PNA  SpO2 97% on RA  - can start supplemental O2, titrate PRN, Target SpO2 92 % to 96 %  - CXR: no acute cardiopulmonary disease   - CTA chest: No evidence of pulmonary embolus. No evidence of acute thoracic pathology.   - f/u Inflammatory markers: ferritin, CRP, procalcitonin, D.Dimer  ID consult  Dexamethasone 6 mg iv q24h for 10 days (Monitor for side effects: hyperglycemia, neurological ( agitation/confusion), adrenal suppression, bacterial and fungal infections)  ?RDV/toci - Send Coag panel    #Normocytic Anemia   *denies Melena, bloody BM, coffee ground emesis  - Baseline Hgb ~12.5  - Trend H/H   - Keep active type and screen   - f/u iron profile    #Transaminitis  * Alk phos 247, AST 47,  on admission   - Check bilirubin direct and total  - Trend LFT  - Trend PT/INR and RUQ sono    #Asthma  #Breast Cancer  #Hyperthyroidism     #DVT PPX : Lovenox  #Diet: Regular  #GI PPX: n/a  #Activity: AAT  CODE FULL     62-year-old female with history of asthma (never intubated no ICU admissions), hyperthyroidism, breast cancer on chemo, last chemo ( february 1st 2023) presenting today with shortness of breath.      #COVID 19 infection - mild illness  - symptom onset: 2/9, exposed 2/6  - SpO2 97% on RA ( can start supplemental O2, titrate PRN, Target SpO2 92 % to 96 %)  - CXR: no acute cardiopulmonary disease. no focal consolidation   - CTA chest: No evidence of pulmonary embolus. No evidence of acute thoracic pathology.   - Start Remdesivir  - given leukocytosis and fevers in setting of covid and immunosuppression will give one dose of empiric abx covenrage  - f/u Inflammatory markers: ferritin, CRP, procalcitonin, D.Dimer  - ID consult for Remdesivir     #??Asthma exacerbation in setting of covid infection   - s/p solumedrol 125, start solumedrol 60 IV q 12h.  - start Duonebs Q4 and PRN and  Budesonide/formoterol 2 puffs BID   - obtain RVP panel     #Normocytic Anemia   *denies Melena, bloody BM, coffee ground emesis  - Baseline Hgb ~12.5  - Trend H/H   - Keep active type and screen   - f/u iron profile    #Transaminitis  * Alk phos 247, AST 47,  on admission   - Check bilirubin direct and total  - Trend LFT  - Trend PT/INR and RUQ sono    #Breast Cancer  - on chemotherapy ; finished second round february 1st 2023  - follows Dr Herrera  - o/p follow up     #Hyperthyroidism   - c/w methimazole 5mg daily     #DVT PPX : Lovenox  #Diet: Regular  #GI PPX: n/a  #Activity: AAT  CODE FULL     62-year-old female with history of asthma (never intubated no ICU admissions), hyperthyroidism, breast cancer on chemo, last chemo ( february 1st 2023) presenting today with shortness of breath.      #COVID 19 infection - mild illness  - symptom onset: 2/9, exposed 2/6  - SpO2 97% on RA ( can start supplemental O2, titrate PRN, Target SpO2 92 % to 96 %)  - CXR: no acute cardiopulmonary disease. no focal consolidation   - CTA chest: No evidence of pulmonary embolus. No evidence of acute thoracic pathology.   - Start Remdesivir; ordered for day one; reorder if approved by ID   - will give one dose of empiric abx covenrage  - f/u Inflammatory markers: ferritin, CRP, procalcitonin, D.Dimer  - ID consult     #??Asthma exacerbation in setting of covid infection   - s/p solumedrol 125, start solumedrol 60 IV q 12h.  - start Duonebs Q4 and PRN and  Budesonide/formoterol 2 puffs BID   - obtain RVP panel     #Normocytic Anemia   *denies Melena, bloody BM, coffee ground emesis  - Baseline Hgb ~12.5  - Trend H/H   - Keep active type and screen   - f/u iron profile    #Transaminitis  * Alk phos 247, AST 47,  on admission   - Check bilirubin direct and total  - Trend LFT  - Trend PT/INR and RUQ sono    #Breast Cancer  - on chemotherapy ; finished second round february 1st 2023  - follows Dr Herrera  - o/p follow up     #Hyperthyroidism   - c/w methimazole 5mg daily     #DVT PPX : Lovenox  #Diet: Regular  #GI PPX: n/a  #Activity: AAT  CODE FULL     62-year-old female with history of asthma (never intubated no ICU admissions), hyperthyroidism, breast cancer on chemo, last chemo ( february 1st 2023) presenting today with shortness of breath.      #COVID 19 infection - mild illness  - symptom onset: 2/9, exposed 2/6  - SpO2 97% on RA ( can start supplemental O2, titrate PRN, Target SpO2 92 % to 96 %)  - CXR: no acute cardiopulmonary disease. no focal consolidation   - CTA chest: No evidence of pulmonary embolus. No evidence of acute thoracic pathology.   - Start Remdesivir; ordered for day one; reorder if approved by ID   - will give one dose of empiric abx covenrage  - f/u Inflammatory markers: ferritin, CRP, procalcitonin, D.Dimer  - ID consult     #??Asthma exacerbation in setting of covid infection   - s/p solumedrol 125, start solumedrol 60 IV q 12h.  - start Duonebs Q4 and PRN and  Budesonide/formoterol 2 puffs BID   - obtain RVP panel     #Normocytic Anemia   *denies Melena, bloody BM, coffee ground emesis  - Baseline Hgb ~12.5  - Trend H/H ; Keep active type and screen   - f/u iron profile    #Transaminitis  *in setting of covid infection  * Alk phos 247, AST 47,  on admission   - Check bilirubin direct and total  - Trend LFT  - Trend PT/INR and RUQ sono    #Breast Cancer  - on chemotherapy ; finished second round february 1st 2023  - follows Dr Herrera  - o/p follow up     #Hyperthyroidism   - c/w methimazole 5mg daily     #DVT PPX : Lovenox  #Diet: Regular  #GI PPX: n/a  #Activity: AAT  CODE FULL

## 2023-02-12 NOTE — ED PROVIDER NOTE - CARE PLAN
fall precautions fall precautions/surgical precautions 1 Principal Discharge DX:	COVID-19  Secondary Diagnosis:	Tachypnea   Principal Discharge DX:	COVID-19  Secondary Diagnosis:	Tachypnea  Secondary Diagnosis:	Sinus tachycardia

## 2023-02-12 NOTE — H&P ADULT - HISTORY OF PRESENT ILLNESS
62-year-old female with history of asthma (never intubated no ICU admissions), hyperthyroidism, breast cancer on chemo, last chemo was at the beginning of the month presenting today with shortness of breath.  Patient states that since her second round of chemo patient has had daily fevers that her hematologist/oncologist told her were normal.  However patient tested positive for COVID on Thursday after her daughter came to visit with COVID. She endorsed having higher fevers than usual with a tmax of 101.  States she was doing well until this morning when she woke up with shortness of breath similar to her asthma exacerbations, tried pump without improvement of symptoms prompting ED visit.  Denies any chest pain.  Denies any  abdominal pain vomiting or diarrhea.  Does state that the left ankle slightly more edematous than usual however denies any calf pain, or trauma. Heme-onc Dr Kaiser.    In the ED, vitals were /64, , RR 22, T 99.7, SpO2 97% on RA. EKG showed . CTA showed No evidence of pulmonary embolus. No evidence of acute thoracic pathology. Labs were significant for WBC 33K, Hgb 10.6, Alk phos 247, AST 47, , VBG pCO2 55, Covid positive. Patient received 2L LR bolus as well as solumedrol 125 and benadryl in the ED.     Patient is i being admitted for further monitoring and management          62-year-old female with history of asthma (never intubated no ICU admissions), hyperthyroidism, breast cancer on chemo, last chemo ( february 1st 2023) presenting today with shortness of breath.  Patient states that since her second round of chemo, she has had daily fevers that her hematologist/oncologist told her were normal.  However, patient tested positive for COVID on Thursday. Her daughter tested positive on Monday. She moved out of the house and was without symptoms until Thursday. She experienced nasal congestion with blood brown mucus. Patient decided to get tested on thursday. She stated that she was fine up until saturday when  She endorsed having higher fevers than usual with a tmax of 101. She reached out to her doctor who told her to walk around as see how she does. She subsequently became short of breath on exertion and feeling like she could not breath similar to her asthma exacerbations. She tried her asthma inhalers  without improvement of symptoms prompting ED visit.  Denies any chest pain.  Denies any  abdominal pain vomiting or diarrhea.  Does state that the left ankle slightly more edematous than usual however denies any calf pain, or trauma. Heme-onc Dr Kaiser. Patient is vaccinated x3 with moderna last dose 12/21    In the ED, vitals were /64, , RR 22, T 99.7, SpO2 97% on RA. EKG showed . CTA showed No evidence of pulmonary embolus. No evidence of acute thoracic pathology. Labs were significant for WBC 33K, Hgb 10.6, Alk phos 247, AST 47, , VBG pCO2 55, Covid positive. Patient received 2L LR bolus as well as solumedrol 125 and benadryl in the ED.     Patient is being admitted for further monitoring and management

## 2023-02-12 NOTE — H&P ADULT - NSHPLABSRESULTS_GEN_ALL_CORE
LABS:                        10.6   33.65 )-----------( 158      ( 2023 05:41 )             32.9     Hb Trend: 10.6<--  WBC Trend: 33.65<--  Plt Trend: 158<--              135  |  98  |  9<L>  ----------------------------<  105<H>  4.4   |  28  |  0.7    Ca    8.7      2023 05:41    TPro  6.5  /  Alb  4.1  /  TBili  0.3  /  DBili  x   /  AST  48<H>  /  ALT  106<H>  /  AlkPhos  247<H>      Troponin T, Serum: <0.01 ng/mL (23 @ 05:41)    PT/INR - ( 2023 05:41 )   PT: 11.50 sec;   INR: 1.01 ratio         PTT - ( 2023 05:41 )  PTT:27.5 sec  Urinalysis Basic - ( 2023 09:10 )    Color: Colorless / Appearance: Clear / S.018 / pH: x  Gluc: x / Ketone: Negative  / Bili: Negative / Urobili: <2 mg/dL   Blood: x / Protein: Negative / Nitrite: Negative   Leuk Esterase: Negative / RBC: x / WBC x   Sq Epi: x / Non Sq Epi: x / Bacteria: x        IMAGING:  reviewed

## 2023-02-12 NOTE — ED PROVIDER NOTE - OBJECTIVE STATEMENT
61-year-old female with history of generally well-controlled asthma, hypothyroidism, 61-year-old female with history of generally well-controlled asthma, hypothyroidism,    I'm a cancer patient , breast cancer triple negative. I had my second chemo on February first. I was doing okay. My daughter tested positive for COVID on Monday. I woke up having asthma attack, I took a Proventil/ Today I had two fevers, usually I only have one fever a day. Today I had two. I tested positive on Thursday. My leg is also hurting me, it was a little swollen (left leg)  - patient  difficulty breathing, asthma/ leg pain 61-year-old female with history of asthma, hypothyroidism, breast cancer currently on second round of chemo presents to ED with concerns of fever and dyspnea. Pt states she was exposed to her daughter who was covid positive, and she has now also tested positive for covid. Pt notes she took Proventil PTA for her dyspnea. Of note, pt has low grade fevers at baseline with chemo tx, but most recently has been having fevers higher than usual for past day w tmax 101. Pt also reports mild L ankle minimal edema, but no pain or trauma. Denies CP, nausea, vomiting, diarrhea, abd pain, calf pain or swelling, or prior Hx of DVT/PE. Heme-onc Dr Kaiser. 61-year-old female with history of asthma, hyperthyroidism, breast cancer currently on second round of chemo presents to ED with concerns of fever and dyspnea. Pt states she was exposed to her daughter who was covid positive, and she has now also tested positive for covid. Pt notes she took Proventil PTA for her dyspnea. Of note, pt has low grade fevers at baseline with chemo tx, but most recently has been having fevers higher than usual for past day w tmax 101. Pt also reports mild L ankle minimal edema, but no pain or trauma. Denies CP, nausea, vomiting, diarrhea, abd pain, calf pain or swelling, or prior Hx of DVT/PE. Heme-onc Dr Kaiser.

## 2023-02-12 NOTE — H&P ADULT - NSHPREVIEWOFSYSTEMS_GEN_ALL_CORE
Review of Systems:  •	CONSTITUTIONAL - No fever  •	SKIN - No rash  •	HEMATOLOGIC - No abnormal bleeding or bruising  •	RESPIRATORY - No shortness of breath, No cough  •	CARDIAC -No chest pain  •	GI - No abdominal pain  •                   - No dysuria   •	ENDO - No polydipsia, No polyuria, No heat/cold intolerance  •	MUSCULOSKELETAL - No joint paint, No swelling, No back pain  All other systems negative, unless specified in HPI

## 2023-02-12 NOTE — ED PROVIDER NOTE - ATTENDING CONTRIBUTION TO CARE
62-year-old female with history of asthma, never intubated no ICU admissions, hyper thyroidism, breast cancer on chemo, last chemo was at the beginning of the month presenting today with shortness of breath.  States that since her second round of chemo patient has had daily fevers that her hematologist/oncologist told her were normal.  However patient tested positive for COVID on Thursday after her daughter came to visit with COVID.  States she was doing well until this morning when she woke up with shortness of breath similar to her asthma exacerbations, tried pump without improvement of symptoms prompting ED visit.  Denies any chest pain.  Denies any vomiting or diarrhea.  Does state that the left ankle slightly more edematous than usual however denies any calf pain or edema.    CONSTITUTIONAL: Well-developed; well-nourished; in no acute distress.   SKIN: warm, dry  HEAD: Normocephalic; atraumatic.  EYES: PERRL, EOMI, no conjunctival erythema  ENT: No nasal discharge; airway clear.  NECK: Supple; non tender.  CARD: S1, S2 normal; no murmurs, gallops, or rubs. tachycardic.   RESP: tachypneic, dec breath sounds bilaterally.   ABD: soft ntnd.   EXT: Normal ROM.  No clubbing, cyanosis or edema. pulses intact in all four extremities.   NEURO: Alert, oriented, grossly unremarkable.   PSYCH: Cooperative, appropriate.

## 2023-02-12 NOTE — ED PROVIDER NOTE - PROGRESS NOTE DETAILS
AUNDREA Nation sign out from Dr Yan awaiting CTA chest results and re-eval. CTA no PE, patient states is feeling better. DC: patient endorsed to Dr. Jamil. AUNDREA Nation spoke with patient oncologist and expected elevated WBC due to recent injection. Aware of covid dx. ADDENDUM by Kaelyn Jamil M.D.: I received sign-off from Dr. JOSEY Martínez. 62-year-old female with history of breast cancer on chemo, presents with fever, shortness of breath, chest tightness, positive COVID on Thursday, was a follow-up CTA and reassess. Patient confirms history. Significantly improved at this time though still feels a little short of breath. Denies all other symptoms. On exam, NAD, nontoxic appearing, no WOB though does appear mildly short of breath with long sentences, tachycardic with regular rhythm, lungs CTA B with no WRR, skin warm/well-perfused, no LE edema. No evidence of fluid overload. CTA negative for PE and low suspicion at this time. Character low suspicion for ACS. Patient with persistent mild shortness of breath and persistent tachycardia and will admit due to these 2. Also discussed with her oncologist and leukocytosis secondary to her recent injection no specific concern in this regard.

## 2023-02-12 NOTE — ED ADULT NURSE NOTE - FINAL NURSING ELECTRONIC SIGNATURE
Problem: At Risk for Falls  Goal: # Patient does not fall  Outcome: Outcome Met, Complete Goal  Goal: # Takes action to control fall-related risks  Outcome: Outcome Met, Complete Goal  Goal: # Verbalizes understanding of fall risk/precautions  Description: Document education using the patient education activity  Outcome: Outcome Met, Complete Goal     Problem: At Risk for Injury Due to Fall  Goal: # Patient does not fall  Outcome: Outcome Met, Complete Goal  Goal: # Takes action to control condition specific risks  Outcome: Outcome Met, Complete Goal  Goal: # Verbalizes understanding of fall-related injury personal risks  Description: Document education using the patient education activity  Outcome: Outcome Met, Complete Goal      12-Feb-2023 14:38

## 2023-02-12 NOTE — ED PROVIDER NOTE - PHYSICAL EXAMINATION
VITAL SIGNS: I have reviewed nursing notes and confirm.  CONSTITUTIONAL: non-toxic, well appearing  SKIN: no rash, no petechiae.  EYES: PERRL, EOMI, pink conjunctiva, anicteric  ENT: tongue midline, no exudates, MMM  NECK: Supple; no meningismus, no JVD  CARD: RRR, no murmurs, equal radial pulses bilaterally 2+  RESP: CTAB, no respiratory distress  ABD: Soft, non-tender, non-distended, no peritoneal signs, no HSM, no CVA tenderness  : no hernias, no lesions, no testicular swelling/tenderness  EXT: Normal ROM x4. No edema. No calves tenderness  NEURO: Alert, oriented. CN2-12 intact, equal strength bilaterally, nl gait.  PSYCH: Cooperative, appropriate. VITAL SIGNS: I have reviewed nursing notes and confirm.  CONSTITUTIONAL: + chronically ill appearing  SKIN: no rash, no petechiae.  EYES: pink conjunctiva, anicteric  ENT: tongue midline, no exudates, MMM  NECK: Supple; no meningismus  CARD: + tachycardia, RRR, no murmurs, equal radial pulses bilaterally 2+  RESP: + labored breathing while speaking, tachypnea, dec breath sounds, no crackles, no wheezing   ABD: Soft, non-tender, non-distended  EXT: Normal ROM x4. No calves tenderness, no calf swelling, + very minimal edema L ankle medial aspect   NEURO: Alert, orientedx3, no focal deficits   PSYCH: Cooperative, appropriate.

## 2023-02-12 NOTE — H&P ADULT - NSHPPHYSICALEXAM_GEN_ALL_CORE
PHYSICAL EXAM:  GENERAL: NAD, speaks in full sentences, no signs of respiratory distress  HEAD:  Atraumatic, Normocephalic  EYES: EOMI, PERRLA, anicteric sclera  NECK: Supple, No JVD  CHEST/LUNG: dec breath sounds bilaterally.  HEART: Regular rate and rhythm; No murmurs;   ABDOMEN: Soft, Nontender, Nondistended; Bowel sounds present; No guarding  EXTREMITIES:  2+ Peripheral Pulses, No cyanosis or edema  PSYCH: AAOx3  NEUROLOGY: non-focal  SKIN: No rashes or lesions GENERAL: NAD, speaks in full sentences, no signs of respiratory distress  HEAD:  Atraumatic, Normocephalic  EYES: EOMI, PERRLA, anicteric sclera  NECK: Supple, No JVD  CHEST/LUNG: dec breath sounds bilaterally. no wheezing appreciated  HEART: Regular rate and rhythm; No murmurs;   ABDOMEN: Soft, Nontender, Nondistended; Bowel sounds present; No guarding  EXTREMITIES:  2+ Peripheral Pulses, No cyanosis or edema  PSYCH: AAOx3  NEUROLOGY: non-focal  SKIN: No rashes or lesions

## 2023-02-12 NOTE — ED ADULT TRIAGE NOTE - CHIEF COMPLAINT QUOTE
I'm a cancer patient , breast cancer triple negative. I had my second chemo on February first. I was doing okay. My daughter tested positive for COVID on Monday. I woke up having asthma attack, I took a Proventil/ Today I had two fevers, usually I only have one fever a day. Today I had two. I tested positive on Thursday. My leg is also hurting me, it was a little swollen (left leg)  - patient

## 2023-02-13 LAB
A1C WITH ESTIMATED AVERAGE GLUCOSE RESULT: 6.1 % — HIGH (ref 4–5.6)
ALBUMIN SERPL ELPH-MCNC: 3.9 G/DL — SIGNIFICANT CHANGE UP (ref 3.5–5.2)
ALBUMIN SERPL ELPH-MCNC: 4.1 G/DL — SIGNIFICANT CHANGE UP (ref 3.5–5.2)
ALP SERPL-CCNC: 182 U/L — HIGH (ref 30–115)
ALP SERPL-CCNC: 205 U/L — HIGH (ref 30–115)
ALT FLD-CCNC: 111 U/L — HIGH (ref 0–41)
ALT FLD-CCNC: 97 U/L — HIGH (ref 0–41)
ANION GAP SERPL CALC-SCNC: 14 MMOL/L — SIGNIFICANT CHANGE UP (ref 7–14)
APTT BLD: 25.9 SEC — LOW (ref 27–39.2)
AST SERPL-CCNC: 45 U/L — HIGH (ref 0–41)
AST SERPL-CCNC: 67 U/L — HIGH (ref 0–41)
BASOPHILS # BLD AUTO: 0.17 K/UL — SIGNIFICANT CHANGE UP (ref 0–0.2)
BASOPHILS NFR BLD AUTO: 0.5 % — SIGNIFICANT CHANGE UP (ref 0–1)
BILIRUB DIRECT SERPL-MCNC: <0.2 MG/DL — SIGNIFICANT CHANGE UP (ref 0–0.3)
BILIRUB DIRECT SERPL-MCNC: <0.2 MG/DL — SIGNIFICANT CHANGE UP (ref 0–0.3)
BILIRUB INDIRECT FLD-MCNC: SIGNIFICANT CHANGE UP MG/DL (ref 0.2–1.2)
BILIRUB INDIRECT FLD-MCNC: SIGNIFICANT CHANGE UP MG/DL (ref 0.2–1.2)
BILIRUB SERPL-MCNC: <0.2 MG/DL — SIGNIFICANT CHANGE UP (ref 0.2–1.2)
BUN SERPL-MCNC: 12 MG/DL — SIGNIFICANT CHANGE UP (ref 10–20)
CALCIUM SERPL-MCNC: 9 MG/DL — SIGNIFICANT CHANGE UP (ref 8.4–10.5)
CHLORIDE SERPL-SCNC: 102 MMOL/L — SIGNIFICANT CHANGE UP (ref 98–110)
CHOLEST SERPL-MCNC: 171 MG/DL — SIGNIFICANT CHANGE UP
CO2 SERPL-SCNC: 24 MMOL/L — SIGNIFICANT CHANGE UP (ref 17–32)
CREAT SERPL-MCNC: 0.6 MG/DL — LOW (ref 0.7–1.5)
CREAT SERPL-MCNC: 0.7 MG/DL — SIGNIFICANT CHANGE UP (ref 0.7–1.5)
CRP SERPL-MCNC: 19.4 MG/L — HIGH
CULTURE RESULTS: SIGNIFICANT CHANGE UP
D DIMER BLD IA.RAPID-MCNC: 216 NG/ML DDU — SIGNIFICANT CHANGE UP
EGFR: 101 ML/MIN/1.73M2 — SIGNIFICANT CHANGE UP
EGFR: 97 ML/MIN/1.73M2 — SIGNIFICANT CHANGE UP
EOSINOPHIL # BLD AUTO: 0 K/UL — SIGNIFICANT CHANGE UP (ref 0–0.7)
EOSINOPHIL NFR BLD AUTO: 0 % — SIGNIFICANT CHANGE UP (ref 0–8)
ESTIMATED AVERAGE GLUCOSE: 128 MG/DL — HIGH (ref 68–114)
FERRITIN SERPL-MCNC: 1110 NG/ML — HIGH (ref 15–150)
FERRITIN SERPL-MCNC: 1111 NG/ML — HIGH (ref 15–150)
GLUCOSE SERPL-MCNC: 93 MG/DL — SIGNIFICANT CHANGE UP (ref 70–99)
HCT VFR BLD CALC: 29.3 % — LOW (ref 37–47)
HCV AB S/CO SERPL IA: 0.04 COI — SIGNIFICANT CHANGE UP
HCV AB SERPL-IMP: SIGNIFICANT CHANGE UP
HDLC SERPL-MCNC: 35 MG/DL — LOW
HGB BLD-MCNC: 9.6 G/DL — LOW (ref 12–16)
IMM GRANULOCYTES NFR BLD AUTO: 8.8 % — HIGH (ref 0.1–0.3)
INR BLD: 0.96 RATIO — SIGNIFICANT CHANGE UP (ref 0.65–1.3)
INR BLD: 0.96 RATIO — SIGNIFICANT CHANGE UP (ref 0.65–1.3)
IRON SATN MFR SERPL: 136 UG/DL — SIGNIFICANT CHANGE UP (ref 35–150)
IRON SATN MFR SERPL: 52 % — HIGH (ref 15–50)
LDH SERPL L TO P-CCNC: 579 — HIGH (ref 50–242)
LIPID PNL WITH DIRECT LDL SERPL: 113 MG/DL — HIGH
LYMPHOCYTES # BLD AUTO: 0.99 K/UL — LOW (ref 1.2–3.4)
LYMPHOCYTES # BLD AUTO: 3 % — LOW (ref 20.5–51.1)
MAGNESIUM SERPL-MCNC: 2.4 MG/DL — SIGNIFICANT CHANGE UP (ref 1.8–2.4)
MCHC RBC-ENTMCNC: 30.1 PG — SIGNIFICANT CHANGE UP (ref 27–31)
MCHC RBC-ENTMCNC: 32.8 G/DL — SIGNIFICANT CHANGE UP (ref 32–37)
MCV RBC AUTO: 91.8 FL — SIGNIFICANT CHANGE UP (ref 81–99)
MONOCYTES # BLD AUTO: 2.53 K/UL — HIGH (ref 0.1–0.6)
MONOCYTES NFR BLD AUTO: 7.6 % — SIGNIFICANT CHANGE UP (ref 1.7–9.3)
NEUTROPHILS # BLD AUTO: 26.56 K/UL — HIGH (ref 1.4–6.5)
NEUTROPHILS NFR BLD AUTO: 80.1 % — HIGH (ref 42.2–75.2)
NON HDL CHOLESTEROL: 136 MG/DL — HIGH
NRBC # BLD: 0 /100 WBCS — SIGNIFICANT CHANGE UP (ref 0–0)
PLATELET # BLD AUTO: 157 K/UL — SIGNIFICANT CHANGE UP (ref 130–400)
POTASSIUM SERPL-MCNC: 4.5 MMOL/L — SIGNIFICANT CHANGE UP (ref 3.5–5)
POTASSIUM SERPL-SCNC: 4.5 MMOL/L — SIGNIFICANT CHANGE UP (ref 3.5–5)
PROCALCITONIN SERPL-MCNC: 0.12 NG/ML — HIGH (ref 0.02–0.1)
PROT SERPL-MCNC: 6.1 G/DL — SIGNIFICANT CHANGE UP (ref 6–8)
PROT SERPL-MCNC: 6.3 G/DL — SIGNIFICANT CHANGE UP (ref 6–8)
PROTHROM AB SERPL-ACNC: 10.9 SEC — SIGNIFICANT CHANGE UP (ref 9.95–12.87)
PROTHROM AB SERPL-ACNC: 10.9 SEC — SIGNIFICANT CHANGE UP (ref 9.95–12.87)
RAPID RVP RESULT: DETECTED
RBC # BLD: 3.19 M/UL — LOW (ref 4.2–5.4)
RBC # FLD: 14.7 % — HIGH (ref 11.5–14.5)
SARS-COV-2 RNA SPEC QL NAA+PROBE: DETECTED
SODIUM SERPL-SCNC: 140 MMOL/L — SIGNIFICANT CHANGE UP (ref 135–146)
SPECIMEN SOURCE: SIGNIFICANT CHANGE UP
TIBC SERPL-MCNC: 262 UG/DL — SIGNIFICANT CHANGE UP (ref 220–430)
TRIGL SERPL-MCNC: 112 MG/DL — SIGNIFICANT CHANGE UP
TSH SERPL-MCNC: 0.36 UIU/ML — SIGNIFICANT CHANGE UP (ref 0.27–4.2)
UIBC SERPL-MCNC: 126 UG/DL — SIGNIFICANT CHANGE UP (ref 110–370)
WBC # BLD: 33.18 K/UL — HIGH (ref 4.8–10.8)
WBC # FLD AUTO: 33.18 K/UL — HIGH (ref 4.8–10.8)

## 2023-02-13 PROCEDURE — 99233 SBSQ HOSP IP/OBS HIGH 50: CPT

## 2023-02-13 RX ORDER — REMDESIVIR 5 MG/ML
100 INJECTION INTRAVENOUS EVERY 24 HOURS
Refills: 0 | Status: COMPLETED | OUTPATIENT
Start: 2023-02-14 | End: 2023-02-15

## 2023-02-13 RX ADMIN — BUDESONIDE AND FORMOTEROL FUMARATE DIHYDRATE 2 PUFF(S): 160; 4.5 AEROSOL RESPIRATORY (INHALATION) at 12:06

## 2023-02-13 RX ADMIN — REMDESIVIR 200 MILLIGRAM(S): 5 INJECTION INTRAVENOUS at 14:26

## 2023-02-13 RX ADMIN — PANTOPRAZOLE SODIUM 40 MILLIGRAM(S): 20 TABLET, DELAYED RELEASE ORAL at 05:12

## 2023-02-13 RX ADMIN — ENOXAPARIN SODIUM 40 MILLIGRAM(S): 100 INJECTION SUBCUTANEOUS at 17:43

## 2023-02-13 NOTE — PROGRESS NOTE ADULT - SUBJECTIVE AND OBJECTIVE BOX
JOHNY RODRIGUEZ 62y Female  MRN#: [id number]     Hospital Day: 1d    Pt is currently admitted with the primary diagnosis of Covid/Asthma exacerbation    SUBJECTIVE    Patient seen and examined at bedside, resting comfortably in NAD. SOB improved. Patient reports fatigue but no other complaints.                                           ----------------------------------------------------------  OBJECTIVE  PAST MEDICAL & SURGICAL HISTORY  Diverticulitis    Asthma  last use albuterol 2022-well controlled    Hyperthyroidism    Hiatal hernia    MRSA infection  right lung-     Sleep apnea  mild with dental device, no cpap machine    IBS (irritable bowel syndrome)    2019 novel coronavirus disease (COVID-19)  2022-prednisone RX    History of left bundle branch block (LBBB)    H/O exploratory laparotomy  at age 15-appendectomy, ovarian cystectomy    H/O colonoscopy  x3    H/O endoscopy  x2                                              -----------------------------------------------------------  ALLERGIES:  Apple Juice (Unknown)  IV Contrast (Other)  penicillins (Unknown)                                            ------------------------------------------------------------    HOME MEDICATIONS  Home Medications:  Advair Diskus:  (2023 12:33)  methIMAzole 5 mg oral tablet:  5 times /week (2023 12:33)  NexIUM 40 mg oral delayed release capsule: 1 cap(s) orally once a day (2023 12:33)  Pepcid 40 mg oral tablet: 1 tab(s) orally once a day (at bedtime), As Needed (2023 12:33)                           MEDICATIONS:  STANDING MEDICATIONS  albuterol/ipratropium for Nebulization 3 milliLiter(s) Nebulizer every 6 hours  budesonide 160 MICROgram(s)/formoterol 4.5 MICROgram(s) Inhaler 2 Puff(s) Inhalation two times a day  enoxaparin Injectable 40 milliGRAM(s) SubCutaneous every 24 hours  methimazole 5 milliGRAM(s) Oral daily  pantoprazole    Tablet 40 milliGRAM(s) Oral before breakfast  remdesivir  IVPB 200 milliGRAM(s) IV Intermittent once    PRN MEDICATIONS  aluminum hydroxide/magnesium hydroxide/simethicone Suspension 30 milliLiter(s) Oral every 4 hours PRN  diphenhydrAMINE Injectable 25 milliGRAM(s) IV Push once PRN  famotidine    Tablet 40 milliGRAM(s) Oral at bedtime PRN  melatonin 3 milliGRAM(s) Oral at bedtime PRN  ondansetron Injectable 4 milliGRAM(s) IV Push every 8 hours PRN                                            ------------------------------------------------------------  VITAL SIGNS: Last 24 Hours  Vital Signs Last 24 Hrs  T(C): 37.2 (2023 04:05), Max: 37.2 (2023 04:05)  T(F): 98.9 (2023 04:05), Max: 98.9 (2023 04:05)  HR: 83 (2023 04:05) (83 - 99)  BP: 120/64 (2023 04:05) (111/65 - 121/58)  BP(mean): --  RR: 17 (2023 04:05) (17 - 18)  SpO2: 97% (2023 15:48) (97% - 97%)    Parameters below as of 2023 15:48  Patient On (Oxygen Delivery Method): room air                                           --------------------------------------------------------------  LABS:               LABS:                        9.6    33.18 )-----------( 157      ( 2023 08:18 )             29.3     13    140  |  102  |  12  ----------------------------<  93  4.5   |  24  |  0.6<L>    Ca    9.0      2023 08:18  Mg     2.4         TPro  6.3  /  Alb  3.9  /  TBili  <0.2  /  DBili  <0.2  /  AST  45<H>  /  ALT  97<H>  /  AlkPhos  205<H>      PT/INR - ( 2023 08:18 )   PT: 10.90 sec;   INR: 0.96 ratio         PTT - ( 2023 08:18 )  PTT:25.9 sec       PTT - ( 2023 08:18 )  PTT:25.9 sec  Urinalysis Basic - ( 2023 09:10 )    Color: Colorless / Appearance: Clear / S.018 / pH: x  Gluc: x / Ketone: Negative  / Bili: Negative / Urobili: <2 mg/dL   Blood: x / Protein: Negative / Nitrite: Negative   Leuk Esterase: Negative / RBC: x / WBC x   Sq Epi: x / Non Sq Epi: x / Bacteria: x                CARDIAC MARKERS ( 2023 05:41 )  x     / <0.01 ng/mL / x     / x     / x                                                  -------------------------------------------------------------  RADIOLOGY:                                            --------------------------------------------------------------    PHYSICAL EXAM:  CONSTITUTIONAL: Well-developed; well-nourished; in no acute distress.  SKIN: Skin exam is warm and dry, no acute rash.  HEAD: Normocephalic; atraumatic.  EYES: Pupils  reactive to light, Extraocular movements intact; conjunctiva and sclera clear.  ENT: No nasal discharge; airway clear. Moist mucus membranes.  NECK: Supple; non tender. No rigidity  CARD: Regular rate and rhythm. Normal S1, S2; no murmurs, gallops, or rubs.  RESP: CTAB. No wheezes, rales or rhonchi.  ABD: Abdomen soft; non-tender; non-distended  EXT: Normal ROM. No clubbing, cyanosis or edema.   NEURO: Alert and oriented x 3. No focal deficits.  PSYCH: cooperative, appropriate.                                            --------------------------------------------------------------    ASSESSMENT & PLAN    62-year-old female with history of asthma (never intubated no ICU admissions), hyperthyroidism, breast cancer on chemo, last chemo (2023) presenting today with shortness of breath.      # COVID 19 infection - mild illness  - symptom onset: , exposed 2  - SpO2 97% on RA   - CXR: no acute cardiopulmonary disease, no focal consolidation   - CTA chest: No evidence of pulmonary embolus. No evidence of acute thoracic pathology.   - Start RDV for 3 days, f/u ID  - s/p 1x dose of empiric ABx Cefepime and Vancomycin   - D-dimer 216, CRP 25  - f/u ferritin, pro-marty    # ?Asthma exacerbation, in setting of covid infection   - s/p solumedrol 125,  - CTAB, no wheezing > no steroids   - c/w Duonebs q4 and PRN and Budesonide/formoterol 2 puffs BID   - RVP+, Covid+    # Normocytic Anemia   - denies Melena, bloody BM, coffee ground emesis  - Baseline Hgb ~12.5 > 10.6 > 9.6  - trend CBC, keep active T&S    # Transaminitis  * Alk phos 247, AST 47,  on admission   - Check bilirubin direct and total  - Trend LFT  - Trend PT/INR and RUQ sono    #Breast Cancer  - on chemotherapy ; finished second round 2023  - follows Dr Herrera  - o/p follow up     #Hyperthyroidism   - c/w methimazole 5mg daily     #DVT PPX : Lovenox  #Diet: Regular  #GI PPX: n/a  #Activity: AAT  CODE FULL    JOHNY RODRIGUEZ 62y Female  MRN#: [id number]     Hospital Day: 1d    Pt is currently admitted with the primary diagnosis of Covid/Asthma exacerbation    SUBJECTIVE    Patient seen and examined at bedside, resting comfortably in NAD. SOB improved. Patient reports fatigue but no other complaints.                                           ----------------------------------------------------------  OBJECTIVE  PAST MEDICAL & SURGICAL HISTORY  Diverticulitis    Asthma  last use albuterol 2022-well controlled    Hyperthyroidism    Hiatal hernia    MRSA infection  right lung-     Sleep apnea  mild with dental device, no cpap machine    IBS (irritable bowel syndrome)    2019 novel coronavirus disease (COVID-19)  2022-prednisone RX    History of left bundle branch block (LBBB)    H/O exploratory laparotomy  at age 15-appendectomy, ovarian cystectomy    H/O colonoscopy  x3    H/O endoscopy  x2                                              -----------------------------------------------------------  ALLERGIES:  Apple Juice (Unknown)  IV Contrast (Other)  penicillins (Unknown)                                            ------------------------------------------------------------    HOME MEDICATIONS  Home Medications:  Advair Diskus:  (2023 12:33)  methIMAzole 5 mg oral tablet:  5 times /week (2023 12:33)  NexIUM 40 mg oral delayed release capsule: 1 cap(s) orally once a day (2023 12:33)  Pepcid 40 mg oral tablet: 1 tab(s) orally once a day (at bedtime), As Needed (2023 12:33)                           MEDICATIONS:  STANDING MEDICATIONS  albuterol/ipratropium for Nebulization 3 milliLiter(s) Nebulizer every 6 hours  budesonide 160 MICROgram(s)/formoterol 4.5 MICROgram(s) Inhaler 2 Puff(s) Inhalation two times a day  enoxaparin Injectable 40 milliGRAM(s) SubCutaneous every 24 hours  methimazole 5 milliGRAM(s) Oral daily  pantoprazole    Tablet 40 milliGRAM(s) Oral before breakfast  remdesivir  IVPB 200 milliGRAM(s) IV Intermittent once    PRN MEDICATIONS  aluminum hydroxide/magnesium hydroxide/simethicone Suspension 30 milliLiter(s) Oral every 4 hours PRN  diphenhydrAMINE Injectable 25 milliGRAM(s) IV Push once PRN  famotidine    Tablet 40 milliGRAM(s) Oral at bedtime PRN  melatonin 3 milliGRAM(s) Oral at bedtime PRN  ondansetron Injectable 4 milliGRAM(s) IV Push every 8 hours PRN                                            ------------------------------------------------------------  VITAL SIGNS: Last 24 Hours  Vital Signs Last 24 Hrs  T(C): 37.2 (2023 04:05), Max: 37.2 (2023 04:05)  T(F): 98.9 (2023 04:05), Max: 98.9 (2023 04:05)  HR: 83 (2023 04:05) (83 - 99)  BP: 120/64 (2023 04:05) (111/65 - 121/58)  BP(mean): --  RR: 17 (2023 04:05) (17 - 18)  SpO2: 97% (2023 15:48) (97% - 97%)    Parameters below as of 2023 15:48  Patient On (Oxygen Delivery Method): room air                                           --------------------------------------------------------------  LABS:               LABS:                        9.6    33.18 )-----------( 157      ( 2023 08:18 )             29.3     13    140  |  102  |  12  ----------------------------<  93  4.5   |  24  |  0.6<L>    Ca    9.0      2023 08:18  Mg     2.4         TPro  6.3  /  Alb  3.9  /  TBili  <0.2  /  DBili  <0.2  /  AST  45<H>  /  ALT  97<H>  /  AlkPhos  205<H>      PT/INR - ( 2023 08:18 )   PT: 10.90 sec;   INR: 0.96 ratio         PTT - ( 2023 08:18 )  PTT:25.9 sec       PTT - ( 2023 08:18 )  PTT:25.9 sec  Urinalysis Basic - ( 2023 09:10 )    Color: Colorless / Appearance: Clear / S.018 / pH: x  Gluc: x / Ketone: Negative  / Bili: Negative / Urobili: <2 mg/dL   Blood: x / Protein: Negative / Nitrite: Negative   Leuk Esterase: Negative / RBC: x / WBC x   Sq Epi: x / Non Sq Epi: x / Bacteria: x                CARDIAC MARKERS ( 2023 05:41 )  x     / <0.01 ng/mL / x     / x     / x                                                  -------------------------------------------------------------  RADIOLOGY:                                            --------------------------------------------------------------    PHYSICAL EXAM:  CONSTITUTIONAL: Well-developed; well-nourished; in no acute distress.  SKIN: Skin exam is warm and dry, no acute rash.  HEAD: Normocephalic; atraumatic.  EYES: Pupils  reactive to light, Extraocular movements intact; conjunctiva and sclera clear.  ENT: No nasal discharge; airway clear. Moist mucus membranes.  NECK: Supple; non tender. No rigidity  CARD: Regular rate and rhythm. Normal S1, S2; no murmurs, gallops, or rubs.  RESP: CTAB. No wheezes, rales or rhonchi.  ABD: Abdomen soft; non-tender; non-distended  EXT: Normal ROM. No clubbing, cyanosis or edema.   NEURO: Alert and oriented x 3. No focal deficits.  PSYCH: cooperative, appropriate.                                            --------------------------------------------------------------    ASSESSMENT & PLAN    62-year-old female with history of asthma (never intubated no ICU admissions), hyperthyroidism, breast cancer on chemo, last chemo (2023) presenting today with shortness of breath.      # COVID 19 infection - mild illness  - symptom onset: , exposed 2  - SpO2 97% on RA   - CXR: no acute cardiopulmonary disease, no focal consolidation   - CTA chest: No evidence of pulmonary embolus. No evidence of acute thoracic pathology.   - Start RDV for 3 days, patient is agreeable, f/u ID  - s/p 1x dose of empiric ABx Cefepime and Vancomycin   - D-dimer 216, CRP 25  - f/u ferritin, pro-marty    # ?Asthma exacerbation, in setting of covid infection   - s/p solumedrol 125,  - CTAB, no wheezing > no steroids   - c/w Duonebs q4 and PRN and Budesonide/formoterol 2 puffs BID   - RVP+, Covid+    # Normocytic Anemia   - denies Melena, bloody BM, coffee ground emesis  - Baseline Hgb ~12.5 > 10.6 > 9.6  - trend CBC, keep active T&S    # Transaminitis, downtrending   - >205  - AST 48>45  - >97  - bili wnl  - trend LFTs, PT/INR  - RUQ sono: negative     # Breast Cancer  - on chemotherapy (Taxotene and Cytocan); finished second round 2023  - follows Dr Herrera  - o/p follow up     # Hyperthyroidism   - c/w Methimazole 5mg qd    #DVT PPX : Lovenox  #Diet: Regular  #GI PPX: n/a  #Activity: AAT  #Code: full   JOHNY RODRIGUEZ 62y Female  MRN#: [id number]     Hospital Day: 1d    Pt is currently admitted with the primary diagnosis of Covid/Asthma exacerbation    SUBJECTIVE    Patient seen and examined at bedside, resting comfortably in NAD. SOB improved. Patient reports fatigue but no other complaints.                                           ----------------------------------------------------------  OBJECTIVE  PAST MEDICAL & SURGICAL HISTORY  Diverticulitis    Asthma  last use albuterol 2022-well controlled    Hyperthyroidism    Hiatal hernia    MRSA infection  right lung-     Sleep apnea  mild with dental device, no cpap machine    IBS (irritable bowel syndrome)    2019 novel coronavirus disease (COVID-19)  2022-prednisone RX    History of left bundle branch block (LBBB)    H/O exploratory laparotomy  at age 15-appendectomy, ovarian cystectomy    H/O colonoscopy  x3    H/O endoscopy  x2                                              -----------------------------------------------------------  ALLERGIES:  Apple Juice (Unknown)  IV Contrast (Other)  penicillins (Unknown)                                            ------------------------------------------------------------    HOME MEDICATIONS  Home Medications:  Advair Diskus:  (2023 12:33)  methIMAzole 5 mg oral tablet:  5 times /week (2023 12:33)  NexIUM 40 mg oral delayed release capsule: 1 cap(s) orally once a day (2023 12:33)  Pepcid 40 mg oral tablet: 1 tab(s) orally once a day (at bedtime), As Needed (2023 12:33)                           MEDICATIONS:  STANDING MEDICATIONS  albuterol/ipratropium for Nebulization 3 milliLiter(s) Nebulizer every 6 hours  budesonide 160 MICROgram(s)/formoterol 4.5 MICROgram(s) Inhaler 2 Puff(s) Inhalation two times a day  enoxaparin Injectable 40 milliGRAM(s) SubCutaneous every 24 hours  methimazole 5 milliGRAM(s) Oral daily  pantoprazole    Tablet 40 milliGRAM(s) Oral before breakfast  remdesivir  IVPB 200 milliGRAM(s) IV Intermittent once    PRN MEDICATIONS  aluminum hydroxide/magnesium hydroxide/simethicone Suspension 30 milliLiter(s) Oral every 4 hours PRN  diphenhydrAMINE Injectable 25 milliGRAM(s) IV Push once PRN  famotidine    Tablet 40 milliGRAM(s) Oral at bedtime PRN  melatonin 3 milliGRAM(s) Oral at bedtime PRN  ondansetron Injectable 4 milliGRAM(s) IV Push every 8 hours PRN                                            ------------------------------------------------------------  VITAL SIGNS: Last 24 Hours  Vital Signs Last 24 Hrs  T(C): 37.2 (2023 04:05), Max: 37.2 (2023 04:05)  T(F): 98.9 (2023 04:05), Max: 98.9 (2023 04:05)  HR: 83 (2023 04:05) (83 - 99)  BP: 120/64 (2023 04:05) (111/65 - 121/58)  BP(mean): --  RR: 17 (2023 04:05) (17 - 18)  SpO2: 97% (2023 15:48) (97% - 97%)    Parameters below as of 2023 15:48  Patient On (Oxygen Delivery Method): room air                                           --------------------------------------------------------------  LABS:               LABS:                        9.6    33.18 )-----------( 157      ( 2023 08:18 )             29.3     13    140  |  102  |  12  ----------------------------<  93  4.5   |  24  |  0.6<L>    Ca    9.0      2023 08:18  Mg     2.4         TPro  6.3  /  Alb  3.9  /  TBili  <0.2  /  DBili  <0.2  /  AST  45<H>  /  ALT  97<H>  /  AlkPhos  205<H>      PT/INR - ( 2023 08:18 )   PT: 10.90 sec;   INR: 0.96 ratio         PTT - ( 2023 08:18 )  PTT:25.9 sec       PTT - ( 2023 08:18 )  PTT:25.9 sec  Urinalysis Basic - ( 2023 09:10 )    Color: Colorless / Appearance: Clear / S.018 / pH: x  Gluc: x / Ketone: Negative  / Bili: Negative / Urobili: <2 mg/dL   Blood: x / Protein: Negative / Nitrite: Negative   Leuk Esterase: Negative / RBC: x / WBC x   Sq Epi: x / Non Sq Epi: x / Bacteria: x                CARDIAC MARKERS ( 2023 05:41 )  x     / <0.01 ng/mL / x     / x     / x                                                  -------------------------------------------------------------  RADIOLOGY:                                            --------------------------------------------------------------    PHYSICAL EXAM:  CONSTITUTIONAL: Well-developed; well-nourished; in no acute distress.  SKIN: Skin exam is warm and dry, no acute rash.  HEAD: Normocephalic; atraumatic.  EYES: Pupils  reactive to light, Extraocular movements intact; conjunctiva and sclera clear.  ENT: No nasal discharge; airway clear. Moist mucus membranes.  NECK: Supple; non tender. No rigidity  CARD: Regular rate and rhythm. Normal S1, S2; no murmurs, gallops, or rubs.  RESP: CTAB. No wheezes, rales or rhonchi.  ABD: Abdomen soft; non-tender; non-distended  EXT: Normal ROM. No clubbing, cyanosis or edema.   NEURO: Alert and oriented x 3. No focal deficits.  PSYCH: cooperative, appropriate.                                            --------------------------------------------------------------    ASSESSMENT & PLAN    62-year-old female with history of asthma (never intubated no ICU admissions), hyperthyroidism, breast cancer on chemo, last chemo (2023) presenting today with shortness of breath.      # COVID 19 infection - mild illness  - symptom onset: , exposed   - SpO2 97% on RA   - CXR: no acute cardiopulmonary disease, no focal consolidation   - CTA chest: No evidence of pulmonary embolus. No evidence of acute thoracic pathology.   - Start RDV for 3 days, patient is agreeable, f/u ID  - s/p 1x dose of empiric ABx Cefepime and Vancomycin   - D-dimer 216, CRP 25  - f/u ferritin, pro-marty    # ?Asthma exacerbation, in setting of covid infection   - s/p solumedrol 125  - CTAB, no wheezing > no steroids   - c/w Duonebs q4 and PRN and Budesonide/formoterol 2 puffs BID   - RVP+, Covid+    # Normocytic Anemia   - denies Melena, bloody BM, coffee ground emesis  - Baseline Hgb ~12.5 > 10.6 > 9.6  - trend CBC, keep active T&S    # Elevated WBC  - s/p Neupogen on 23  - 30k, monitor    # Transaminitis, downtrending   - >205  - AST 48>45  - >97  - bili wnl  - trend LFTs, PT/INR  - RUQ sono: negative     # Breast Cancer  - on chemotherapy (Taxotene and Cytocan); finished second round 2023  - follows Dr Herrera  - o/p follow up     # Hyperthyroidism   - c/w Methimazole 5mg qd    #DVT PPX : Lovenox  #Diet: Regular  #GI PPX: n/a  #Activity: AAT  #Code: full

## 2023-02-13 NOTE — PROGRESS NOTE ADULT - SUBJECTIVE AND OBJECTIVE BOX
JOHNY RODRIGUEZ  62y Female    INTERVAL HPI/OVERNIGHT EVENTS:    pt seen with  at bedside this morning  she feels better.  no fever, less SOB, no pain  no N/V, diarrhea    T(F): 98.9 (02-13-23 @ 04:05), Max: 98.9 (02-13-23 @ 04:05)  HR: 83 (02-13-23 @ 04:05) (83 - 99)  BP: 120/64 (02-13-23 @ 04:05) (111/65 - 121/58)  RR: 17 (02-13-23 @ 04:05) (17 - 18)  SpO2: 97% (02-12-23 @ 15:48) (97% - 97%) on RA      PHYSICAL EXAM:  GENERAL: NAD sitting in a chair  HEAD:  Normocephalic  EYES:  conjunctiva and sclera clear  ENMT: Moist mucous membranes  NERVOUS SYSTEM:  Alert, awake, Good concentration  CHEST/LUNG: CTA b/l; No rales, rhonchi, wheezing  HEART: Regular rate and rhythm  ABDOMEN: Soft, Nontender, Nondistended; Bowel sounds present  EXTREMITIES:   No edema  SKIN: warm, dry    Consultant(s) Notes Reviewed:  [x ] YES  [ ] NO  Care Discussed with Consultants/Other Providers [ x] YES  [ ] NO    MEDICATIONS  (STANDING):  albuterol/ipratropium for Nebulization 3 milliLiter(s) Nebulizer every 6 hours  budesonide 160 MICROgram(s)/formoterol 4.5 MICROgram(s) Inhaler 2 Puff(s) Inhalation two times a day  enoxaparin Injectable 40 milliGRAM(s) SubCutaneous every 24 hours  methimazole 5 milliGRAM(s) Oral daily  pantoprazole    Tablet 40 milliGRAM(s) Oral before breakfast  remdesivir  IVPB 200 milliGRAM(s) IV Intermittent once    MEDICATIONS  (PRN):  aluminum hydroxide/magnesium hydroxide/simethicone Suspension 30 milliLiter(s) Oral every 4 hours PRN Dyspepsia  diphenhydrAMINE Injectable 25 milliGRAM(s) IV Push once PRN Itching  famotidine    Tablet 40 milliGRAM(s) Oral at bedtime PRN reflux  melatonin 3 milliGRAM(s) Oral at bedtime PRN Insomnia  ondansetron Injectable 4 milliGRAM(s) IV Push every 8 hours PRN Nausea and/or Vomiting      LABS:                        9.6    33.18 )-----------( 157      ( 13 Feb 2023 08:18 )             29.3     02-13    140  |  102  |  12  ----------------------------<  93  4.5   |  24  |  0.6<L>    Ca    9.0      13 Feb 2023 08:18  Mg     2.4     02-13    TPro  6.3  /  Alb  3.9  /  TBili  <0.2  /  DBili  <0.2  /  AST  45<H>  /  ALT  97<H>  /  AlkPhos  205<H>  02-13    PT/INR - ( 13 Feb 2023 08:18 )   PT: 10.90 sec;   INR: 0.96 ratio         PTT - ( 13 Feb 2023 08:18 )  PTT:25.9 sec      RADIOLOGY & ADDITIONAL TESTS:    Imaging and report Personally Reviewed:  [x ] YES  [ ] NO    < from: [facility] Duplex Lower Ext Vein Scan, Bilat (02.12.23 @ 14:05) >  IMPRESSION:  No evidence of deep venous thrombosis in either lower extremity.      < end of copied text >      < from: US Abdomen Upper Quadrant Right (02.12.23 @ 13:40) >    IMPRESSION:  No sonographic evidence of right upper quadrant abdominal pathology.      < end of copied text >      < from: CT Angio Chest PE Protocol w/ IV Cont (02.12.23 @ 06:58) >  IMPRESSION:      No evidence of pulmonary embolus.    No evidence of acute thoracic pathology.      < end of copied text >      < from: Xray Chest 1 View-PORTABLE IMMEDIATE (02.12.23 @ 05:00) >  Impression:  No evidence of focal consolidation, pleural effusion or pneumothorax.    < end of copied text >      Case discussed with residents and RN on rounds today    Care discussed with pt and family

## 2023-02-14 LAB
ALBUMIN SERPL ELPH-MCNC: 3.8 G/DL — SIGNIFICANT CHANGE UP (ref 3.5–5.2)
ALBUMIN SERPL ELPH-MCNC: 3.8 G/DL — SIGNIFICANT CHANGE UP (ref 3.5–5.2)
ALP SERPL-CCNC: 171 U/L — HIGH (ref 30–115)
ALP SERPL-CCNC: 175 U/L — HIGH (ref 30–115)
ALT FLD-CCNC: 115 U/L — HIGH (ref 0–41)
ALT FLD-CCNC: 117 U/L — HIGH (ref 0–41)
ANION GAP SERPL CALC-SCNC: 11 MMOL/L — SIGNIFICANT CHANGE UP (ref 7–14)
AST SERPL-CCNC: 63 U/L — HIGH (ref 0–41)
AST SERPL-CCNC: 63 U/L — HIGH (ref 0–41)
BASOPHILS # BLD AUTO: 0.11 K/UL — SIGNIFICANT CHANGE UP (ref 0–0.2)
BASOPHILS NFR BLD AUTO: 0.6 % — SIGNIFICANT CHANGE UP (ref 0–1)
BILIRUB DIRECT SERPL-MCNC: <0.2 MG/DL — SIGNIFICANT CHANGE UP (ref 0–0.3)
BILIRUB INDIRECT FLD-MCNC: >0.1 MG/DL — LOW (ref 0.2–1.2)
BILIRUB SERPL-MCNC: 0.3 MG/DL — SIGNIFICANT CHANGE UP (ref 0.2–1.2)
BILIRUB SERPL-MCNC: 0.3 MG/DL — SIGNIFICANT CHANGE UP (ref 0.2–1.2)
BUN SERPL-MCNC: 16 MG/DL — SIGNIFICANT CHANGE UP (ref 10–20)
CALCIUM SERPL-MCNC: 8.7 MG/DL — SIGNIFICANT CHANGE UP (ref 8.4–10.5)
CHLORIDE SERPL-SCNC: 102 MMOL/L — SIGNIFICANT CHANGE UP (ref 98–110)
CO2 SERPL-SCNC: 27 MMOL/L — SIGNIFICANT CHANGE UP (ref 17–32)
CREAT SERPL-MCNC: 0.7 MG/DL — SIGNIFICANT CHANGE UP (ref 0.7–1.5)
EGFR: 97 ML/MIN/1.73M2 — SIGNIFICANT CHANGE UP
EOSINOPHIL # BLD AUTO: 0.01 K/UL — SIGNIFICANT CHANGE UP (ref 0–0.7)
EOSINOPHIL NFR BLD AUTO: 0.1 % — SIGNIFICANT CHANGE UP (ref 0–8)
GLUCOSE SERPL-MCNC: 109 MG/DL — HIGH (ref 70–99)
HCT VFR BLD CALC: 31.9 % — LOW (ref 37–47)
HGB BLD-MCNC: 10.2 G/DL — LOW (ref 12–16)
IMM GRANULOCYTES NFR BLD AUTO: 6.9 % — HIGH (ref 0.1–0.3)
INR BLD: 1.01 RATIO — SIGNIFICANT CHANGE UP (ref 0.65–1.3)
LYMPHOCYTES # BLD AUTO: 1.36 K/UL — SIGNIFICANT CHANGE UP (ref 1.2–3.4)
LYMPHOCYTES # BLD AUTO: 7.4 % — LOW (ref 20.5–51.1)
MAGNESIUM SERPL-MCNC: 2 MG/DL — SIGNIFICANT CHANGE UP (ref 1.8–2.4)
MCHC RBC-ENTMCNC: 29.9 PG — SIGNIFICANT CHANGE UP (ref 27–31)
MCHC RBC-ENTMCNC: 32 G/DL — SIGNIFICANT CHANGE UP (ref 32–37)
MCV RBC AUTO: 93.5 FL — SIGNIFICANT CHANGE UP (ref 81–99)
MONOCYTES # BLD AUTO: 1.31 K/UL — HIGH (ref 0.1–0.6)
MONOCYTES NFR BLD AUTO: 7.1 % — SIGNIFICANT CHANGE UP (ref 1.7–9.3)
NEUTROPHILS # BLD AUTO: 14.3 K/UL — HIGH (ref 1.4–6.5)
NEUTROPHILS NFR BLD AUTO: 77.9 % — HIGH (ref 42.2–75.2)
NRBC # BLD: 0 /100 WBCS — SIGNIFICANT CHANGE UP (ref 0–0)
PLATELET # BLD AUTO: 150 K/UL — SIGNIFICANT CHANGE UP (ref 130–400)
POTASSIUM SERPL-MCNC: 3.7 MMOL/L — SIGNIFICANT CHANGE UP (ref 3.5–5)
POTASSIUM SERPL-SCNC: 3.7 MMOL/L — SIGNIFICANT CHANGE UP (ref 3.5–5)
PROT SERPL-MCNC: 6 G/DL — SIGNIFICANT CHANGE UP (ref 6–8)
PROT SERPL-MCNC: 6.1 G/DL — SIGNIFICANT CHANGE UP (ref 6–8)
PROTHROM AB SERPL-ACNC: 11.5 SEC — SIGNIFICANT CHANGE UP (ref 9.95–12.87)
RBC # BLD: 3.41 M/UL — LOW (ref 4.2–5.4)
RBC # FLD: 14.8 % — HIGH (ref 11.5–14.5)
SODIUM SERPL-SCNC: 140 MMOL/L — SIGNIFICANT CHANGE UP (ref 135–146)
WBC # BLD: 18.35 K/UL — HIGH (ref 4.8–10.8)
WBC # FLD AUTO: 18.35 K/UL — HIGH (ref 4.8–10.8)

## 2023-02-14 PROCEDURE — 99232 SBSQ HOSP IP/OBS MODERATE 35: CPT

## 2023-02-14 RX ORDER — ACETAMINOPHEN 500 MG
650 TABLET ORAL EVERY 6 HOURS
Refills: 0 | Status: DISCONTINUED | OUTPATIENT
Start: 2023-02-14 | End: 2023-02-17

## 2023-02-14 RX ADMIN — Medication 650 MILLIGRAM(S): at 18:30

## 2023-02-14 RX ADMIN — BUDESONIDE AND FORMOTEROL FUMARATE DIHYDRATE 2 PUFF(S): 160; 4.5 AEROSOL RESPIRATORY (INHALATION) at 11:59

## 2023-02-14 RX ADMIN — ENOXAPARIN SODIUM 40 MILLIGRAM(S): 100 INJECTION SUBCUTANEOUS at 17:14

## 2023-02-14 RX ADMIN — Medication 650 MILLIGRAM(S): at 16:53

## 2023-02-14 RX ADMIN — REMDESIVIR 200 MILLIGRAM(S): 5 INJECTION INTRAVENOUS at 15:49

## 2023-02-14 RX ADMIN — PANTOPRAZOLE SODIUM 40 MILLIGRAM(S): 20 TABLET, DELAYED RELEASE ORAL at 05:11

## 2023-02-14 NOTE — CONSULT NOTE ADULT - ASSESSMENT
62-year-old female with breast cancer on chemo, last chemo ( february 1st 2023) presenting today with shortness of breath. Positive for COVID on Thursday 2/9 . Her daughter tested positive on Monday 2/6. She experienced nasal congestion with blood brown mucus. Patient decided to get tested on thursday. She stated that she was fine up until saturday 2/11  when  She endorsed having higher fevers than usual with a tmax of 101.     IMPRESSION;   COVID-19 wth fevers  CXR no GGO  2/12 CT : no PE. No GGO  On RA  Pt is in the early viral replicative phase based on the timeline/onset of symptoms.   S/p vaccination and one booster.   No bacterial PNA  WBC 33.6 > suggestive of a bacterial Process ( unclear if pt was on Neupogen )  2/12 US : contracted GB with wall thickening and GB stone. Clinically no cholecystitis    RECOMMENDATIONS;  Day 1 : Single  mg IV loading dose  Day 2-5 : single  mg IV once daily maintenance dose  Daily CBC,CMP.   No steroids  BCx  No ABx for now  
pt being treated for COVID   no PE OR DVT   cbc shows leukocytosis  ,most likely sec to neupogen   as pt got it next day after chemo ,  we will hold further chemo at this time as has active covid       will follow with you    freddie jauregui MD

## 2023-02-14 NOTE — CONSULT NOTE ADULT - SUBJECTIVE AND OBJECTIVE BOX
pt admitted for sob   found to have COVID  and fever   known breast cancer pt on chemo 
  JOHNY RODRIGUEZ  62y, Female  Allergy: Apple Juice (Unknown)  IV Contrast (Other)  penicillins (Unknown)      All historical available data reviewed.    HPI:      62-year-old female with history of asthma (never intubated no ICU admissions), hyperthyroidism, breast cancer on chemo, last chemo ( 2023) presenting today with shortness of breath.  Patient states that since her second round of chemo, she has had daily fevers that her hematologist/oncologist told her were normal.  However, patient tested positive for COVID on Thursday. Her daughter tested positive on Monday. She moved out of the house and was without symptoms until Thursday. She experienced nasal congestion with blood brown mucus. Patient decided to get tested on thursday. She stated that she was fine up until saturday when  She endorsed having higher fevers than usual with a tmax of 101. She reached out to her doctor who told her to walk around as see how she does. She subsequently became short of breath on exertion and feeling like she could not breath similar to her asthma exacerbations. She tried her asthma inhalers  without improvement of symptoms prompting ED visit.  Denies any chest pain.  Denies any  abdominal pain vomiting or diarrhea.  Does state that the left ankle slightly more edematous than usual however denies any calf pain, or trauma. Heme-onc Dr Kaiser. Patient is vaccinated x3 with moderna last dose     In the ED, vitals were /64, , RR 22, T 99.7, SpO2 97% on RA. EKG showed . CTA showed No evidence of pulmonary embolus. No evidence of acute thoracic pathology. Labs were significant for WBC 33K, Hgb 10.6, Alk phos 247, AST 47, , VBG pCO2 55, Covid positive. Patient received 2L LR bolus as well as solumedrol 125 and benadryl in the ED.     Patient is being admitted for further monitoring and management      (2023 11:21)    FAMILY HISTORY:  No pertinent family history (Father, Mother)      PAST MEDICAL & SURGICAL HISTORY:  Diverticulitis      Asthma  last use albuterol 2022-well controlled      Hyperthyroidism      Hiatal hernia      MRSA infection  right lung-       Sleep apnea  mild with dental device, no cpap machine      IBS (irritable bowel syndrome)       novel coronavirus disease (COVID-19)  2022-prednisone RX      History of left bundle branch block (LBBB)      H/O exploratory laparotomy  at age 15-appendectomy, ovarian cystectomy      H/O colonoscopy  x3      H/O endoscopy  x2            VITALS:  T(F): 98.2, Max: 98.9 (23 @ 04:05)  HR: 83  BP: 121/62  RR: 18Vital Signs Last 24 Hrs  T(C): 36.8 (2023 14:38), Max: 37.2 (2023 04:05)  T(F): 98.2 (2023 14:38), Max: 98.9 (2023 04:05)  HR: 83 (2023 14:38) (83 - 99)  BP: 121/62 (2023 14:38) (111/65 - 121/62)  BP(mean): --  RR: 18 (2023 14:38) (17 - 18)  SpO2: 97% (2023 14:38) (97% - 97%)    Parameters below as of 2023 15:48  Patient On (Oxygen Delivery Method): room air        TESTS & MEASUREMENTS:                        9.6    33.18 )-----------( 157      ( 2023 08:18 )             29.3     02-13    140  |  102  |  12  ----------------------------<  93  4.5   |  24  |  0.6<L>    Ca    9.0      2023 08:18  Mg     2.4     02-13    TPro  6.3  /  Alb  3.9  /  TBili  <0.2  /  DBili  <0.2  /  AST  45<H>  /  ALT  97<H>  /  AlkPhos  205<H>  02-13    LIVER FUNCTIONS - ( 2023 08:18 )  Alb: 3.9 g/dL / Pro: 6.3 g/dL / ALK PHOS: 205 U/L / ALT: 97 U/L / AST: 45 U/L / GGT: x             Urinalysis Basic - ( 2023 09:10 )    Color: Colorless / Appearance: Clear / S.018 / pH: x  Gluc: x / Ketone: Negative  / Bili: Negative / Urobili: <2 mg/dL   Blood: x / Protein: Negative / Nitrite: Negative   Leuk Esterase: Negative / RBC: x / WBC x   Sq Epi: x / Non Sq Epi: x / Bacteria: x          RADIOLOGY & ADDITIONAL TESTS:  Personal review of radiological diagnostics performed  Echo and EKG results noted when applicable.     MEDICATIONS:  albuterol/ipratropium for Nebulization 3 milliLiter(s) Nebulizer every 6 hours  aluminum hydroxide/magnesium hydroxide/simethicone Suspension 30 milliLiter(s) Oral every 4 hours PRN  budesonide 160 MICROgram(s)/formoterol 4.5 MICROgram(s) Inhaler 2 Puff(s) Inhalation two times a day  diphenhydrAMINE Injectable 25 milliGRAM(s) IV Push once PRN  enoxaparin Injectable 40 milliGRAM(s) SubCutaneous every 24 hours  famotidine    Tablet 40 milliGRAM(s) Oral at bedtime PRN  melatonin 3 milliGRAM(s) Oral at bedtime PRN  methimazole 5 milliGRAM(s) Oral daily  ondansetron Injectable 4 milliGRAM(s) IV Push every 8 hours PRN  pantoprazole    Tablet 40 milliGRAM(s) Oral before breakfast      ANTIBIOTICS:

## 2023-02-14 NOTE — PROGRESS NOTE ADULT - SUBJECTIVE AND OBJECTIVE BOX
JOHNY RODRIGUEZ  62y Female    INTERVAL HPI/OVERNIGHT EVENTS:    pt with some nasal congestion and cough  no fever, pain, SOB, N/V, other complaints  had BM    T(F): 97.9 (02-14-23 @ 04:56), Max: 98.4 (02-13-23 @ 19:29)  HR: 72 (02-14-23 @ 04:56) (72 - 85)  BP: 119/56 (02-14-23 @ 04:56) (119/56 - 121/62)  RR: 18 (02-14-23 @ 04:56) (18 - 18)  SpO2: 98% (02-14-23 @ 04:56) (97% - 98%) on RA      PHYSICAL EXAM:  GENERAL: NAD  HEAD:  Normocephalic  EYES:  conjunctiva and sclera clear  ENMT: Moist mucous membranes  NERVOUS SYSTEM:  Alert, awake, Good concentration  CHEST/LUNG: CTA b/l; No rales, rhonchi, wheezing  HEART: Regular rate and rhythm  ABDOMEN: Soft, Nontender, Nondistended; Bowel sounds present  EXTREMITIES:   No edema  SKIN: warm, dry    Consultant(s) Notes Reviewed:  [x ] YES  [ ] NO  Care Discussed with Consultants/Other Providers [ x] YES  [ ] NO    MEDICATIONS  (STANDING):  albuterol/ipratropium for Nebulization 3 milliLiter(s) Nebulizer every 6 hours  budesonide 160 MICROgram(s)/formoterol 4.5 MICROgram(s) Inhaler 2 Puff(s) Inhalation two times a day  enoxaparin Injectable 40 milliGRAM(s) SubCutaneous every 24 hours  methimazole 5 milliGRAM(s) Oral daily  pantoprazole    Tablet 40 milliGRAM(s) Oral before breakfast  remdesivir  IVPB 100 milliGRAM(s) IV Intermittent every 24 hours    MEDICATIONS  (PRN):  aluminum hydroxide/magnesium hydroxide/simethicone Suspension 30 milliLiter(s) Oral every 4 hours PRN Dyspepsia  diphenhydrAMINE Injectable 25 milliGRAM(s) IV Push once PRN Itching  famotidine    Tablet 40 milliGRAM(s) Oral at bedtime PRN reflux  melatonin 3 milliGRAM(s) Oral at bedtime PRN Insomnia  ondansetron Injectable 4 milliGRAM(s) IV Push every 8 hours PRN Nausea and/or Vomiting      LABS:                        10.2   18.35 )-----------( 150      ( 14 Feb 2023 08:59 )             31.9     02-14    140  |  102  |  16  ----------------------------<  109<H>  3.7   |  27  |  0.7    Ca    8.7      14 Feb 2023 08:59  Mg     2.0     02-14    TPro  6.0  /  Alb  3.8  /  TBili  0.3  /  DBili  <0.2  /  AST  63<H>  /  ALT  117<H>  /  AlkPhos  171<H>  02-14    PT/INR - ( 14 Feb 2023 08:59 )   PT: 11.50 sec;   INR: 1.01 ratio         PTT - ( 13 Feb 2023 08:18 )  PTT:25.9 sec    Culture - Urine (collected 12 Feb 2023 09:10)  Source: Clean Catch Clean Catch (Midstream)  Final Report (13 Feb 2023 15:29):    <10,000 CFU/mL Normal Urogenital Leena    Culture - Blood (collected 12 Feb 2023 05:41)  Source: .Blood Blood-Peripheral  Preliminary Report (13 Feb 2023 17:01):    No growth to date.    Culture - Blood (collected 12 Feb 2023 05:41)  Source: .Blood Blood-Peripheral  Preliminary Report (13 Feb 2023 17:01):    No growth to date.        SARS-CoV-2: Detected (12 Feb 2023 18:23)  COVID-19 PCR: Detected (12 Feb 2023 05:41)    Ferritin, Serum: 1110 ng/mL (02-13-23 @ 08:18)  Ferritin, Serum: 1111 ng/mL (02-12-23 @ 18:10)    C-Reactive Protein, Serum: 24.6 mg/L (02-12-23 @ 18:10)  C-Reactive Protein, Serum: 19.4 mg/L (02-13-23 @ 08:18)    D-Dimer Assay, Quantitative: 216 ng/mL DDU (02-13-23 @ 08:18)  D-Dimer Assay, Quantitative: 229 ng/mL DDU (02-12-23 @ 18:10)    Procalcitonin, Serum: 0.12 ng/mL (02-13-23 @ 08:18)            Case discussed with residents and RN on rounds today    Care discussed with pt

## 2023-02-14 NOTE — PROGRESS NOTE ADULT - SUBJECTIVE AND OBJECTIVE BOX
JOHNY RODRIGUEZ 62y Female  MRN#: [id number]     Hospital Day: 2d    Pt is currently admitted with the primary diagnosis of Covid/Asthma exacerbation    SUBJECTIVE    Patient seen and examined at bedside, resting comfortably in NAD. SOB improved. Patient reports fatigue but no other complaints.                                           ----------------------------------------------------------  OBJECTIVE  PAST MEDICAL & SURGICAL HISTORY  Diverticulitis    Asthma  last use albuterol 9/2022-well controlled    Hyperthyroidism    Hiatal hernia    MRSA infection  right lung- 2010    Sleep apnea  mild with dental device, no cpap machine    IBS (irritable bowel syndrome)    2019 novel coronavirus disease (COVID-19)  6/2022-prednisone RX    History of left bundle branch block (LBBB)    H/O exploratory laparotomy  at age 15-appendectomy, ovarian cystectomy    H/O colonoscopy  x3    H/O endoscopy  x2                                              -----------------------------------------------------------  ALLERGIES:  Apple Juice (Unknown)  IV Contrast (Other)  penicillins (Unknown)                                            ------------------------------------------------------------    HOME MEDICATIONS  Home Medications:  Advair Diskus:  (12 Feb 2023 12:33)  methIMAzole 5 mg oral tablet:  5 times /week (12 Feb 2023 12:33)  NexIUM 40 mg oral delayed release capsule: 1 cap(s) orally once a day (12 Feb 2023 12:33)  Pepcid 40 mg oral tablet: 1 tab(s) orally once a day (at bedtime), As Needed (12 Feb 2023 12:33)                           MEDICATIONS:  STANDING MEDICATIONS  albuterol/ipratropium for Nebulization 3 milliLiter(s) Nebulizer every 6 hours  budesonide 160 MICROgram(s)/formoterol 4.5 MICROgram(s) Inhaler 2 Puff(s) Inhalation two times a day  enoxaparin Injectable 40 milliGRAM(s) SubCutaneous every 24 hours  methimazole 5 milliGRAM(s) Oral daily  pantoprazole    Tablet 40 milliGRAM(s) Oral before breakfast  remdesivir  IVPB 200 milliGRAM(s) IV Intermittent once    PRN MEDICATIONS  aluminum hydroxide/magnesium hydroxide/simethicone Suspension 30 milliLiter(s) Oral every 4 hours PRN  diphenhydrAMINE Injectable 25 milliGRAM(s) IV Push once PRN  famotidine    Tablet 40 milliGRAM(s) Oral at bedtime PRN  melatonin 3 milliGRAM(s) Oral at bedtime PRN  ondansetron Injectable 4 milliGRAM(s) IV Push every 8 hours PRN                                            ------------------------------------------------------------  VITAL SIGNS: Last 24 Hours  Vital Signs Last 24 Hrs  T(C): 36.6 (14 Feb 2023 04:56), Max: 36.9 (13 Feb 2023 19:29)  T(F): 97.9 (14 Feb 2023 04:56), Max: 98.4 (13 Feb 2023 19:29)  HR: 72 (14 Feb 2023 04:56) (72 - 85)  BP: 119/56 (14 Feb 2023 04:56) (119/56 - 121/62)  BP(mean): --  RR: 18 (14 Feb 2023 04:56) (18 - 18)  SpO2: 98% (14 Feb 2023 04:56) (97% - 98%)    Parameters below as of 14 Feb 2023 04:56  Patient On (Oxygen Delivery Method): room air                                           --------------------------------------------------------------  LABS:               LABS:                        10.2   18.35 )-----------( 150      ( 14 Feb 2023 08:59 )             31.9     02-13    x   |  x   |  x   ----------------------------<  x   x    |  x   |  0.7    Ca    9.0      13 Feb 2023 08:18  Mg     2.4     02-13    TPro  6.1  /  Alb  4.1  /  TBili  <0.2  /  DBili  <0.2  /  AST  67<H>  /  ALT  111<H>  /  AlkPhos  182<H>  02-13    PT/INR - ( 14 Feb 2023 08:59 )   PT: 11.50 sec;   INR: 1.01 ratio         PTT - ( 13 Feb 2023 08:18 )  PTT:25.9 sec        CARDIAC MARKERS ( 12 Feb 2023 05:41 )  x     / <0.01 ng/mL / x     / x     / x                                                  -------------------------------------------------------------  RADIOLOGY:                                            --------------------------------------------------------------    PHYSICAL EXAM:  CONSTITUTIONAL: Well-developed; well-nourished; in no acute distress.  SKIN: Skin exam is warm and dry, no acute rash.  HEAD: Normocephalic; atraumatic.  EYES: Pupils  reactive to light, Extraocular movements intact; conjunctiva and sclera clear.  ENT: No nasal discharge; airway clear. Moist mucus membranes.  NECK: Supple; non tender. No rigidity  CARD: Regular rate and rhythm. Normal S1, S2; no murmurs, gallops, or rubs.  RESP: CTAB. No wheezes, rales or rhonchi.  ABD: Abdomen soft; non-tender; non-distended  EXT: Normal ROM. No clubbing, cyanosis or edema.   NEURO: Alert and oriented x 3. No focal deficits.  PSYCH: cooperative, appropriate.                                            --------------------------------------------------------------    ASSESSMENT & PLAN    62-year-old female with history of asthma (never intubated no ICU admissions), hyperthyroidism, breast cancer on chemo, last chemo (february 1st 2023) presenting today with shortness of breath.      # COVID 19 infection - mild illness  # ? asthma exacerbation, SOB improved  - symptom onset: 2/9, exposed 2/6  - SpO2 97% on RA   - CXR: no acute cardiopulmonary disease, no focal consolidation   - CTA chest: No evidence of pulmonary embolus. No evidence of acute thoracic pathology.   - s/p 1x dose of empiric ABx Cefepime and Vancomycin   - blood and urine cultures negative   - c/w Duonebs q4 and PRN and Budesonide/formoterol 2 puffs BID   - CTAB, no wheezing > no steroids   - D-dimer 216, CRP 25, pro-marty 0.12, ferritin 1110  - RDV x5 days (today is day 2/5)    # Leukocytosis  - s/p Neupogen on 2/1/23  - 30>18l, monitor    # Transaminitis  - >205>182  - AST 48>45>67  - >97>111  - bili wnl  - trend LFTs, PT/INR  - RUQ sono: negative     # Normocytic Anemia   - denies Melena, bloody BM, coffee ground emesis  - Baseline Hgb ~12.5 > 10.6 > 9.6  - trend CBC, keep active T&S    # Breast Cancer  - on chemotherapy (Taxotene and Cytocan); finished second round february 1st 2023  - follows Dr Herrera  - o/p follow up     # Hyperthyroidism   - c/w Methimazole 5mg qd    #DVT PPX : Lovenox  #Diet: Regular  #GI PPX: n/a  #Activity: AAT  #Code: full

## 2023-02-15 LAB
ALBUMIN SERPL ELPH-MCNC: 3.7 G/DL — SIGNIFICANT CHANGE UP (ref 3.5–5.2)
ALBUMIN SERPL ELPH-MCNC: 3.8 G/DL — SIGNIFICANT CHANGE UP (ref 3.5–5.2)
ALP SERPL-CCNC: 160 U/L — HIGH (ref 30–115)
ALP SERPL-CCNC: 164 U/L — HIGH (ref 30–115)
ALT FLD-CCNC: 93 U/L — HIGH (ref 0–41)
ALT FLD-CCNC: 93 U/L — HIGH (ref 0–41)
ANION GAP SERPL CALC-SCNC: 9 MMOL/L — SIGNIFICANT CHANGE UP (ref 7–14)
AST SERPL-CCNC: 36 U/L — SIGNIFICANT CHANGE UP (ref 0–41)
AST SERPL-CCNC: 37 U/L — SIGNIFICANT CHANGE UP (ref 0–41)
BASOPHILS # BLD AUTO: 0.12 K/UL — SIGNIFICANT CHANGE UP (ref 0–0.2)
BASOPHILS NFR BLD AUTO: 0.8 % — SIGNIFICANT CHANGE UP (ref 0–1)
BILIRUB DIRECT SERPL-MCNC: <0.2 MG/DL — SIGNIFICANT CHANGE UP (ref 0–0.3)
BILIRUB INDIRECT FLD-MCNC: >0 MG/DL — LOW (ref 0.2–1.2)
BILIRUB SERPL-MCNC: 0.2 MG/DL — SIGNIFICANT CHANGE UP (ref 0.2–1.2)
BILIRUB SERPL-MCNC: 0.2 MG/DL — SIGNIFICANT CHANGE UP (ref 0.2–1.2)
BUN SERPL-MCNC: 15 MG/DL — SIGNIFICANT CHANGE UP (ref 10–20)
CALCIUM SERPL-MCNC: 8.8 MG/DL — SIGNIFICANT CHANGE UP (ref 8.4–10.5)
CHLORIDE SERPL-SCNC: 100 MMOL/L — SIGNIFICANT CHANGE UP (ref 98–110)
CO2 SERPL-SCNC: 28 MMOL/L — SIGNIFICANT CHANGE UP (ref 17–32)
CREAT SERPL-MCNC: 0.7 MG/DL — SIGNIFICANT CHANGE UP (ref 0.7–1.5)
EGFR: 97 ML/MIN/1.73M2 — SIGNIFICANT CHANGE UP
EOSINOPHIL # BLD AUTO: 0.04 K/UL — SIGNIFICANT CHANGE UP (ref 0–0.7)
EOSINOPHIL NFR BLD AUTO: 0.3 % — SIGNIFICANT CHANGE UP (ref 0–8)
GLUCOSE SERPL-MCNC: 79 MG/DL — SIGNIFICANT CHANGE UP (ref 70–99)
HCT VFR BLD CALC: 32.2 % — LOW (ref 37–47)
HGB BLD-MCNC: 10.3 G/DL — LOW (ref 12–16)
IMM GRANULOCYTES NFR BLD AUTO: 5.6 % — HIGH (ref 0.1–0.3)
INR BLD: 0.98 RATIO — SIGNIFICANT CHANGE UP (ref 0.65–1.3)
LYMPHOCYTES # BLD AUTO: 1 K/UL — LOW (ref 1.2–3.4)
LYMPHOCYTES # BLD AUTO: 6.4 % — LOW (ref 20.5–51.1)
MAGNESIUM SERPL-MCNC: 2.2 MG/DL — SIGNIFICANT CHANGE UP (ref 1.8–2.4)
MCHC RBC-ENTMCNC: 29.6 PG — SIGNIFICANT CHANGE UP (ref 27–31)
MCHC RBC-ENTMCNC: 32 G/DL — SIGNIFICANT CHANGE UP (ref 32–37)
MCV RBC AUTO: 92.5 FL — SIGNIFICANT CHANGE UP (ref 81–99)
MONOCYTES # BLD AUTO: 1.31 K/UL — HIGH (ref 0.1–0.6)
MONOCYTES NFR BLD AUTO: 8.4 % — SIGNIFICANT CHANGE UP (ref 1.7–9.3)
NEUTROPHILS # BLD AUTO: 12.33 K/UL — HIGH (ref 1.4–6.5)
NEUTROPHILS NFR BLD AUTO: 78.5 % — HIGH (ref 42.2–75.2)
NRBC # BLD: 0 /100 WBCS — SIGNIFICANT CHANGE UP (ref 0–0)
PLATELET # BLD AUTO: 158 K/UL — SIGNIFICANT CHANGE UP (ref 130–400)
POTASSIUM SERPL-MCNC: 4.2 MMOL/L — SIGNIFICANT CHANGE UP (ref 3.5–5)
POTASSIUM SERPL-SCNC: 4.2 MMOL/L — SIGNIFICANT CHANGE UP (ref 3.5–5)
PROT SERPL-MCNC: 6 G/DL — SIGNIFICANT CHANGE UP (ref 6–8)
PROT SERPL-MCNC: 6.1 G/DL — SIGNIFICANT CHANGE UP (ref 6–8)
PROTHROM AB SERPL-ACNC: 11.2 SEC — SIGNIFICANT CHANGE UP (ref 9.95–12.87)
RBC # BLD: 3.48 M/UL — LOW (ref 4.2–5.4)
RBC # FLD: 14.6 % — HIGH (ref 11.5–14.5)
SODIUM SERPL-SCNC: 137 MMOL/L — SIGNIFICANT CHANGE UP (ref 135–146)
WBC # BLD: 15.68 K/UL — HIGH (ref 4.8–10.8)
WBC # FLD AUTO: 15.68 K/UL — HIGH (ref 4.8–10.8)

## 2023-02-15 PROCEDURE — 99232 SBSQ HOSP IP/OBS MODERATE 35: CPT

## 2023-02-15 RX ADMIN — ENOXAPARIN SODIUM 40 MILLIGRAM(S): 100 INJECTION SUBCUTANEOUS at 17:44

## 2023-02-15 RX ADMIN — Medication 30 MILLILITER(S): at 06:34

## 2023-02-15 RX ADMIN — REMDESIVIR 200 MILLIGRAM(S): 5 INJECTION INTRAVENOUS at 15:01

## 2023-02-15 RX ADMIN — PANTOPRAZOLE SODIUM 40 MILLIGRAM(S): 20 TABLET, DELAYED RELEASE ORAL at 06:34

## 2023-02-15 RX ADMIN — BUDESONIDE AND FORMOTEROL FUMARATE DIHYDRATE 2 PUFF(S): 160; 4.5 AEROSOL RESPIRATORY (INHALATION) at 12:25

## 2023-02-15 NOTE — PROGRESS NOTE ADULT - ASSESSMENT
61 y/o woman with history of asthma (never intubated and no ICU admissions), hyperthyroidism, breast cancer on chemotherapy with Dr. Pappas (last chemo on 2/1/23 and s/p eflapegrastim on 2/2/23) presented with shortness of breath, fever to 101 and + COVID test on 2/9/23.  Patient is vaccinated x3 with moderna last dose 12/21.    1. SIRS present on admission  COVID positive since 2/9 - isolation per protocol  leukocytosis of 33K (possibly due to G-CSF vs other infection)  ID consult in progress  pt agreeable for remdesivir x 3 days  pulse ox monitoring - stable on RA today  CTA of chest: no PE or consolidation  ddimer 216, ferritin 1111, CRP 24.6  f/u blood cultures  venous duplex negative for DVT  UA negative  transaminitis improving - RUQ US - no evidence of pathology  trend WBC count and monitor temps  monitor off abx for now    2. Shortness of breath  ?due to asthma  no PE, infiltrate, effusion on CTA  no wheezing at this time  agree with symbicort and nebs and monitor  peak flow daily    3. Breast cancer on chemotherapy  inform Dr. Pappas of pt's admission  due for next cycle later this month    4. Hyperthyroid on methimazole    5. DVT prophylaxis on lovenox    full code        PROGRESS NOTE HANDOFF    Pending: blood cultures, ID consult, labs in AM, pulse ox monitoring    Family discussion: with pt and  at bedside today    Disposition: home  
63 y/o woman with history of asthma (never intubated and no ICU admissions), hyperthyroidism, breast cancer on chemotherapy with Dr. Pappas (last chemo on 2/1/23 and s/p eflapegrastim on 2/2/23) presented with shortness of breath, fever to 101 and + COVID test on 2/9/23.  Patient is vaccinated x3 with moderna last dose 12/21.    1. SIRS present on admission  COVID positive since 2/9 - isolation per protocol  leukocytosis of 33K now trended down to 18K (possibly due to G-CSF)  ID consult appreciated  pt agreeable for remdesivir x 5 day recommended course  pulse ox monitoring - stable on RA today  CTA of chest: no PE or consolidation  ddimer 216, ferritin 1111, CRP 24.6, procal 0.12  blood cultures negative  venous duplex negative for DVT  UA negative  transaminitis improving and now stable - RUQ US - no evidence of pathology  trend WBC count and monitor temps  monitor off abx for now    2. Shortness of breath  due to asthma vs COVID  no PE, infiltrate, effusion on CTA  no wheezing at this time  agree with symbicort and nebs and monitor  peak flow daily    3. Breast cancer on chemotherapy  inform Dr. Pappas of pt's admission  due for next cycle on Friday    4. Hyperthyroid on methimazole    5. DVT prophylaxis on lovenox        full code        PROGRESS NOTE HANDOFF    Pending: continue remdesivir, monitor temps and trend WBC/LFTs, HemOnc eval (pt due to chemotherapy on Friday)    Family discussion: with pt today    Disposition: home  
COVID FEVER getting better   known to us for breast cancer s/p 2 rounds of chemo after surgery   wbc coming down was v high at admission   plt and hb stable     chemo on hold for now   will follow     freddie jauregui MD   
63 y/o woman with history of asthma (never intubated and no ICU admissions), hyperthyroidism, breast cancer on chemotherapy with Dr. Pappas (last chemo on 2/1/23 and s/p eflapegrastim on 2/2/23) presented with shortness of breath, fever to 101 and + COVID test on 2/9/23.  Patient is vaccinated x3 with moderna last dose 12/21.    1. Sepsis present on admission due to COVID 19 infection  COVID positive since 2/9 - isolation per protocol  leukocytosis of 33K now trended down to 15K (likely due to G-CSF)  ID f/u appreciated  pt agreeable for remdesivir x 5 day recommended course (last day on 2/17)  pulse ox monitoring - stable on RA today  CTA of chest: no PE or consolidation  ddimer 216, ferritin 1111, CRP 24.6, procal 0.12  blood cultures negative  venous duplex negative for DVT  UA negative  transaminitis improving - RUQ US - no evidence of pathology  trend WBC count and monitor temps  monitor off abx for now    2. Shortness of breath  due to asthma vs COVID  no PE, infiltrate, effusion on CTA  no wheezing at this time  agree with symbicort and nebs and monitor  peak flow daily    3. Breast cancer on chemotherapy  HemOn Dr Guan appreciated: chemo on hold for now -> outpt f/u with Dr. Pappas    4. Hyperthyroid on methimazole    5. DVT prophylaxis on lovenox        full code        PROGRESS NOTE HANDOFF    Pending: continue remdesivir, monitor temps and trend WBC/LFTs    Family discussion: with pt today    Disposition: home on 2/17 after last dose of remdesivir  
· Assessment	   62-year-old female with breast cancer on chemo, last chemo ( february 1st 2023) presenting today with shortness of breath. Positive for COVID on Thursday 2/9 . Her daughter tested positive on Monday 2/6. She experienced nasal congestion with blood brown mucus. Patient decided to get tested on thursday. She stated that she was fine up until saturday 2/11  when  She endorsed having higher fevers than usual with a tmax of 101.     IMPRESSION;   COVID-19 wth resolved fevers  CXR no GGO  2/12 CT : no PE. No GGO  On RA  Pt is in the early viral replicative phase based on the timeline/onset of symptoms.   S/p vaccination and one booster.   No cytokine response  Ferritin 1110  CRP 19.4  Procal 0.12  ddimers 229  No bacterial PNA  WBC 33.6 > 15.6 ( s/p Rolvedon )  2/12 US : contracted GB with wall thickening and GB stone. Clinically no cholecystitis  2/12 BCx NG  2/12 UCx NG  Cheo GOTTLIEB    RECOMMENDATIONS;  Day 1 : Single  mg IV loading dose  Day 2-5 : single  mg IV once daily maintenance dose  Daily CBC,CMP.   No steroids  Please do not hesitate to recall ID if any questions arise either through [employer] or through [employer]

## 2023-02-15 NOTE — PROGRESS NOTE ADULT - SUBJECTIVE AND OBJECTIVE BOX
JOHNY RODRIGUEZ  62y, Female    All available historical data reviewed    OVERNIGHT EVENTS:  no fevers  feels well and has no new complaints  on RA    ROS:  General: Denies rigors, nightsweats  HEENT: Denies headache, rhinorrhea, sore throat, eye pain  CV: Denies CP, palpitations  PULM: Denies wheezing, hemoptysis  GI: Denies hematemesis, hematochezia, melena  : Denies discharge, hematuria  MSK: Denies arthralgias, myalgias  SKIN: Denies rash, lesions  NEURO: Denies paresthesias, weakness  PSYCH: Denies depression, anxiety    VITALS:  T(F): 97.5, Max: 98.1 (02-14-23 @ 13:38)  HR: 86  BP: 112/47  RR: 18Vital Signs Last 24 Hrs  T(C): 36.4 (15 Feb 2023 05:06), Max: 36.7 (14 Feb 2023 13:38)  T(F): 97.5 (15 Feb 2023 05:06), Max: 98.1 (14 Feb 2023 13:38)  HR: 86 (15 Feb 2023 05:06) (80 - 86)  BP: 112/47 (15 Feb 2023 05:06) (112/47 - 120/60)  BP(mean): --  RR: 18 (15 Feb 2023 05:06) (18 - 18)  SpO2: --        TESTS & MEASUREMENTS:                        10.3   15.68 )-----------( 158      ( 15 Feb 2023 06:56 )             32.2     02-15    137  |  100  |  15  ----------------------------<  79  4.2   |  28  |  0.7    Ca    8.8      15 Feb 2023 06:56  Mg     2.2     02-15    TPro  6.0  /  Alb  3.7  /  TBili  0.2  /  DBili  <0.2  /  AST  36  /  ALT  93<H>  /  AlkPhos  160<H>  02-15    LIVER FUNCTIONS - ( 15 Feb 2023 06:56 )  Alb: 3.7 g/dL / Pro: 6.0 g/dL / ALK PHOS: 160 U/L / ALT: 93 U/L / AST: 36 U/L / GGT: x             Culture - Urine (collected 02-12-23 @ 09:10)  Source: Clean Catch Clean Catch (Midstream)  Final Report (02-13-23 @ 15:29):    <10,000 CFU/mL Normal Urogenital Leena    Culture - Blood (collected 02-12-23 @ 05:41)  Source: .Blood Blood-Peripheral  Preliminary Report (02-13-23 @ 17:01):    No growth to date.    Culture - Blood (collected 02-12-23 @ 05:41)  Source: .Blood Blood-Peripheral  Preliminary Report (02-13-23 @ 17:01):    No growth to date.            RADIOLOGY & ADDITIONAL TESTS:  Personal review of radiological diagnostics performed  Echo and EKG results noted when applicable.     MEDICATIONS:  acetaminophen     Tablet .. 650 milliGRAM(s) Oral every 6 hours PRN  albuterol/ipratropium for Nebulization 3 milliLiter(s) Nebulizer every 6 hours  aluminum hydroxide/magnesium hydroxide/simethicone Suspension 30 milliLiter(s) Oral every 4 hours PRN  budesonide 160 MICROgram(s)/formoterol 4.5 MICROgram(s) Inhaler 2 Puff(s) Inhalation two times a day  diphenhydrAMINE Injectable 25 milliGRAM(s) IV Push once PRN  enoxaparin Injectable 40 milliGRAM(s) SubCutaneous every 24 hours  famotidine    Tablet 40 milliGRAM(s) Oral at bedtime PRN  melatonin 3 milliGRAM(s) Oral at bedtime PRN  methimazole 5 milliGRAM(s) Oral daily  ondansetron Injectable 4 milliGRAM(s) IV Push every 8 hours PRN  pantoprazole    Tablet 40 milliGRAM(s) Oral before breakfast  remdesivir  IVPB 100 milliGRAM(s) IV Intermittent every 24 hours      ANTIBIOTICS:  remdesivir  IVPB 100 milliGRAM(s) IV Intermittent every 24 hours

## 2023-02-15 NOTE — PROGRESS NOTE ADULT - SUBJECTIVE AND OBJECTIVE BOX
JOHNY RODRIGUEZ  62y Female    INTERVAL HPI/OVERNIGHT EVENTS:    pt with nasal congestion and cough but otherwise feels OK  no fever, pain, SOB, N/V    T(F): 97.5 (02-15-23 @ 05:06), Max: 98.1 (02-14-23 @ 13:38)  HR: 86 (02-15-23 @ 05:06) (80 - 86)  BP: 112/47 (02-15-23 @ 05:06) (112/47 - 120/60)  RR: 18 (02-15-23 @ 05:06) (18 - 18)    PHYSICAL EXAM:  GENERAL: NAD  HEAD:  Normocephalic  EYES:  conjunctiva and sclera clear  ENMT: Moist mucous membranes  NERVOUS SYSTEM:  Alert, awake, Good concentration  CHEST/LUNG: CTA b/l; No rales, rhonchi, wheezing  HEART: Regular rate and rhythm  ABDOMEN: Soft, Nontender, Nondistended; Bowel sounds present  EXTREMITIES:   No edema  SKIN: warm, dry    Consultant(s) Notes Reviewed:  [x ] YES  [ ] NO  Care Discussed with Consultants/Other Providers [ x] YES  [ ] NO    MEDICATIONS  (STANDING):  albuterol/ipratropium for Nebulization 3 milliLiter(s) Nebulizer every 6 hours  budesonide 160 MICROgram(s)/formoterol 4.5 MICROgram(s) Inhaler 2 Puff(s) Inhalation two times a day  enoxaparin Injectable 40 milliGRAM(s) SubCutaneous every 24 hours  methimazole 5 milliGRAM(s) Oral daily  pantoprazole    Tablet 40 milliGRAM(s) Oral before breakfast  remdesivir  IVPB 100 milliGRAM(s) IV Intermittent every 24 hours    MEDICATIONS  (PRN):  acetaminophen     Tablet .. 650 milliGRAM(s) Oral every 6 hours PRN Temp greater or equal to 38C (100.4F), Mild Pain (1 - 3)  aluminum hydroxide/magnesium hydroxide/simethicone Suspension 30 milliLiter(s) Oral every 4 hours PRN Dyspepsia  diphenhydrAMINE Injectable 25 milliGRAM(s) IV Push once PRN Itching  famotidine    Tablet 40 milliGRAM(s) Oral at bedtime PRN reflux  melatonin 3 milliGRAM(s) Oral at bedtime PRN Insomnia  ondansetron Injectable 4 milliGRAM(s) IV Push every 8 hours PRN Nausea and/or Vomiting      LABS:                        10.3   15.68 )-----------( 158      ( 15 Feb 2023 06:56 )             32.2     02-15    137  |  100  |  15  ----------------------------<  79  4.2   |  28  |  0.7    Ca    8.8      15 Feb 2023 06:56  Mg     2.2     02-15    TPro  6.0  /  Alb  3.7  /  TBili  0.2  /  DBili  <0.2  /  AST  36  /  ALT  93<H>  /  AlkPhos  160<H>  02-15    PT/INR - ( 15 Feb 2023 06:56 )   PT: 11.20 sec;   INR: 0.98 ratio               SARS-CoV-2: Detected (12 Feb 2023 18:23)  COVID-19 PCR: Detected (12 Feb 2023 05:41)    Ferritin, Serum: 1110 ng/mL (02-13-23 @ 08:18)  Ferritin, Serum: 1111 ng/mL (02-12-23 @ 18:10)    C-Reactive Protein, Serum: 24.6 mg/L (02-12-23 @ 18:10)  C-Reactive Protein, Serum: 19.4 mg/L (02-13-23 @ 08:18)    D-Dimer Assay, Quantitative: 216 ng/mL DDU (02-13-23 @ 08:18)  D-Dimer Assay, Quantitative: 229 ng/mL DDU (02-12-23 @ 18:10)    Procalcitonin, Serum: 0.12 ng/mL (02-13-23 @ 08:18)            Case discussed with residents and RN on rounds today    Care discussed with pt

## 2023-02-15 NOTE — PROGRESS NOTE ADULT - SUBJECTIVE AND OBJECTIVE BOX
JOHNY RODRIGUEZ 62y Female  MRN#: [id number]     Hospital Day: 3d    Pt is currently admitted with the primary diagnosis of Covid/Asthma exacerbation    SUBJECTIVE    Patient seen and examined at bedside, resting comfortably in NAD. SOB improved. Patient reports fatigue but no other complaints.                                           ----------------------------------------------------------  OBJECTIVE  PAST MEDICAL & SURGICAL HISTORY  Diverticulitis    Asthma  last use albuterol 9/2022-well controlled    Hyperthyroidism    Hiatal hernia    MRSA infection  right lung- 2010    Sleep apnea  mild with dental device, no cpap machine    IBS (irritable bowel syndrome)    2019 novel coronavirus disease (COVID-19)  6/2022-prednisone RX    History of left bundle branch block (LBBB)    H/O exploratory laparotomy  at age 15-appendectomy, ovarian cystectomy    H/O colonoscopy  x3    H/O endoscopy  x2                                              -----------------------------------------------------------  ALLERGIES:  Apple Juice (Unknown)  IV Contrast (Other)  penicillins (Unknown)                                            ------------------------------------------------------------    HOME MEDICATIONS  Home Medications:  Advair Diskus:  (12 Feb 2023 12:33)  methIMAzole 5 mg oral tablet:  5 times /week (12 Feb 2023 12:33)  NexIUM 40 mg oral delayed release capsule: 1 cap(s) orally once a day (12 Feb 2023 12:33)  Pepcid 40 mg oral tablet: 1 tab(s) orally once a day (at bedtime), As Needed (12 Feb 2023 12:33)                           MEDICATIONS:  STANDING MEDICATIONS  albuterol/ipratropium for Nebulization 3 milliLiter(s) Nebulizer every 6 hours  budesonide 160 MICROgram(s)/formoterol 4.5 MICROgram(s) Inhaler 2 Puff(s) Inhalation two times a day  enoxaparin Injectable 40 milliGRAM(s) SubCutaneous every 24 hours  methimazole 5 milliGRAM(s) Oral daily  pantoprazole    Tablet 40 milliGRAM(s) Oral before breakfast  remdesivir  IVPB 200 milliGRAM(s) IV Intermittent once    PRN MEDICATIONS  aluminum hydroxide/magnesium hydroxide/simethicone Suspension 30 milliLiter(s) Oral every 4 hours PRN  diphenhydrAMINE Injectable 25 milliGRAM(s) IV Push once PRN  famotidine    Tablet 40 milliGRAM(s) Oral at bedtime PRN  melatonin 3 milliGRAM(s) Oral at bedtime PRN  ondansetron Injectable 4 milliGRAM(s) IV Push every 8 hours PRN                                            ------------------------------------------------------------  VITAL SIGNS: Last 24 Hours  Vital Signs Last 24 Hrs  T(C): 36.4 (15 Feb 2023 05:06), Max: 36.7 (14 Feb 2023 13:38)  T(F): 97.5 (15 Feb 2023 05:06), Max: 98.1 (14 Feb 2023 13:38)  HR: 86 (15 Feb 2023 05:06) (80 - 86)  BP: 112/47 (15 Feb 2023 05:06) (112/47 - 120/60)  BP(mean): --  RR: 18 (15 Feb 2023 05:06) (18 - 18)  SpO2: --                                           --------------------------------------------------------------  LABS:               LABS:                        10.3   15.68 )-----------( 158      ( 15 Feb 2023 06:56 )             32.2     02-15    137  |  100  |  15  ----------------------------<  79  4.2   |  28  |  0.7    Ca    8.8      15 Feb 2023 06:56  Mg     2.2     02-15    TPro  6.0  /  Alb  3.7  /  TBili  0.2  /  DBili  x   /  AST  36  /  ALT  93<H>  /  AlkPhos  160<H>  02-15    PT/INR - ( 15 Feb 2023 06:56 )   PT: 11.20 sec;   INR: 0.98 ratio      CARDIAC MARKERS ( 12 Feb 2023 05:41 )  x     / <0.01 ng/mL / x     / x     / x                                                  -------------------------------------------------------------  RADIOLOGY:                                            --------------------------------------------------------------    PHYSICAL EXAM:  CONSTITUTIONAL: Well-developed; well-nourished; in no acute distress.  SKIN: Skin exam is warm and dry, no acute rash.  HEAD: Normocephalic; atraumatic.  EYES: Pupils  reactive to light, Extraocular movements intact; conjunctiva and sclera clear.  ENT: No nasal discharge; airway clear. Moist mucus membranes.  NECK: Supple; non tender. No rigidity  CARD: Regular rate and rhythm. Normal S1, S2; no murmurs, gallops, or rubs.  RESP: CTAB. No wheezes, rales or rhonchi.  ABD: Abdomen soft; non-tender; non-distended  EXT: Normal ROM. No clubbing, cyanosis or edema.   NEURO: Alert and oriented x 3. No focal deficits.  PSYCH: cooperative, appropriate.                                            --------------------------------------------------------------    ASSESSMENT & PLAN    62-year-old female with history of asthma (never intubated no ICU admissions), hyperthyroidism, breast cancer on chemo, last chemo (february 1st 2023) presenting today with shortness of breath.      # COVID 19 infection - mild illness  # ? asthma exacerbation, SOB improved  - symptom onset: 2/9, exposed 2/6  - CXR: no acute cardiopulmonary disease, no focal consolidation   - CTA chest: No evidence of pulmonary embolus. No evidence of acute thoracic pathology.   - s/p 1x dose of empiric ABx Cefepime and Vancomycin   - blood and urine cultures negative   - c/w Duonebs q4 and PRN and Budesonide/formoterol 2 puffs BID   - CTAB, no wheezing > no steroids   - D-dimer 216, CRP 25, pro-marty 0.12, ferritin 1110  - RDV x5 days (today is day 3/5)    # Leukocytosis, downtrending  - likely 2/2 Neupogen on 2/1/23  - 30>18>16, monitor    # Transaminitis, downtrending  - >160  - AST 48>26  - >93  - bili wnl  - trend LFTs, PT/INR  - RUQ sono: negative     # Normocytic Anemia   - denies Melena, bloody BM, coffee ground emesis  - Baseline Hgb ~12.5 > stable around 10  - trend CBC, keep active T&S    # Breast Cancer  - on chemotherapy (Taxotene and Cytocan); finished second round february 1st 2023  - follows Dr Herrera  - Heme onc consulted: hold further chemo at this time as has active covid     # Hyperthyroidism   - c/w Methimazole 5mg qd    #DVT PPX : Lovenox  #Diet: Regular  #GI PPX: n/a  #Activity: AAT  #Code: full

## 2023-02-15 NOTE — CDI QUERY NOTE - NSCDIOTHERTXTBX_GEN_ALL_CORE_HH
CLINICAL INDICATORS    2/14 Hospitalist Progress Note: …SIRS present on admission… COVID positive since 2/9 - isolation per protocol… blood cultures negative… Shortness of breath due to asthma vs COVID… Breast cancer on chemotherapy… presented with shortness of breath, fever to 101…    Nursing Flowsheet:  •  (2/12 0421)  • RR 22 (2/12 0421)    Lab Results:  • WBC 33.65 (2/12)  • Ferritin 1111 (2/12)  • CRP 24.6 (2/12)    Orders:  • IV LR 2100 mL Bolus x1 (2/12)  • Blood cultures x2 (2/12)  • IV Vancomycin 1G x1 ind: sepsis (2/12)  • IV Cefepime 2G x1 ind: pna (2/12)  • IV Remdesivir 200 mg x1 (2/13)  • IV Remdesivir 100 mg Q24H ind: COVID-19 (2/14 – current)      Based on your professional judgment and the clinical indicators please clarify if SIRS present on admission can be further specified as:  • Sepsis associated with COVID-19  • No sepsis  • Other (please specify):  • Clinically unable to determine

## 2023-02-16 LAB
ALBUMIN SERPL ELPH-MCNC: 3.9 G/DL — SIGNIFICANT CHANGE UP (ref 3.5–5.2)
ALBUMIN SERPL ELPH-MCNC: 4.2 G/DL — SIGNIFICANT CHANGE UP (ref 3.5–5.2)
ALP SERPL-CCNC: 169 U/L — HIGH (ref 30–115)
ALP SERPL-CCNC: 172 U/L — HIGH (ref 30–115)
ALT FLD-CCNC: 104 U/L — HIGH (ref 0–41)
ALT FLD-CCNC: 106 U/L — HIGH (ref 0–41)
ANION GAP SERPL CALC-SCNC: 11 MMOL/L — SIGNIFICANT CHANGE UP (ref 7–14)
AST SERPL-CCNC: 53 U/L — HIGH (ref 0–41)
AST SERPL-CCNC: 56 U/L — HIGH (ref 0–41)
BASOPHILS # BLD AUTO: 0.08 K/UL — SIGNIFICANT CHANGE UP (ref 0–0.2)
BASOPHILS NFR BLD AUTO: 0.6 % — SIGNIFICANT CHANGE UP (ref 0–1)
BILIRUB DIRECT SERPL-MCNC: <0.2 MG/DL — SIGNIFICANT CHANGE UP (ref 0–0.3)
BILIRUB INDIRECT FLD-MCNC: >0.1 MG/DL — LOW (ref 0.2–1.2)
BILIRUB SERPL-MCNC: 0.3 MG/DL — SIGNIFICANT CHANGE UP (ref 0.2–1.2)
BILIRUB SERPL-MCNC: 0.3 MG/DL — SIGNIFICANT CHANGE UP (ref 0.2–1.2)
BUN SERPL-MCNC: 15 MG/DL — SIGNIFICANT CHANGE UP (ref 10–20)
CALCIUM SERPL-MCNC: 8.8 MG/DL — SIGNIFICANT CHANGE UP (ref 8.4–10.5)
CHLORIDE SERPL-SCNC: 102 MMOL/L — SIGNIFICANT CHANGE UP (ref 98–110)
CO2 SERPL-SCNC: 24 MMOL/L — SIGNIFICANT CHANGE UP (ref 17–32)
CREAT SERPL-MCNC: 0.6 MG/DL — LOW (ref 0.7–1.5)
EGFR: 101 ML/MIN/1.73M2 — SIGNIFICANT CHANGE UP
EOSINOPHIL # BLD AUTO: 0.02 K/UL — SIGNIFICANT CHANGE UP (ref 0–0.7)
EOSINOPHIL NFR BLD AUTO: 0.2 % — SIGNIFICANT CHANGE UP (ref 0–8)
GLUCOSE SERPL-MCNC: 134 MG/DL — HIGH (ref 70–99)
HCT VFR BLD CALC: 33.6 % — LOW (ref 37–47)
HGB BLD-MCNC: 10.7 G/DL — LOW (ref 12–16)
IMM GRANULOCYTES NFR BLD AUTO: 2.4 % — HIGH (ref 0.1–0.3)
INR BLD: 1.08 RATIO — SIGNIFICANT CHANGE UP (ref 0.65–1.3)
LYMPHOCYTES # BLD AUTO: 0.78 K/UL — LOW (ref 1.2–3.4)
LYMPHOCYTES # BLD AUTO: 5.9 % — LOW (ref 20.5–51.1)
MAGNESIUM SERPL-MCNC: 2.1 MG/DL — SIGNIFICANT CHANGE UP (ref 1.8–2.4)
MANUAL DIF COMMENT BLD-IMP: SIGNIFICANT CHANGE UP
MCHC RBC-ENTMCNC: 29.4 PG — SIGNIFICANT CHANGE UP (ref 27–31)
MCHC RBC-ENTMCNC: 31.8 G/DL — LOW (ref 32–37)
MCV RBC AUTO: 92.3 FL — SIGNIFICANT CHANGE UP (ref 81–99)
MONOCYTES # BLD AUTO: 0.85 K/UL — HIGH (ref 0.1–0.6)
MONOCYTES NFR BLD AUTO: 6.5 % — SIGNIFICANT CHANGE UP (ref 1.7–9.3)
NEUTROPHILS # BLD AUTO: 11.11 K/UL — HIGH (ref 1.4–6.5)
NEUTROPHILS NFR BLD AUTO: 84.4 % — HIGH (ref 42.2–75.2)
NRBC # BLD: 0 /100 WBCS — SIGNIFICANT CHANGE UP (ref 0–0)
PLATELET # BLD AUTO: 245 K/UL — SIGNIFICANT CHANGE UP (ref 130–400)
POTASSIUM SERPL-MCNC: 3.9 MMOL/L — SIGNIFICANT CHANGE UP (ref 3.5–5)
POTASSIUM SERPL-SCNC: 3.9 MMOL/L — SIGNIFICANT CHANGE UP (ref 3.5–5)
PROT SERPL-MCNC: 6.4 G/DL — SIGNIFICANT CHANGE UP (ref 6–8)
PROT SERPL-MCNC: 6.4 G/DL — SIGNIFICANT CHANGE UP (ref 6–8)
PROTHROM AB SERPL-ACNC: 12.3 SEC — SIGNIFICANT CHANGE UP (ref 9.95–12.87)
RBC # BLD: 3.64 M/UL — LOW (ref 4.2–5.4)
RBC # FLD: 14.4 % — SIGNIFICANT CHANGE UP (ref 11.5–14.5)
SODIUM SERPL-SCNC: 137 MMOL/L — SIGNIFICANT CHANGE UP (ref 135–146)
WBC # BLD: 13.16 K/UL — HIGH (ref 4.8–10.8)
WBC # FLD AUTO: 13.16 K/UL — HIGH (ref 4.8–10.8)

## 2023-02-16 PROCEDURE — 99231 SBSQ HOSP IP/OBS SF/LOW 25: CPT

## 2023-02-16 RX ORDER — REMDESIVIR 5 MG/ML
100 INJECTION INTRAVENOUS EVERY 24 HOURS
Refills: 0 | Status: COMPLETED | OUTPATIENT
Start: 2023-02-16 | End: 2023-02-17

## 2023-02-16 RX ADMIN — Medication 30 MILLILITER(S): at 15:49

## 2023-02-16 RX ADMIN — PANTOPRAZOLE SODIUM 40 MILLIGRAM(S): 20 TABLET, DELAYED RELEASE ORAL at 06:49

## 2023-02-16 RX ADMIN — REMDESIVIR 200 MILLIGRAM(S): 5 INJECTION INTRAVENOUS at 21:08

## 2023-02-16 RX ADMIN — ENOXAPARIN SODIUM 40 MILLIGRAM(S): 100 INJECTION SUBCUTANEOUS at 18:06

## 2023-02-16 NOTE — PROGRESS NOTE ADULT - SUBJECTIVE AND OBJECTIVE BOX
JOHNY RODRIGUEZ 62y Female  MRN#: [id number]     Hospital Day: 4d    Pt is currently admitted with the primary diagnosis of Covid/Asthma exacerbation    SUBJECTIVE    Patient seen and examined at bedside, resting comfortably in NAD. SOB improved. Patient reports fatigue but no other complaints.                                           ----------------------------------------------------------  OBJECTIVE  PAST MEDICAL & SURGICAL HISTORY  Diverticulitis    Asthma  last use albuterol 9/2022-well controlled    Hyperthyroidism    Hiatal hernia    MRSA infection  right lung- 2010    Sleep apnea  mild with dental device, no cpap machine    IBS (irritable bowel syndrome)    2019 novel coronavirus disease (COVID-19)  6/2022-prednisone RX    History of left bundle branch block (LBBB)    H/O exploratory laparotomy  at age 15-appendectomy, ovarian cystectomy    H/O colonoscopy  x3    H/O endoscopy  x2                                              -----------------------------------------------------------  ALLERGIES:  Apple Juice (Unknown)  IV Contrast (Other)  penicillins (Unknown)                                            ------------------------------------------------------------    HOME MEDICATIONS  Home Medications:  Advair Diskus:  (12 Feb 2023 12:33)  methIMAzole 5 mg oral tablet:  5 times /week (12 Feb 2023 12:33)  NexIUM 40 mg oral delayed release capsule: 1 cap(s) orally once a day (12 Feb 2023 12:33)  Pepcid 40 mg oral tablet: 1 tab(s) orally once a day (at bedtime), As Needed (12 Feb 2023 12:33)                           MEDICATIONS:  STANDING MEDICATIONS  albuterol/ipratropium for Nebulization 3 milliLiter(s) Nebulizer every 6 hours  budesonide 160 MICROgram(s)/formoterol 4.5 MICROgram(s) Inhaler 2 Puff(s) Inhalation two times a day  enoxaparin Injectable 40 milliGRAM(s) SubCutaneous every 24 hours  methimazole 5 milliGRAM(s) Oral daily  pantoprazole    Tablet 40 milliGRAM(s) Oral before breakfast  remdesivir  IVPB 200 milliGRAM(s) IV Intermittent once    PRN MEDICATIONS  aluminum hydroxide/magnesium hydroxide/simethicone Suspension 30 milliLiter(s) Oral every 4 hours PRN  diphenhydrAMINE Injectable 25 milliGRAM(s) IV Push once PRN  famotidine    Tablet 40 milliGRAM(s) Oral at bedtime PRN  melatonin 3 milliGRAM(s) Oral at bedtime PRN  ondansetron Injectable 4 milliGRAM(s) IV Push every 8 hours PRN                                            ------------------------------------------------------------  VITAL SIGNS: Last 24 Hours  Vital Signs Last 24 Hrs  T(C): 35.8 (16 Feb 2023 03:41), Max: 36.1 (15 Feb 2023 14:29)  T(F): 96.4 (16 Feb 2023 03:41), Max: 97 (15 Feb 2023 14:29)  HR: 89 (16 Feb 2023 03:41) (83 - 89)  BP: 107/55 (16 Feb 2023 03:41) (107/55 - 126/55)  BP(mean): --  RR: 18 (16 Feb 2023 03:41) (18 - 18)  SpO2: 97% (15 Feb 2023 14:29) (97% - 97%)                                         --------------------------------------------------------------  LABS:               LABS:                        10.3   15.68 )-----------( 158      ( 15 Feb 2023 06:56 )             32.2     02-15    137  |  100  |  15  ----------------------------<  79  4.2   |  28  |  0.7    Ca    8.8      15 Feb 2023 06:56  Mg     2.2     02-15    TPro  6.0  /  Alb  3.7  /  TBili  0.2  /  DBili  <0.2  /  AST  36  /  ALT  93<H>  /  AlkPhos  160<H>  02-15    PT/INR - ( 15 Feb 2023 06:56 )   PT: 11.20 sec;   INR: 0.98 ratio                                                  -------------------------------------------------------------  RADIOLOGY:                                            --------------------------------------------------------------    PHYSICAL EXAM:  CONSTITUTIONAL: Well-developed; well-nourished; in no acute distress.  SKIN: Skin exam is warm and dry, no acute rash.  HEAD: Normocephalic; atraumatic.  EYES: Pupils  reactive to light, Extraocular movements intact; conjunctiva and sclera clear.  ENT: No nasal discharge; airway clear. Moist mucus membranes.  NECK: Supple; non tender. No rigidity  CARD: Regular rate and rhythm. Normal S1, S2; no murmurs, gallops, or rubs.  RESP: CTAB. No wheezes, rales or rhonchi.  ABD: Abdomen soft; non-tender; non-distended  EXT: Normal ROM. No clubbing, cyanosis or edema.   NEURO: Alert and oriented x 3. No focal deficits.  PSYCH: cooperative, appropriate.                                            --------------------------------------------------------------    ASSESSMENT & PLAN    62-year-old female with history of asthma (never intubated no ICU admissions), hyperthyroidism, breast cancer on chemo, last chemo (february 1st 2023) presenting today with shortness of breath.      # COVID 19 infection - mild illness  # ? asthma exacerbation, SOB improved  - symptom onset: 2/9, exposed 2/6  - CXR: no acute cardiopulmonary disease, no focal consolidation   - CTA chest: No evidence of pulmonary embolus. No evidence of acute thoracic pathology.   - s/p 1x dose of empiric ABx Cefepime and Vancomycin   - blood and urine cultures negative   - c/w Duonebs q4 and PRN and Budesonide/formoterol 2 puffs BID   - CTAB, no wheezing > no steroids   - D-dimer 216, CRP 25, pro-marty 0.12, ferritin 1110  - RDV x5 days (today is day 4/5)  - anticipate d/c home after 5/5 RDV tomorrow     # Leukocytosis, downtrending  - likely 2/2 Neupogen on 2/1/23  - 30>18>16, monitor    # Transaminitis, downtrending  - >160  - AST 48>26  - >93  - bili wnl  - trend LFTs, PT/INR  - RUQ sono: negative     # Normocytic Anemia   - denies Melena, bloody BM, coffee ground emesis  - Baseline Hgb ~12.5 > stable around 10  - trend CBC, keep active T&S    # Breast Cancer  - on chemotherapy (Taxotene and Cytocan); finished second round february 1st 2023  - follows Dr Herrera  - Heme onc consulted: hold further chemo at this time as has active covid   - f/u out-patient     # Hyperthyroidism   - c/w Methimazole 5mg qd    #DVT PPX : Lovenox  #Diet: Regular  #GI PPX: n/a  #Activity: AAT  #Code: full

## 2023-02-16 NOTE — PROGRESS NOTE ADULT - SUBJECTIVE AND OBJECTIVE BOX
JOHNY RODRIGUEZ  62y  [facility] [address]      Patient is a 62y old  Female who presents with a chief complaint of fever (15 Feb 2023 17:40)      INTERVAL HPI/OVERNIGHT EVENTS:        REVIEW OF SYSTEMS:        FAMILY HISTORY:  No pertinent family history (Father, Mother)      T(C): 35.8 (02-16-23 @ 03:41), Max: 36.1 (02-15-23 @ 14:29)  HR: 89 (02-16-23 @ 03:41) (83 - 89)  BP: 107/55 (02-16-23 @ 03:41) (107/55 - 126/55)  RR: 18 (02-16-23 @ 03:41) (18 - 18)  SpO2: 97% (02-15-23 @ 14:29) (97% - 97%)  Wt(kg): --Vital Signs Last 24 Hrs  T(C): 35.8 (16 Feb 2023 03:41), Max: 36.1 (15 Feb 2023 14:29)  T(F): 96.4 (16 Feb 2023 03:41), Max: 97 (15 Feb 2023 14:29)  HR: 89 (16 Feb 2023 03:41) (83 - 89)  BP: 107/55 (16 Feb 2023 03:41) (107/55 - 126/55)  BP(mean): --  RR: 18 (16 Feb 2023 03:41) (18 - 18)  SpO2: 97% (15 Feb 2023 14:29) (97% - 97%)        PHYSICAL EXAM:  GENERAL: NAD, well-groomed, well-developed  HEAD:  Atraumatic, Normocephalic  EYES: EOMI, PERRLA, conjunctiva and sclera clear  ENMT: No tonsillar erythema, exudates, or enlargement; Moist mucous membranes, Good dentition, No lesions  NECK: Supple, No JVD, Normal thyroid  NERVOUS SYSTEM:  Alert & Oriented X3, Good concentration; Motor Strength 5/5 B/L upper and lower extremities; DTRs 2+ intact and symmetric  PULM: Clear to auscultation bilaterally  CARDIAC: Regular rate and rhythm; No murmurs, rubs, or gallops  GI: Soft, Nontender, Nondistended; Bowel sounds present  EXTREMITIES:  2+ Peripheral Pulses, No clubbing, cyanosis, or edema  LYMPH: No lymphadenopathy noted  SKIN: No rashes or lesions    Consultant(s) Notes Reviewed:  [x ] YES  [ ] NO  Care Discussed with Consultants/Other Providers [ x] YES  [ ] NO    LABS:                            10.3   15.68 )-----------( 158      ( 15 Feb 2023 06:56 )             32.2   02-15    137  |  100  |  15  ----------------------------<  79  4.2   |  28  |  0.7    Ca    8.8      15 Feb 2023 06:56  Mg     2.2     02-15    TPro  6.0  /  Alb  3.7  /  TBili  0.2  /  DBili  <0.2  /  AST  36  /  ALT  93<H>  /  AlkPhos  160<H>  02-15            acetaminophen     Tablet .. 650 milliGRAM(s) Oral every 6 hours PRN  albuterol/ipratropium for Nebulization 3 milliLiter(s) Nebulizer every 6 hours  aluminum hydroxide/magnesium hydroxide/simethicone Suspension 30 milliLiter(s) Oral every 4 hours PRN  budesonide 160 MICROgram(s)/formoterol 4.5 MICROgram(s) Inhaler 2 Puff(s) Inhalation two times a day  diphenhydrAMINE Injectable 25 milliGRAM(s) IV Push once PRN  enoxaparin Injectable 40 milliGRAM(s) SubCutaneous every 24 hours  famotidine    Tablet 40 milliGRAM(s) Oral at bedtime PRN  melatonin 3 milliGRAM(s) Oral at bedtime PRN  methimazole 5 milliGRAM(s) Oral daily  ondansetron Injectable 4 milliGRAM(s) IV Push every 8 hours PRN  pantoprazole    Tablet 40 milliGRAM(s) Oral before breakfast      63 y/o woman with history of asthma (never intubated and no ICU admissions), hyperthyroidism, breast cancer on chemotherapy with Dr. Pappas (last chemo on 2/1/23 and s/p eflapegrastim on 2/2/23) presented with shortness of breath, fever to 101 and + COVID test on 2/9/23.  Patient is vaccinated x3 with moderna last dose 12/21.    1. Sepsis present on admission due to COVID 19 infection  - Admit to medicine         - D-dimer:216        - Ferritin >1000       - Procalcitonin:negative     -ua:negative       - COVID 19: positive   - Cont remdisivir d:4/5   - No steroid as pt was not hypoxic   - Blood cx:NTD   - Transaminitis. MOE: WNL   - Cont Symbicort   - CT angio chest: no  acute PE   - ID consult appreciated             2. Breast cancer on chemotherapy  HemOnc Dr Guan appreciated: chemo on hold for now -> outpt f/u with Dr. Pappas    3. Hyperthyroid on methimazole    4. DVT prophylaxis on lovenox        full code           JOHNY RODRIGUEZ  62y  [facility] [address]      Patient is a 62y old  Female who presents with a chief complaint of fever (15 Feb 2023 17:40)      INTERVAL HPI/OVERNIGHT EVENTS:  Patient feels well  She has no complains. off oxygen. sob resolved. no other events noted             FAMILY HISTORY:  No pertinent family history (Father, Mother)      T(C): 35.8 (02-16-23 @ 03:41), Max: 36.1 (02-15-23 @ 14:29)  HR: 89 (02-16-23 @ 03:41) (83 - 89)  BP: 107/55 (02-16-23 @ 03:41) (107/55 - 126/55)  RR: 18 (02-16-23 @ 03:41) (18 - 18)  SpO2: 97% (02-15-23 @ 14:29) (97% - 97%)  Wt(kg): --Vital Signs Last 24 Hrs  T(C): 35.8 (16 Feb 2023 03:41), Max: 36.1 (15 Feb 2023 14:29)  T(F): 96.4 (16 Feb 2023 03:41), Max: 97 (15 Feb 2023 14:29)  HR: 89 (16 Feb 2023 03:41) (83 - 89)  BP: 107/55 (16 Feb 2023 03:41) (107/55 - 126/55)  BP(mean): --  RR: 18 (16 Feb 2023 03:41) (18 - 18)  SpO2: 97% (15 Feb 2023 14:29) (97% - 97%)        PHYSICAL EXAM:  GENERAL: NAD, well-groomed, well-developed    NERVOUS SYSTEM:  Alert & Oriented X3,  PULM: Clear to auscultation bilaterally  CARDIAC: Regular rate and rhythm;  GI: Soft, Nontender, Nondistended; Bowel sounds present  EXTREMITIES:  2+ Peripheral Pulses,    Consultant(s) Notes Reviewed:  [x ] YES  [ ] NO  Care Discussed with Consultants/Other Providers [ x] YES  [ ] NO    LABS:                            10.3   15.68 )-----------( 158      ( 15 Feb 2023 06:56 )             32.2   02-15    137  |  100  |  15  ----------------------------<  79  4.2   |  28  |  0.7    Ca    8.8      15 Feb 2023 06:56  Mg     2.2     02-15    TPro  6.0  /  Alb  3.7  /  TBili  0.2  /  DBili  <0.2  /  AST  36  /  ALT  93<H>  /  AlkPhos  160<H>  02-15            acetaminophen     Tablet .. 650 milliGRAM(s) Oral every 6 hours PRN  albuterol/ipratropium for Nebulization 3 milliLiter(s) Nebulizer every 6 hours  aluminum hydroxide/magnesium hydroxide/simethicone Suspension 30 milliLiter(s) Oral every 4 hours PRN  budesonide 160 MICROgram(s)/formoterol 4.5 MICROgram(s) Inhaler 2 Puff(s) Inhalation two times a day  diphenhydrAMINE Injectable 25 milliGRAM(s) IV Push once PRN  enoxaparin Injectable 40 milliGRAM(s) SubCutaneous every 24 hours  famotidine    Tablet 40 milliGRAM(s) Oral at bedtime PRN  melatonin 3 milliGRAM(s) Oral at bedtime PRN  methimazole 5 milliGRAM(s) Oral daily  ondansetron Injectable 4 milliGRAM(s) IV Push every 8 hours PRN  pantoprazole    Tablet 40 milliGRAM(s) Oral before breakfast      61 y/o woman with history of asthma (never intubated and no ICU admissions), hyperthyroidism, breast cancer on chemotherapy with Dr. Pappas (last chemo on 2/1/23 and s/p eflapegrastim on 2/2/23) presented with shortness of breath, fever to 101 and + COVID test on 2/9/23.  Patient is vaccinated x3 with moderna last dose 12/21.    1. Sepsis present on admission due to COVID 19 infection  - Admit to medicine         - D-dimer:216        - Ferritin >1000       - Procalcitonin:negative     -ua:negative       - COVID 19: positive   - Cont remdisivir d:4/5   - No steroid as pt was not hypoxic   - Blood cx:NTD   - Transaminitis. MOE: WNL   - Cont Symbicort   - CT angio chest: no  acute PE   - ID consult appreciated             2. Breast cancer on chemotherapy  HemOnc Dr Guan appreciated: chemo on hold for now -> outpt f/u with Dr. Pappas    3. Hyperthyroid on methimazole    4. DVT prophylaxis on lovenox        full code    Plan for discharge tmr after remdisivir dose

## 2023-02-16 NOTE — PROGRESS NOTE ADULT - PROVIDER SPECIALTY LIST ADULT
Hospitalist
Internal Medicine
Hospitalist
Hospitalist
Internal Medicine
Hospitalist
Heme/Onc
Infectious Disease

## 2023-02-17 ENCOUNTER — TRANSCRIPTION ENCOUNTER (OUTPATIENT)
Age: 63
End: 2023-02-17

## 2023-02-17 VITALS
DIASTOLIC BLOOD PRESSURE: 56 MMHG | TEMPERATURE: 99 F | HEART RATE: 86 BPM | SYSTOLIC BLOOD PRESSURE: 112 MMHG | RESPIRATION RATE: 18 BRPM

## 2023-02-17 LAB
ALBUMIN SERPL ELPH-MCNC: 3.7 G/DL — SIGNIFICANT CHANGE UP (ref 3.5–5.2)
ALBUMIN SERPL ELPH-MCNC: 3.8 G/DL — SIGNIFICANT CHANGE UP (ref 3.5–5.2)
ALP SERPL-CCNC: 152 U/L — HIGH (ref 30–115)
ALP SERPL-CCNC: 153 U/L — HIGH (ref 30–115)
ALT FLD-CCNC: 157 U/L — HIGH (ref 0–41)
ALT FLD-CCNC: 158 U/L — HIGH (ref 0–41)
ANION GAP SERPL CALC-SCNC: 12 MMOL/L — SIGNIFICANT CHANGE UP (ref 7–14)
AST SERPL-CCNC: 102 U/L — HIGH (ref 0–41)
AST SERPL-CCNC: 104 U/L — HIGH (ref 0–41)
BASOPHILS # BLD AUTO: 0.05 K/UL — SIGNIFICANT CHANGE UP (ref 0–0.2)
BASOPHILS NFR BLD AUTO: 0.5 % — SIGNIFICANT CHANGE UP (ref 0–1)
BILIRUB DIRECT SERPL-MCNC: <0.2 MG/DL — SIGNIFICANT CHANGE UP (ref 0–0.3)
BILIRUB INDIRECT FLD-MCNC: >0.1 MG/DL — LOW (ref 0.2–1.2)
BILIRUB SERPL-MCNC: 0.3 MG/DL — SIGNIFICANT CHANGE UP (ref 0.2–1.2)
BILIRUB SERPL-MCNC: 0.3 MG/DL — SIGNIFICANT CHANGE UP (ref 0.2–1.2)
BUN SERPL-MCNC: 18 MG/DL — SIGNIFICANT CHANGE UP (ref 10–20)
CALCIUM SERPL-MCNC: 8.7 MG/DL — SIGNIFICANT CHANGE UP (ref 8.4–10.5)
CHLORIDE SERPL-SCNC: 99 MMOL/L — SIGNIFICANT CHANGE UP (ref 98–110)
CO2 SERPL-SCNC: 26 MMOL/L — SIGNIFICANT CHANGE UP (ref 17–32)
CREAT SERPL-MCNC: 0.6 MG/DL — LOW (ref 0.7–1.5)
CULTURE RESULTS: SIGNIFICANT CHANGE UP
CULTURE RESULTS: SIGNIFICANT CHANGE UP
EGFR: 101 ML/MIN/1.73M2 — SIGNIFICANT CHANGE UP
EOSINOPHIL # BLD AUTO: 0.02 K/UL — SIGNIFICANT CHANGE UP (ref 0–0.7)
EOSINOPHIL NFR BLD AUTO: 0.2 % — SIGNIFICANT CHANGE UP (ref 0–8)
GLUCOSE SERPL-MCNC: 92 MG/DL — SIGNIFICANT CHANGE UP (ref 70–99)
HCT VFR BLD CALC: 33.3 % — LOW (ref 37–47)
HGB BLD-MCNC: 10.7 G/DL — LOW (ref 12–16)
IMM GRANULOCYTES NFR BLD AUTO: 2.1 % — HIGH (ref 0.1–0.3)
INR BLD: 1.04 RATIO — SIGNIFICANT CHANGE UP (ref 0.65–1.3)
LYMPHOCYTES # BLD AUTO: 0.63 K/UL — LOW (ref 1.2–3.4)
LYMPHOCYTES # BLD AUTO: 6 % — LOW (ref 20.5–51.1)
MAGNESIUM SERPL-MCNC: 2.3 MG/DL — SIGNIFICANT CHANGE UP (ref 1.8–2.4)
MCHC RBC-ENTMCNC: 29.8 PG — SIGNIFICANT CHANGE UP (ref 27–31)
MCHC RBC-ENTMCNC: 32.1 G/DL — SIGNIFICANT CHANGE UP (ref 32–37)
MCV RBC AUTO: 92.8 FL — SIGNIFICANT CHANGE UP (ref 81–99)
MONOCYTES # BLD AUTO: 0.91 K/UL — HIGH (ref 0.1–0.6)
MONOCYTES NFR BLD AUTO: 8.6 % — SIGNIFICANT CHANGE UP (ref 1.7–9.3)
NEUTROPHILS # BLD AUTO: 8.71 K/UL — HIGH (ref 1.4–6.5)
NEUTROPHILS NFR BLD AUTO: 82.6 % — HIGH (ref 42.2–75.2)
NRBC # BLD: 0 /100 WBCS — SIGNIFICANT CHANGE UP (ref 0–0)
PLATELET # BLD AUTO: 293 K/UL — SIGNIFICANT CHANGE UP (ref 130–400)
POTASSIUM SERPL-MCNC: 4.6 MMOL/L — SIGNIFICANT CHANGE UP (ref 3.5–5)
POTASSIUM SERPL-SCNC: 4.6 MMOL/L — SIGNIFICANT CHANGE UP (ref 3.5–5)
PROT SERPL-MCNC: 6.3 G/DL — SIGNIFICANT CHANGE UP (ref 6–8)
PROT SERPL-MCNC: 6.3 G/DL — SIGNIFICANT CHANGE UP (ref 6–8)
PROTHROM AB SERPL-ACNC: 11.9 SEC — SIGNIFICANT CHANGE UP (ref 9.95–12.87)
RBC # BLD: 3.59 M/UL — LOW (ref 4.2–5.4)
RBC # FLD: 14.4 % — SIGNIFICANT CHANGE UP (ref 11.5–14.5)
SODIUM SERPL-SCNC: 137 MMOL/L — SIGNIFICANT CHANGE UP (ref 135–146)
SPECIMEN SOURCE: SIGNIFICANT CHANGE UP
SPECIMEN SOURCE: SIGNIFICANT CHANGE UP
WBC # BLD: 10.54 K/UL — SIGNIFICANT CHANGE UP (ref 4.8–10.8)
WBC # FLD AUTO: 10.54 K/UL — SIGNIFICANT CHANGE UP (ref 4.8–10.8)

## 2023-02-17 PROCEDURE — 99239 HOSP IP/OBS DSCHRG MGMT >30: CPT

## 2023-02-17 RX ORDER — METHIMAZOLE 10 MG/1
1 TABLET ORAL
Qty: 0 | Refills: 0 | DISCHARGE

## 2023-02-17 RX ADMIN — REMDESIVIR 200 MILLIGRAM(S): 5 INJECTION INTRAVENOUS at 17:49

## 2023-02-17 RX ADMIN — PANTOPRAZOLE SODIUM 40 MILLIGRAM(S): 20 TABLET, DELAYED RELEASE ORAL at 05:41

## 2023-02-17 NOTE — DISCHARGE NOTE NURSING/CASE MANAGEMENT/SOCIAL WORK - PATIENT PORTAL LINK FT
You can access the FollowMyHealth Patient Portal offered by [facility] by registering at the following website: [email or web address]. By joining [employer]’s FollowMyHealth portal, you will also be able to view your health information using other applications (apps) compatible with our system.

## 2023-02-17 NOTE — DISCHARGE NOTE NURSING/CASE MANAGEMENT/SOCIAL WORK - NSDCPEFALRISK_GEN_ALL_CORE
For information on Fall & Injury Prevention, visit: [email or web address] OR  [email or web address] OR  https://www.cdc.gov/steadi/patient.html

## 2023-02-17 NOTE — DISCHARGE NOTE PROVIDER - HOSPITAL COURSE
63 y/o woman with history of asthma (never intubated and no ICU admissions), hyperthyroidism, breast cancer on chemotherapy with Dr. Pappas (last chemo on 2/1/23 and s/p eflapegrastim on 2/2/23) presented with shortness of breath, fever to 101 and + COVID test on 2/9/23.  Patient is vaccinated x3 with moderna last dose 12/21.    1. Sepsis present on admission due to COVID 19 infection resolved   - Admit to medicine         - D-dimer:216        - Ferritin >1000       - Procalcitonin:negative     -ua:negative       - COVID 19: positive   - Finished remdisivir d:5/5   - No steroid as pt was not hypoxic   - Blood cx:NTD   - Transaminitis. MOE: WNL   - Cont Symbicort   - CT angio chest: no  acute PE   - ID consult appreciated             2. Breast cancer on chemotherapy  HemOnc Dr Guan appreciated: chemo on hold for now -> outpt f/u with Dr. Pappas    3. Hyperthyroid on methimazole    patient feels well. has no complains. agreeable with dc planning.

## 2023-02-17 NOTE — DISCHARGE NOTE PROVIDER - NSDCFUSCHEDAPPT_GEN_ALL_CORE_FT
Nazario Alejo  [facility]  PULMMED [address]  Scheduled Appointment: 03/28/2023    [facility]  PULED [address]  Scheduled Appointment: 03/28/2023

## 2023-02-17 NOTE — DISCHARGE NOTE PROVIDER - PROVIDER TOKENS
PROVIDER:[TOKEN:[[id number]],FOLLOWUP:[1 week]],PROVIDER:[TOKEN:[[id number]],FOLLOWUP:[1 week]]

## 2023-02-17 NOTE — DISCHARGE NOTE PROVIDER - NSDCMRMEDTOKEN_GEN_ALL_CORE_FT
Advair Diskus:   albuterol 2.5 mg/3 mL (0.083%) inhalation solution: 3 milliliter(s) by nebulizer every 6 hours, As Needed -for shortness of breath and/or wheezing   methIMAzole 5 mg oral tablet: 1  orally once a day  NexIUM 40 mg oral delayed release capsule: 1 cap(s) orally once a day  Pepcid 40 mg oral tablet: 1 tab(s) orally once a day (at bedtime), As Needed

## 2023-02-17 NOTE — DISCHARGE NOTE PROVIDER - ATTENDING DISCHARGE PHYSICAL EXAMINATION:
VITALS:   T(C): 37 (02-17-23 @ 04:22), Max: 37 (02-17-23 @ 04:22)  HR: 80 (02-17-23 @ 04:22) (80 - 107)  BP: 115/54 (02-17-23 @ 04:22) (107/56 - 115/54)  RR: 19 (02-17-23 @ 04:22) (18 - 19)  SpO2: 97% (02-17-23 @ 08:03) (97% - 97%)    GENERAL: NAD, lying in bed comfortably  HEART: Regular rate and rhythm, no murmurs, rubs, or gallops  LUNGS: Unlabored respirations.  Clear to auscultation bilaterally, no crackles, wheezing, or rhonchi  ABDOMEN: Soft, nontender, nondistended, +BS  EXTREMITIES: 2+ peripheral pulses bilaterally. No clubbing, cyanosis, or edema  NERVOUS SYSTEM:  A&Ox3, no focal deficits

## 2023-02-17 NOTE — DISCHARGE NOTE PROVIDER - CARE PROVIDER_API CALL
Zuri Richard E  Internal Medicine  [facility] [place] [address]  Phone: [phone]  Fax: [phone]  Follow Up Time: 1 week    Mell Herrera)  Hematology; Internal Medicine; Medical Oncology  [address] [place] [address]  Phone: [phone]  Fax: [phone]  Follow Up Time: 1 week

## 2023-02-17 NOTE — DISCHARGE NOTE PROVIDER - NSDCCPCAREPLAN_GEN_ALL_CORE_FT
PRINCIPAL DISCHARGE DIAGNOSIS  Diagnosis: COVID-19  Assessment and Plan of Treatment: You came into the hospital for shortness of breath. You were found to be positive for Covid-19 and were treated with Remdesevir for 5 days to prevent serious infection. Your chemotherapy is put on hold for now. Please follow-up with Dr. Pappas regarding your chemo treatment plan. Follow-up with your primary care physician. Continue taking all medications as prescribed.

## 2023-02-17 NOTE — HISTORY OF PRESENT ILLNESS
[Home] : at home, [unfilled] , at the time of the visit. [Medical Office: ([facility])___] : at the medical office located in  [Verbal consent obtained from patient] : the patient, [unfilled] [FreeTextEntry4] : Sylvia Rutherford [de-identified] : 59 year old female patient average risk for CRC, fam hx of esophageal cancer, personal hx of colon TAs, hx of IBS and bloating, last colonoscopy in 2020 had 1 TA removed, and EGD in 2020 with grade 1 esophagitis and non HP gastritis.\par Now she presents with heartburn, occasional dysphagia, and morning nausea. \par  No weight loss, blood in the stools or night symptoms. \par   room air

## 2023-02-23 DIAGNOSIS — E05.90 THYROTOXICOSIS, UNSPECIFIED WITHOUT THYROTOXIC CRISIS OR STORM: ICD-10-CM

## 2023-02-23 DIAGNOSIS — Z91.018 ALLERGY TO OTHER FOODS: ICD-10-CM

## 2023-02-23 DIAGNOSIS — J45.909 UNSPECIFIED ASTHMA, UNCOMPLICATED: ICD-10-CM

## 2023-02-23 DIAGNOSIS — D64.9 ANEMIA, UNSPECIFIED: ICD-10-CM

## 2023-02-23 DIAGNOSIS — Z91.041 RADIOGRAPHIC DYE ALLERGY STATUS: ICD-10-CM

## 2023-02-23 DIAGNOSIS — Z88.0 ALLERGY STATUS TO PENICILLIN: ICD-10-CM

## 2023-02-23 DIAGNOSIS — U07.1 COVID-19: ICD-10-CM

## 2023-02-23 DIAGNOSIS — A41.89 OTHER SPECIFIED SEPSIS: ICD-10-CM

## 2023-02-23 DIAGNOSIS — C50.919 MALIGNANT NEOPLASM OF UNSPECIFIED SITE OF UNSPECIFIED FEMALE BREAST: ICD-10-CM

## 2023-02-23 DIAGNOSIS — R74.01 ELEVATION OF LEVELS OF LIVER TRANSAMINASE LEVELS: ICD-10-CM

## 2023-03-09 ENCOUNTER — EMERGENCY (EMERGENCY)
Facility: HOSPITAL | Age: 63
LOS: 0 days | Discharge: HOME | End: 2023-03-09
Attending: STUDENT IN AN ORGANIZED HEALTH CARE EDUCATION/TRAINING PROGRAM
Payer: COMMERCIAL

## 2023-03-09 VITALS
RESPIRATION RATE: 18 BRPM | TEMPERATURE: 99 F | SYSTOLIC BLOOD PRESSURE: 135 MMHG | HEIGHT: 65 IN | OXYGEN SATURATION: 99 % | DIASTOLIC BLOOD PRESSURE: 65 MMHG | HEART RATE: 122 BPM

## 2023-03-09 DIAGNOSIS — E05.90 THYROTOXICOSIS, UNSPECIFIED WITHOUT THYROTOXIC CRISIS OR STORM: ICD-10-CM

## 2023-03-09 DIAGNOSIS — Z86.16 PERSONAL HISTORY OF COVID-19: ICD-10-CM

## 2023-03-09 DIAGNOSIS — Z86.19 PERSONAL HISTORY OF OTHER INFECTIOUS AND PARASITIC DISEASES: ICD-10-CM

## 2023-03-09 DIAGNOSIS — R14.0 ABDOMINAL DISTENSION (GASEOUS): ICD-10-CM

## 2023-03-09 DIAGNOSIS — G47.30 SLEEP APNEA, UNSPECIFIED: ICD-10-CM

## 2023-03-09 DIAGNOSIS — R53.1 WEAKNESS: ICD-10-CM

## 2023-03-09 DIAGNOSIS — Z91.018 ALLERGY TO OTHER FOODS: ICD-10-CM

## 2023-03-09 DIAGNOSIS — Z87.19 PERSONAL HISTORY OF OTHER DISEASES OF THE DIGESTIVE SYSTEM: ICD-10-CM

## 2023-03-09 DIAGNOSIS — Z20.822 CONTACT WITH AND (SUSPECTED) EXPOSURE TO COVID-19: ICD-10-CM

## 2023-03-09 DIAGNOSIS — Z88.0 ALLERGY STATUS TO PENICILLIN: ICD-10-CM

## 2023-03-09 DIAGNOSIS — Z98.890 OTHER SPECIFIED POSTPROCEDURAL STATES: Chronic | ICD-10-CM

## 2023-03-09 DIAGNOSIS — Z91.041 RADIOGRAPHIC DYE ALLERGY STATUS: ICD-10-CM

## 2023-03-09 DIAGNOSIS — J45.909 UNSPECIFIED ASTHMA, UNCOMPLICATED: ICD-10-CM

## 2023-03-09 DIAGNOSIS — Z86.79 PERSONAL HISTORY OF OTHER DISEASES OF THE CIRCULATORY SYSTEM: ICD-10-CM

## 2023-03-09 LAB
ALBUMIN SERPL ELPH-MCNC: 4 G/DL — SIGNIFICANT CHANGE UP (ref 3.5–5.2)
ALP SERPL-CCNC: 158 U/L — HIGH (ref 30–115)
ALT FLD-CCNC: 50 U/L — HIGH (ref 0–41)
ANION GAP SERPL CALC-SCNC: 12 MMOL/L — SIGNIFICANT CHANGE UP (ref 7–14)
AST SERPL-CCNC: 49 U/L — HIGH (ref 0–41)
BASE EXCESS BLDV CALC-SCNC: 4.3 MMOL/L — HIGH (ref -2–3)
BASOPHILS # BLD AUTO: 0 K/UL — SIGNIFICANT CHANGE UP (ref 0–0.2)
BASOPHILS NFR BLD AUTO: 0 % — SIGNIFICANT CHANGE UP (ref 0–1)
BILIRUB SERPL-MCNC: 0.3 MG/DL — SIGNIFICANT CHANGE UP (ref 0.2–1.2)
BUN SERPL-MCNC: 9 MG/DL — LOW (ref 10–20)
CA-I SERPL-SCNC: 1.2 MMOL/L — SIGNIFICANT CHANGE UP (ref 1.15–1.33)
CALCIUM SERPL-MCNC: 9.3 MG/DL — SIGNIFICANT CHANGE UP (ref 8.4–10.4)
CHLORIDE SERPL-SCNC: 103 MMOL/L — SIGNIFICANT CHANGE UP (ref 98–110)
CO2 SERPL-SCNC: 28 MMOL/L — SIGNIFICANT CHANGE UP (ref 17–32)
CREAT SERPL-MCNC: 0.7 MG/DL — SIGNIFICANT CHANGE UP (ref 0.7–1.5)
EGFR: 97 ML/MIN/1.73M2 — SIGNIFICANT CHANGE UP
EOSINOPHIL # BLD AUTO: 0 K/UL — SIGNIFICANT CHANGE UP (ref 0–0.7)
EOSINOPHIL NFR BLD AUTO: 0 % — SIGNIFICANT CHANGE UP (ref 0–8)
FLUAV AG NPH QL: SIGNIFICANT CHANGE UP
FLUBV AG NPH QL: SIGNIFICANT CHANGE UP
GAS PNL BLDV: 138 MMOL/L — SIGNIFICANT CHANGE UP (ref 136–145)
GAS PNL BLDV: SIGNIFICANT CHANGE UP
GAS PNL BLDV: SIGNIFICANT CHANGE UP
GLUCOSE SERPL-MCNC: 78 MG/DL — SIGNIFICANT CHANGE UP (ref 70–99)
HCO3 BLDV-SCNC: 30 MMOL/L — HIGH (ref 22–29)
HCT VFR BLD CALC: 30.9 % — LOW (ref 37–47)
HCT VFR BLDA CALC: 29 % — LOW (ref 39–51)
HGB BLD CALC-MCNC: 9.8 G/DL — LOW (ref 12.6–17.4)
HGB BLD-MCNC: 9.9 G/DL — LOW (ref 12–16)
LACTATE BLDV-MCNC: 1.3 MMOL/L — SIGNIFICANT CHANGE UP (ref 0.5–2)
LIDOCAIN IGE QN: 32 U/L — SIGNIFICANT CHANGE UP (ref 7–60)
LYMPHOCYTES # BLD AUTO: 4.54 K/UL — HIGH (ref 1.2–3.4)
LYMPHOCYTES # BLD AUTO: 5.8 % — LOW (ref 20.5–51.1)
MANUAL SMEAR VERIFICATION: SIGNIFICANT CHANGE UP
MCHC RBC-ENTMCNC: 30.4 PG — SIGNIFICANT CHANGE UP (ref 27–31)
MCHC RBC-ENTMCNC: 32 G/DL — SIGNIFICANT CHANGE UP (ref 32–37)
MCV RBC AUTO: 94.8 FL — SIGNIFICANT CHANGE UP (ref 81–99)
METAMYELOCYTES # FLD: 10.8 % — HIGH (ref 0–0)
MONOCYTES # BLD AUTO: 9.17 K/UL — HIGH (ref 0.1–0.6)
MONOCYTES NFR BLD AUTO: 11.7 % — HIGH (ref 1.7–9.3)
MYELOCYTES NFR BLD: 3.3 % — HIGH (ref 0–0)
NEUTROPHILS # BLD AUTO: 45.13 K/UL — HIGH (ref 1.4–6.5)
NEUTROPHILS NFR BLD AUTO: 44.2 % — SIGNIFICANT CHANGE UP (ref 42.2–75.2)
NEUTS BAND # BLD: 13.4 % — HIGH (ref 0–6)
NRBC # BLD: 1 /100 — HIGH (ref 0–0)
NRBC # BLD: SIGNIFICANT CHANGE UP /100 WBCS (ref 0–0)
NT-PROBNP SERPL-SCNC: 485 PG/ML — HIGH (ref 0–300)
PCO2 BLDV: 51 MMHG — HIGH (ref 39–42)
PH BLDV: 7.38 — SIGNIFICANT CHANGE UP (ref 7.32–7.43)
PLAT MORPH BLD: NORMAL — SIGNIFICANT CHANGE UP
PLATELET # BLD AUTO: 210 K/UL — SIGNIFICANT CHANGE UP (ref 130–400)
PO2 BLDV: 37 MMHG — SIGNIFICANT CHANGE UP
POLYCHROMASIA BLD QL SMEAR: SLIGHT — SIGNIFICANT CHANGE UP
POTASSIUM BLDV-SCNC: 3.9 MMOL/L — SIGNIFICANT CHANGE UP (ref 3.5–5.1)
POTASSIUM SERPL-MCNC: 4.1 MMOL/L — SIGNIFICANT CHANGE UP (ref 3.5–5)
POTASSIUM SERPL-SCNC: 4.1 MMOL/L — SIGNIFICANT CHANGE UP (ref 3.5–5)
PROMYELOCYTES # FLD: 7.5 % — HIGH (ref 0–0)
PROT SERPL-MCNC: 6.4 G/DL — SIGNIFICANT CHANGE UP (ref 6–8)
RBC # BLD: 3.26 M/UL — LOW (ref 4.2–5.4)
RBC # FLD: 15.6 % — HIGH (ref 11.5–14.5)
RBC BLD AUTO: ABNORMAL
RSV RNA NPH QL NAA+NON-PROBE: SIGNIFICANT CHANGE UP
SAO2 % BLDV: 61.1 % — SIGNIFICANT CHANGE UP
SARS-COV-2 RNA SPEC QL NAA+PROBE: SIGNIFICANT CHANGE UP
SODIUM SERPL-SCNC: 143 MMOL/L — SIGNIFICANT CHANGE UP (ref 135–146)
TROPONIN T SERPL-MCNC: <0.01 NG/ML — SIGNIFICANT CHANGE UP
VARIANT LYMPHS # BLD: 3.3 % — SIGNIFICANT CHANGE UP (ref 0–5)
WBC # BLD: 78.35 K/UL — CRITICAL HIGH (ref 4.8–10.8)
WBC # FLD AUTO: 78.35 K/UL — CRITICAL HIGH (ref 4.8–10.8)

## 2023-03-09 PROCEDURE — 82330 ASSAY OF CALCIUM: CPT

## 2023-03-09 PROCEDURE — 96375 TX/PRO/DX INJ NEW DRUG ADDON: CPT | Mod: XU

## 2023-03-09 PROCEDURE — 85025 COMPLETE CBC W/AUTO DIFF WBC: CPT

## 2023-03-09 PROCEDURE — 93005 ELECTROCARDIOGRAM TRACING: CPT

## 2023-03-09 PROCEDURE — 0241U: CPT

## 2023-03-09 PROCEDURE — 85014 HEMATOCRIT: CPT

## 2023-03-09 PROCEDURE — 99285 EMERGENCY DEPT VISIT HI MDM: CPT

## 2023-03-09 PROCEDURE — 80053 COMPREHEN METABOLIC PANEL: CPT

## 2023-03-09 PROCEDURE — 74177 CT ABD & PELVIS W/CONTRAST: CPT | Mod: 26,MA

## 2023-03-09 PROCEDURE — 93010 ELECTROCARDIOGRAM REPORT: CPT

## 2023-03-09 PROCEDURE — 84295 ASSAY OF SERUM SODIUM: CPT

## 2023-03-09 PROCEDURE — 71045 X-RAY EXAM CHEST 1 VIEW: CPT | Mod: 26

## 2023-03-09 PROCEDURE — 83605 ASSAY OF LACTIC ACID: CPT

## 2023-03-09 PROCEDURE — 71275 CT ANGIOGRAPHY CHEST: CPT | Mod: MA

## 2023-03-09 PROCEDURE — 82803 BLOOD GASES ANY COMBINATION: CPT

## 2023-03-09 PROCEDURE — 36415 COLL VENOUS BLD VENIPUNCTURE: CPT

## 2023-03-09 PROCEDURE — 96374 THER/PROPH/DIAG INJ IV PUSH: CPT | Mod: XU

## 2023-03-09 PROCEDURE — 84484 ASSAY OF TROPONIN QUANT: CPT

## 2023-03-09 PROCEDURE — 84132 ASSAY OF SERUM POTASSIUM: CPT

## 2023-03-09 PROCEDURE — 74177 CT ABD & PELVIS W/CONTRAST: CPT | Mod: MA

## 2023-03-09 PROCEDURE — 99285 EMERGENCY DEPT VISIT HI MDM: CPT | Mod: 25

## 2023-03-09 PROCEDURE — 83880 ASSAY OF NATRIURETIC PEPTIDE: CPT

## 2023-03-09 PROCEDURE — 83690 ASSAY OF LIPASE: CPT

## 2023-03-09 PROCEDURE — 71275 CT ANGIOGRAPHY CHEST: CPT | Mod: 26,MA

## 2023-03-09 PROCEDURE — 85018 HEMOGLOBIN: CPT

## 2023-03-09 PROCEDURE — 71045 X-RAY EXAM CHEST 1 VIEW: CPT

## 2023-03-09 RX ORDER — DIPHENHYDRAMINE HCL 50 MG
25 CAPSULE ORAL ONCE
Refills: 0 | Status: COMPLETED | OUTPATIENT
Start: 2023-03-09 | End: 2023-03-09

## 2023-03-09 RX ORDER — DIPHENHYDRAMINE HCL 50 MG
25 CAPSULE ORAL EVERY 4 HOURS
Refills: 0 | Status: DISCONTINUED | OUTPATIENT
Start: 2023-03-09 | End: 2023-03-09

## 2023-03-09 RX ADMIN — Medication 125 MILLIGRAM(S): at 16:16

## 2023-03-09 RX ADMIN — Medication 25 MILLIGRAM(S): at 16:30

## 2023-03-09 NOTE — ED PROVIDER NOTE - CLINICAL SUMMARY MEDICAL DECISION MAKING FREE TEXT BOX
62-year-old female no past medical history presents with abdominal pain distention sent in by oncologist no nausea vomiting no fevers.  Well appearing, NAD, non toxic. NCAT PERRLA EOMI neck supple non tender normal wob cta bl rrr abdomen s nt diffuse distention no rebound no guarding WWPx4 neuro non focal Labs EKG reviewed no obvious abnormalities no pulmonary embolism possible polyp urinary bladder we will discharge with follow-up. Labs and EKG were ordered and reviewed.  Imaging was ordered and reviewed by me.  Appropriate medications for patient's presenting complaints were ordered and effects were reassessed.  Patient's records (prior hospital, ED visit, and/or nursing home notes if available) were reviewed.  Additional history was obtained from EMS, family, and/or PCP (where available).  Escalation to admission/observation was considered. However patient feels much better and is comfortable with discharge.  Appropriate follow-up was arranged.

## 2023-03-09 NOTE — ED PROVIDER NOTE - NSFOLLOWUPINSTRUCTIONS_ED_ALL_ED_FT
please follow up with your heme onc doctor with the results of your bloodwork and ct scans    Abdominal Pain        Many things can cause abdominal pain. Usually, abdominal pain is not caused by a disease and will improve without treatment. Your health care provider will do a physical exam and possibly order blood tests and imaging to help determine the seriousness of your pain. However, in many cases, no cause may be found and you may need further testing as an outpatient. Monitor your abdominal pain for any changes.     SEEK IMMEDIATE MEDICAL CARE IF YOU HAVE THE FOLLOWING SYMPTOMS: worsening abdominal pain, vomiting, diarrhea, inability to have bowel movements or pass gas, black or bloody stool, fever accompanying chest pain or back pain, or dizziness/lightheadedness.    Chest Pain    Chest pain can be caused by many different conditions which may or may not be dangerous. Causes include heartburn, lung infections, heart attack, blood clot in lungs, skin infections, strain or damage to muscle, cartilage, or bones, etc. In addition to a history and physical examination, an electrocardiogram (ECG) or other lab tests may have been performed to determine the cause of your chest pain. Follow up with your primary care provider or with a cardiologist as instructed.     SEEK IMMEDIATE MEDICAL CARE IF YOU HAVE ANY OF THE FOLLOWING SYMPTOMS: worsening chest pain, coughing up blood, unexplained back/neck/jaw pain, severe abdominal pain, dizziness or lightheadedness, fainting, shortness of breath, sweaty or clammy skin, vomiting, or racing heart beat. These symptoms may represent a serious problem that is an emergency. Do not wait to see if the symptoms will go away. Get medical help right away. Call 911 and do not drive yourself to the hospital.

## 2023-03-09 NOTE — ED ADULT NURSE NOTE - OBJECTIVE STATEMENT
pt came in c/o sob and abd distention that started around 3days ago. pt ha hx of breast CA and is currently on chemo. has been having fevers and unable tolay flat due to sob. no ss of resp distress

## 2023-03-09 NOTE — ED ADULT NURSE NOTE - NSIMPLEMENTINTERV_GEN_ALL_ED
Implemented All Fall with Harm Risk Interventions:  [place] to call system. Call bell, personal items and telephone within reach. Instruct patient to call for assistance. Room bathroom lighting operational. Non-slip footwear when patient is off stretcher. Physically safe environment: no spills, clutter or unnecessary equipment. Stretcher in lowest position, wheels locked, appropriate side rails in place. Provide visual cue, wrist band, yellow gown, etc. Monitor gait and stability. Monitor for mental status changes and reorient to person, place, and time. Review medications for side effects contributing to fall risk. Reinforce activity limits and safety measures with patient and family. Provide visual clues: red socks.

## 2023-03-09 NOTE — ED PROVIDER NOTE - PATIENT PORTAL LINK FT
You can access the FollowMyHealth Patient Portal offered by [facility] by registering at the following website: [email or web address]. By joining [employer]’s FollowMyHealth portal, you will also be able to view your health information using other applications (apps) compatible with our system.

## 2023-03-09 NOTE — ED ADULT TRIAGE NOTE - CHIEF COMPLAINT QUOTE
Pt here with abdominal distension worsening over past few days. pt reportsd sent in by oncologist. denies n/v/fevers

## 2023-03-09 NOTE — ED PROVIDER NOTE - OBJECTIVE STATEMENT
rolvedon--> taking it rolvedon--> taking it   62-year-old female no past medical history presents with abdominal pain distention sent in by oncologist no nausea vomiting no fevers.

## 2023-03-09 NOTE — ED PROVIDER NOTE - PHYSICAL EXAMINATION
CONSTITUTIONAL: Well-developed; well-nourished; in no acute distress.   SKIN: warm, dry  HEAD: Normocephalic; atraumatic.  EYES: PERRL, EOMI, no conjunctival erythema  ENT: No nasal discharge; airway clear.  NECK: Supple; non tender.  CARD: S1, S2 normal; no murmurs, gallops, or rubs. tachycardic   RESP: No wheezes, rales or rhonchi.  ABD: soft nt + distended  EXT: Normal ROM.  No clubbing, cyanosis or edema.   LYMPH: No acute cervical adenopathy.  NEURO: Alert, oriented, grossly unremarkable  PSYCH: Cooperative, appropriate.

## 2023-03-28 ENCOUNTER — APPOINTMENT (OUTPATIENT)
Dept: PULMONOLOGY | Facility: CLINIC | Age: 63
End: 2023-03-28
Payer: COMMERCIAL

## 2023-03-28 VITALS
HEART RATE: 116 BPM | WEIGHT: 155 LBS | DIASTOLIC BLOOD PRESSURE: 80 MMHG | HEIGHT: 64 IN | RESPIRATION RATE: 14 BRPM | SYSTOLIC BLOOD PRESSURE: 130 MMHG | BODY MASS INDEX: 26.46 KG/M2 | OXYGEN SATURATION: 96 %

## 2023-03-28 PROCEDURE — 99214 OFFICE O/P EST MOD 30 MIN: CPT

## 2023-03-28 NOTE — HISTORY OF PRESENT ILLNESS
[TextBox_4] : Overall she was doing good had a COVID infection in February admitted to the hospital status post remdesivir had a chest x-ray and CT scan of the chest which I reviewed the result was normal no acute lung pathology patient did had fever at that time and was complaining of cough.\par Now she is back to her baseline no more cough no wheezing and no shortness of breath her postnasal drip nasal congestion almost also resolved currently she is on Advair every 12 hours\par GI note reviewed also

## 2023-03-28 NOTE — REVIEW OF SYSTEMS
[Nasal Congestion] : nasal congestion [Postnasal Drip] : postnasal drip [Cough] : no cough [Wheezing] : no wheezing [Negative] : Endocrine

## 2023-04-04 NOTE — ED ADULT NURSE NOTE - SEPSIS REFERENCE DATA CRITERIA 1
no lesions, no deformities, no traumatic injuries, no significant scars are present, chest wall non-tender, no masses present, breathing is unlabored without accessory muscle use,normal breath sounds Abormal VS: Temp > 100F or < 96.8F; SBP < 90 mmHG; HR > 120bpm; Resp > 24/min

## 2023-04-21 NOTE — ED ADULT NURSE NOTE - NSFALLRSKPASTHIST_ED_ALL_ED
"Subjective   Patient ID: Marybel Blum is a 9 y.o. female who presents for Abdominal Pain (Stomach ache for a few months that comes and goes/No nausea or vomiting/No diarrhea or constipation).  HPI  In am more often  Intermittent  Trouble urinating  No vtg  Sometimes hard to poop  No blood  No fever  Fruits and vegetables some  Drinks a lot of milk  Am and pm 2-3 glasses  Water does well  Has menses   Pain not assoc with this    Review of Systems   Constitutional:  Negative for activity change.   HENT:  Negative for congestion.    Respiratory:  Negative for shortness of breath.    Cardiovascular:  Negative for chest pain.   Gastrointestinal:  Positive for abdominal pain and constipation. Negative for blood in stool, diarrhea and rectal pain.   Endocrine: Negative for polyuria.   Genitourinary:  Negative for difficulty urinating.   Musculoskeletal:  Negative for arthralgias.   Neurological:  Negative for weakness and headaches.   Psychiatric/Behavioral:  Negative for behavioral problems.        /60   Pulse 78   Temp 36.5 °C (97.7 °F) (Tympanic)   Ht 1.473 m (4' 10\")   Wt 40.8 kg   SpO2 98%   BMI 18.81 kg/m²   Objective   Physical Exam  HENT:      Head: Normocephalic.      Right Ear: Tympanic membrane normal.      Left Ear: Tympanic membrane normal.      Mouth/Throat:      Mouth: Mucous membranes are moist.      Pharynx: Oropharynx is clear.   Cardiovascular:      Rate and Rhythm: Normal rate and regular rhythm.      Heart sounds: Normal heart sounds.   Pulmonary:      Effort: Pulmonary effort is normal.      Breath sounds: Normal breath sounds.   Abdominal:      General: Abdomen is flat. Bowel sounds are normal. There is distension.      Palpations: Abdomen is soft.      Tenderness: There is no abdominal tenderness.   Genitourinary:     Comments: Rodney 4   Musculoskeletal:      Cervical back: Normal range of motion.   Skin:     General: Skin is warm and dry.   Neurological:      General: No focal " GLUCOSE 86 01/16/2020    CALCIUM 9.4 01/16/2020     01/16/2020    K 3.4 01/16/2020    CO2 26 01/16/2020     01/16/2020    BUN 6 01/16/2020    CREATININE 0.40 01/16/2020     Social History     Socioeconomic History    Marital status: Single     Spouse name: Not on file    Number of children: Not on file    Years of education: Not on file    Highest education level: Not on file   Occupational History    Not on file   Tobacco Use    Smoking status: Not on file    Smokeless tobacco: Not on file   Vaping Use    Vaping status: Not on file   Substance and Sexual Activity    Alcohol use: Not on file    Drug use: Not on file    Sexual activity: Not on file   Other Topics Concern    Not on file   Social History Narrative    Not on file     Social Determinants of Health     Financial Resource Strain: Not on file   Food Insecurity: Not on file   Transportation Needs: Not on file   Physical Activity: Not on file   Housing Stability: Not on file     No family history on file.    Assessment/Plan   Marybel was seen today for abdominal pain.  Diagnoses and all orders for this visit:  Generalized abdominal pain (Primary)  Comments:  constipation, vs milk intolerance vs lactose intolerance  christian as ordered  diet diary  dec milk to max one per day for now  Orders:  -     Comprehensive Metabolic Panel; Future  -     Vitamin D, Total; Future  -     CBC and Auto Differential; Future  -     Sedimentation Rate; Future  -     C-reactive protein; Future  -     Food Allergy Profile IgE; Future  -     XR abdomen child; Future  -     Celiac Panel; Future  -     Urine Culture; Future  -     Urinalysis with Reflex Microscopic; Future  -     XR  abdomen child    Diet changes discussed more to follow after christian   Fu one month/prn   no

## 2023-05-06 NOTE — ASU PATIENT PROFILE, ADULT - HAVE YOU RECEIVED AT LEAST TWO PFIZER AND/OR MODERNA VACCINATIONS (IN ANY COMBINATION) AND/OR ONE JOHNSON & JOHNSON VACCINATION?
Patient brought to the ER with complaints of right lower abdominal pain that started last night. Patient stated no vomiting. Patient had BM yesterday.
Yes

## 2023-07-13 ENCOUNTER — RESULT REVIEW (OUTPATIENT)
Age: 63
End: 2023-07-13

## 2023-07-13 ENCOUNTER — OUTPATIENT (OUTPATIENT)
Dept: OUTPATIENT SERVICES | Facility: HOSPITAL | Age: 63
LOS: 1 days | End: 2023-07-13
Payer: COMMERCIAL

## 2023-07-13 DIAGNOSIS — R92.8 OTHER ABNORMAL AND INCONCLUSIVE FINDINGS ON DIAGNOSTIC IMAGING OF BREAST: ICD-10-CM

## 2023-07-13 DIAGNOSIS — Z98.890 OTHER SPECIFIED POSTPROCEDURAL STATES: Chronic | ICD-10-CM

## 2023-07-13 DIAGNOSIS — Z85.3 PERSONAL HISTORY OF MALIGNANT NEOPLASM OF BREAST: ICD-10-CM

## 2023-07-13 PROCEDURE — 77066 DX MAMMO INCL CAD BI: CPT | Mod: 26

## 2023-07-13 PROCEDURE — 76641 ULTRASOUND BREAST COMPLETE: CPT | Mod: 26,50

## 2023-07-13 PROCEDURE — 76641 ULTRASOUND BREAST COMPLETE: CPT | Mod: 50

## 2023-07-13 PROCEDURE — G0279: CPT | Mod: 26

## 2023-07-13 PROCEDURE — G0279: CPT

## 2023-07-13 PROCEDURE — 77066 DX MAMMO INCL CAD BI: CPT

## 2023-07-14 ENCOUNTER — NON-APPOINTMENT (OUTPATIENT)
Age: 63
End: 2023-07-14

## 2023-07-14 DIAGNOSIS — R92.8 OTHER ABNORMAL AND INCONCLUSIVE FINDINGS ON DIAGNOSTIC IMAGING OF BREAST: ICD-10-CM

## 2023-07-20 ENCOUNTER — APPOINTMENT (OUTPATIENT)
Dept: BREAST CENTER | Facility: CLINIC | Age: 63
End: 2023-07-20
Payer: COMMERCIAL

## 2023-07-27 ENCOUNTER — APPOINTMENT (OUTPATIENT)
Dept: BREAST CENTER | Facility: CLINIC | Age: 63
End: 2023-07-27
Payer: COMMERCIAL

## 2023-07-27 VITALS
BODY MASS INDEX: 26.33 KG/M2 | WEIGHT: 158 LBS | HEIGHT: 65 IN | SYSTOLIC BLOOD PRESSURE: 115 MMHG | DIASTOLIC BLOOD PRESSURE: 69 MMHG

## 2023-07-27 PROCEDURE — 99214 OFFICE O/P EST MOD 30 MIN: CPT

## 2023-07-27 NOTE — HISTORY OF PRESENT ILLNESS
[FreeTextEntry1] : Patient is a 61F who presents with diagnosis of right breast cancer s/p WLE and SLNB on 12/2/22: 1.1cm IDC, 0/1node.  pT1c, pN(sn)0\par \par S/p adjuvant TC\par S/p right RT (completed on 6/2/23)\par \par FHx:Grandmother, endometrial cancer 54; Uncle esophageal cancer (agent orange) 54\par \par Her work up is as follows:\par 8/4/21:\par B dx MMG for follow up of bilateral breast asymmetries --> BIRADS 3\par Dense\par Stable asymmetries in both breasts, probably benign\par \par 3/14/22: \par B dx MMG and US --> BIRADS 3\par Dense\par Stable asymmetries in both breasts\par US: Right 4-5 o'clock periareolar 0.3 x 0.3 x 0.3 cm cyst\par \par 10/25/22:\par B dx mmg and R US --> BIRADS 4\par Dense\par MMG: stable asymmetries in central right and left breast; Developing mass central right breast approx 1cm\par US: Right 2N3 mass with indistinct margins 1 x 0.8 x 0.8cm --> rec Bx\par \par 10/28/22:\par USGB\par  Invasive poorly differentiated ductal carcinoma with calcifications and\par scattered single cell necrosis.\par - Ductal carcinoma in situ (DCIS), solid type, high nuclear grade.\par ER-, AR-, HER 2 -\par ([place])\par \par 11/10/22: B US --> BIRADS 6\par R 2N3 biopsy proven canrcinoma\par L 12N1 sub cm cyst, benign\par \par 11/22/22: MRI --> BIRADS 6\par R 1.4 x 1.2 x 0.8 cm mass in central right breast, biopsy proven cancer\par \par 12/2/22: R WLE + SLNB\par - Healing prior biopsy site with invasive poorly differentiated ductal carcinoma, 1.1 cm (microscopic measurement), with medullary features (a dominant syncytial-type growth pattern, a largely pseudo-circumscribed pushing tumor border, necrosis, and an associated diffuse chronic lymphoplasmacytic cell inflammatory infiltrate).\par - Non-extensive ductal carcinoma in situ (DCIS), solid type with comedonecrosis and calcifications, high nuclear grade.\par - No lymphovascular invasion is seen.\par - A hyalinized fibroadenoma, a small radial sclerosing lesion (radial scar)\par - All margins negative\par - One (1) benign lymph node (0/1).  Negative for carcinoma \par - AJCC 8th Edition Pathologic Stage: pT1c, pN(sn)0, pMx.\par \par S/p adjuvant TC\par S/p right RT (completed on 6/2/23)\par \par Her most recent imaging is as follows:\par 07/13/2023 b/l dx mammo and US-->BIRADS2\par -breasts are heterogeneously dense\par -anticipated postsurgical distortion in the right breast upper inner quadrant. \par -Additional stable bilateral asymmetries are benign\par Right breast:\par Postsurgical seroma also noted at the lumpectomy site in the 3:00 position 3 cm from the nipple. \par Left breast:\par At the 12:00 position 4 cm from the nipple, there is a benign subcentimeter cyst.\par Recommendation: As per post lumpectomy routine, a follow-up unilateral right mammogram is recommended.\par \par Denies any current complaints. No new changes to her breasts.

## 2023-07-27 NOTE — DATA REVIEWED
[FreeTextEntry1] : ACC: [id number]     EXAM:  MG US BREAST COMPLETE BI   ORDERED BY: ERENDIRA DOMÍNGUEZ\par \par ACC: [id number]     EXAM:  MG MAMMO DIAG W ANABELLA BI#   ORDERED BY: ERENDIRA DOMÍNGUEZ\par \par PROCEDURE DATE:  07/13/2023\par \par \par \par INTERPRETATION:  Clinical History / Reason for exam: History of right breast cancer status post lumpectomy in December 2022\par \par The patient reports her last clinical breast examination was performed within the past year.\par \par Family history: None\par \par Comparisons: Priors dating back to 2017.\par \par Views obtained:Bilateral full field CC and MLO views tomosynthesis. Spot magnification views of the right breast.\par \par Computer-aided detection was utilized in the interpretation of this examination.\par \par Breast composition:The breasts are heterogeneously dense, which may obscure small masses.\par \par Findings:\par \par Mammogram:\par \par There is anticipated postsurgical distortion in the right breast upper inner quadrant. Additional stable bilateral asymmetries are benign. No suspicious mass, microcalcifications or areas of architectural distortion is seen either breast. When compared to the previous examinations there is no significant interval change and nothing to suggest malignancy.\par \par Ultrasound:\par \par Bilateral whole breast ultrasound was performed.\par \par Right breast:\par Postsurgical seroma also noted at the lumpectomy site in the 3:00 position 3 cm from the nipple. No additional solid or cystic masses. No axillary adenopathy.\par \par Left breast:\par At the 12:00 position 4 cm from the nipple, there is a benign subcentimeter cyst. No axillary adenopathy.\par \par Impression: No mammographic or sonographic evidence of malignancy. Postlumpectomy changes of the right breast.\par \par Recommendation: As per post lumpectomy routine, a follow-up unilateral right mammogram is recommended.\par \par BI-RADS Category 2: Benign\par \par

## 2023-07-27 NOTE — ASSESSMENT
[FreeTextEntry1] : Patient is a 61F with right IDC (-/-/-) who underwent right WLE and SLNB on 12/2/22 with findings of IDC (1.1cm). Negative margins. 0/1 node.  She underwent adjuvant TC and radiation which was completed on 6/2/23.  She most recently had bilateral mmg/us in 7/2023 which showed right breast post surgical changes and left sub centimeter cyst (BIRADS 2) with recommendation for right imaging in 6 months as per lumpectomy protocol.  We discussed her cancer treatment in detail. We discussed the importance of following up with medical and radiation oncology.  We discussed breast density. Increasing breast density has been found to increase ones risk of breast cancer, but at this time, there is no clear indication for additional imaging in this setting, as both US and MRI have not been found to improve survival. One can consider bilateral screening US. However, out of 1000 women screened, the use of routine US will only identify an additional 3-5 cancers. The use of US was found to increase the likelihood of undergoing more imaging and more biopsies. She does have dense breasts. We have decided to proceed with screening bilateral breast US in the future with her screening mammograms.  All questions and concerns were answered in detail.  Patient is for right mmg/US in 1/2024.  She will follow up with me after her imaging, pending any interval changes.  Patient is to follow up with medical oncology as scheduled.  Patient is to follow up with radiation oncology as scheduled.  Total time spent on encounter was greater than 30 minutes , which included face to face time with the patient, performing an exam, reviewing previous medical records, reviewing current imaging/ pathology, documenting in patient record and coordinating care/imaging. Greater than 50% of the encounter was spent on counseling and coordination of her breast issue.

## 2023-07-27 NOTE — PHYSICAL EXAM
[Normocephalic] : normocephalic [EOMI] : extra ocular movement intact [No Supraclavicular Adenopathy] : no supraclavicular adenopathy [No Cervical Adenopathy] : no cervical adenopathy [No dominant masses] : no dominant masses in right breast  [No dominant masses] : no dominant masses left breast [No Axillary Lymphadenopathy] : no left axillary lymphadenopathy [de-identified] : Nl respirations [de-identified] : Post radiation skin changes [de-identified] : Right breast post radiation skin changes

## 2023-08-09 NOTE — ED ADULT TRIAGE NOTE - MODE OF ARRIVAL
EMS
Photo Preface (Leave Blank If You Do Not Want): Photographs were obtained today
Detail Level: Zone

## 2023-08-11 RX ORDER — FAMOTIDINE 40 MG/1
40 TABLET, FILM COATED ORAL
Qty: 30 | Refills: 6 | Status: DISCONTINUED | COMMUNITY
Start: 2021-11-16 | End: 2023-08-11

## 2023-08-11 RX ORDER — FLUTICASONE PROPIONATE AND SALMETEROL 250; 50 UG/1; UG/1
250-50 POWDER RESPIRATORY (INHALATION)
Qty: 3 | Refills: 3 | Status: DISCONTINUED | COMMUNITY
Start: 2023-03-28 | End: 2023-08-11

## 2023-08-11 RX ORDER — ESOMEPRAZOLE MAGNESIUM 20 MG/1
20 CAPSULE, DELAYED RELEASE ORAL
Refills: 0 | Status: DISCONTINUED | COMMUNITY
End: 2023-08-11

## 2023-08-16 ENCOUNTER — APPOINTMENT (OUTPATIENT)
Dept: GASTROENTEROLOGY | Facility: CLINIC | Age: 63
End: 2023-08-16
Payer: COMMERCIAL

## 2023-08-16 DIAGNOSIS — K63.5 POLYP OF COLON: ICD-10-CM

## 2023-08-16 DIAGNOSIS — K21.9 GASTRO-ESOPHAGEAL REFLUX DISEASE W/OUT ESOPHAGITIS: ICD-10-CM

## 2023-08-16 PROCEDURE — 99214 OFFICE O/P EST MOD 30 MIN: CPT | Mod: 95

## 2023-08-16 NOTE — PHYSICAL EXAM
[Alert] : alert [Sclera] : the sclera and conjunctiva were normal [Hearing Threshold Finger Rub Not [place]] : hearing was normal [Normal Appearance] : the appearance of the neck was normal [No Respiratory Distress] : no respiratory distress [Normal Color / Pigmentation] : normal skin color and pigmentation [Oriented To Time, Place, And Person] : oriented to person, place, and time

## 2023-08-16 NOTE — HISTORY OF PRESENT ILLNESS
[FreeTextEntry1] : 62 year old female patient average risk for CRC, fam hx of esophageal cancer, personal hx of colon TAs, hx of IBS and bloating, hx of colonoscopy in 2020 had 1 TA removed, and EGD in 2020 with grade 1 esophagitis and non HP gastritis.  No weight loss, blood in the stools or night symptoms.  Newly diagnosed with breast cancer s/p radiation and chemotherapy June 2023,  Pt had hx of heartburn, no dysphagia, no nausea or vomiting, no weight loss or abdominal pain or change in bowel habits.

## 2023-08-16 NOTE — REASON FOR VISIT
[Home] : at home, [unfilled] , at the time of the visit. [Other Location: e.g. Home (Enter Location, City,State)___] : at [unfilled] [Patient] : the patient [FreeTextEntry4] : Sylvia Rutherford  [FreeTextEntry1] : Diarrhea - now resolved

## 2023-08-16 NOTE — ASSESSMENT
[FreeTextEntry1] : 62 year old female patient average risk for CRC, fam hx of esophageal cancer, personal hx of colon TAs, hx of IBS and bloating, hx of colonoscopy in 2020 had 1 TA removed, and EGD in 2020 with grade 1 esophagitis and non HP gastritis.  No weight loss, blood in the stools or night symptoms.  Newly diagnosed with breast cancer s/p radiation and chemotherapy June 2023,  Pt had hx of heartburn, no dysphagia, no nausea or vomiting, no weight loss or abdominal pain or change in bowel habits.    GERD, resolving on famotidine 40 at [facility] Refilled.   Hx of colon TAs next surveillance colonoscopy in 2025 Risks and benefits discussed with patient.

## 2023-09-12 ENCOUNTER — APPOINTMENT (OUTPATIENT)
Dept: PULMONOLOGY | Facility: CLINIC | Age: 63
End: 2023-09-12

## 2023-10-10 ENCOUNTER — APPOINTMENT (OUTPATIENT)
Dept: PULMONOLOGY | Facility: CLINIC | Age: 63
End: 2023-10-10
Payer: COMMERCIAL

## 2023-10-10 VITALS
DIASTOLIC BLOOD PRESSURE: 68 MMHG | OXYGEN SATURATION: 94 % | RESPIRATION RATE: 14 BRPM | WEIGHT: 160 LBS | SYSTOLIC BLOOD PRESSURE: 112 MMHG | HEART RATE: 78 BPM | HEIGHT: 65 IN | BODY MASS INDEX: 26.66 KG/M2

## 2023-10-10 PROCEDURE — 99213 OFFICE O/P EST LOW 20 MIN: CPT | Mod: 25

## 2023-10-10 PROCEDURE — 94010 BREATHING CAPACITY TEST: CPT

## 2023-10-10 PROCEDURE — 94727 GAS DIL/WSHOT DETER LNG VOL: CPT

## 2023-10-10 PROCEDURE — 94729 DIFFUSING CAPACITY: CPT

## 2023-10-11 RX ORDER — FLUTICASONE PROPIONATE AND SALMETEROL 50; 500 UG/1; UG/1
POWDER RESPIRATORY (INHALATION)
Refills: 0 | Status: DISCONTINUED | COMMUNITY
End: 2023-10-11

## 2023-11-24 RX ORDER — FAMOTIDINE 40 MG/1
40 TABLET, FILM COATED ORAL
Qty: 1 | Refills: 6 | Status: ACTIVE | COMMUNITY
Start: 2023-08-16 | End: 1900-01-01

## 2023-11-27 ENCOUNTER — OUTPATIENT (OUTPATIENT)
Dept: OUTPATIENT SERVICES | Facility: HOSPITAL | Age: 63
LOS: 1 days | End: 2023-11-27
Payer: COMMERCIAL

## 2023-11-27 DIAGNOSIS — Z98.890 OTHER SPECIFIED POSTPROCEDURAL STATES: Chronic | ICD-10-CM

## 2023-11-27 DIAGNOSIS — Z00.8 ENCOUNTER FOR OTHER GENERAL EXAMINATION: ICD-10-CM

## 2023-11-27 DIAGNOSIS — Z13.820 ENCOUNTER FOR SCREENING FOR OSTEOPOROSIS: ICD-10-CM

## 2023-11-27 PROCEDURE — 77080 DXA BONE DENSITY AXIAL: CPT

## 2023-11-28 DIAGNOSIS — Z13.820 ENCOUNTER FOR SCREENING FOR OSTEOPOROSIS: ICD-10-CM

## 2024-01-02 ENCOUNTER — OUTPATIENT (OUTPATIENT)
Dept: OUTPATIENT SERVICES | Facility: HOSPITAL | Age: 64
LOS: 1 days | End: 2024-01-02
Payer: COMMERCIAL

## 2024-01-02 DIAGNOSIS — Z98.890 OTHER SPECIFIED POSTPROCEDURAL STATES: Chronic | ICD-10-CM

## 2024-01-02 DIAGNOSIS — Z00.8 ENCOUNTER FOR OTHER GENERAL EXAMINATION: ICD-10-CM

## 2024-01-02 DIAGNOSIS — M51.26 OTHER INTERVERTEBRAL DISC DISPLACEMENT, LUMBAR REGION: ICD-10-CM

## 2024-01-02 PROCEDURE — 72148 MRI LUMBAR SPINE W/O DYE: CPT

## 2024-01-02 PROCEDURE — 72148 MRI LUMBAR SPINE W/O DYE: CPT | Mod: 26

## 2024-01-03 DIAGNOSIS — M51.26 OTHER INTERVERTEBRAL DISC DISPLACEMENT, LUMBAR REGION: ICD-10-CM

## 2024-01-12 RX ORDER — FLUTICASONE PROPIONATE AND SALMETEROL 50; 250 UG/1; UG/1
250-50 POWDER RESPIRATORY (INHALATION)
Qty: 3 | Refills: 3 | Status: ACTIVE | COMMUNITY
Start: 1900-01-01 | End: 1900-01-01

## 2024-01-17 ENCOUNTER — RESULT REVIEW (OUTPATIENT)
Age: 64
End: 2024-01-17

## 2024-01-17 ENCOUNTER — OUTPATIENT (OUTPATIENT)
Dept: OUTPATIENT SERVICES | Facility: HOSPITAL | Age: 64
LOS: 1 days | End: 2024-01-17
Payer: COMMERCIAL

## 2024-01-17 DIAGNOSIS — R92.8 OTHER ABNORMAL AND INCONCLUSIVE FINDINGS ON DIAGNOSTIC IMAGING OF BREAST: ICD-10-CM

## 2024-01-17 DIAGNOSIS — Z98.890 OTHER SPECIFIED POSTPROCEDURAL STATES: Chronic | ICD-10-CM

## 2024-01-17 PROCEDURE — G0279: CPT

## 2024-01-17 PROCEDURE — 76642 ULTRASOUND BREAST LIMITED: CPT | Mod: 26,RT

## 2024-01-17 PROCEDURE — 77061 BREAST TOMOSYNTHESIS UNI: CPT | Mod: 26,RT

## 2024-01-17 PROCEDURE — 77065 DX MAMMO INCL CAD UNI: CPT | Mod: 26,RT

## 2024-01-17 PROCEDURE — 76642 ULTRASOUND BREAST LIMITED: CPT | Mod: RT

## 2024-01-17 PROCEDURE — 77065 DX MAMMO INCL CAD UNI: CPT | Mod: RT

## 2024-01-17 PROCEDURE — G0279: CPT | Mod: 26

## 2024-01-18 DIAGNOSIS — R92.8 OTHER ABNORMAL AND INCONCLUSIVE FINDINGS ON DIAGNOSTIC IMAGING OF BREAST: ICD-10-CM

## 2024-01-23 NOTE — DATA REVIEWED
[FreeTextEntry1] : [id number]     EXAM:   US BREAST LIMITED RT    ACC: [id number]     EXAM:   MAMMO DIAG W ANABELLA RT#     01/17/2024 INTERPRETATION:  Clinical History / Reason for exam: History of right breast cancer status post lumpectomy in December 2022  The patient reports her last clinical breast examination was performed within the past year.  Family history: None  Comparisons: Priors dating back to 2021.  Views obtained:Full-field CC and MLO views with tomosynthesis of the right breast. Spot magnification views of the right breast.  Computer-aided detection was utilized in the interpretation of this examination.  Breast composition:The breasts are heterogeneously dense, which may obscure small masses.  Findings:  Mammogram:  Postsurgical distortion and fat necrosis type calcifications in the medial right breast consistent with postlumpectomy changes. No suspicious mass, microcalcifications or areas of architectural distortion is seen the right breast. When compared to the previous examinations there is no significant interval change and nothing to suggest malignancy.  Ultrasound:  Targeted unilateral right breast ultrasound was performed.  At the 2:00 position 3 cm from the nipple, there is an area of fat necrosis measuring 1.0 x 1.1 x 1.3 cm.  Impression: No mammographic or sonographic evidence of malignancy. Postlumpectomy changes of the right breast.  Recommendation: As per post lumpectomy routine, a follow-up bilateral mammogram is recommended.  BI-RADS Category 2: Benign

## 2024-01-23 NOTE — HISTORY OF PRESENT ILLNESS
[FreeTextEntry1] : Patient is a 63F who presents with right breast cancer (-/-/-) s/p WLE and SLNB on 12/2/22: 1.1cm IDC, 0/1node.  pT1c, pN(sn)0  S/p adjuvant TC S/p right RT (completed on 6/2/23)  FHx: Grandmother, endometrial cancer 54; Uncle esophageal cancer (agent orange) 54  Her work up is as follows: 8/4/21: B dx MMG for follow up of bilateral breast asymmetries --> BIRADS 3 Dense Stable asymmetries in both breasts, probably benign  3/14/22:  B dx MMG and US --> BIRADS 3 Dense Stable asymmetries in both breasts US: Right 4-5 o'clock periareolar 0.3 x 0.3 x 0.3 cm cyst  10/25/22: B dx mmg and R US --> BIRADS 4 Dense MMG: stable asymmetries in central right and left breast; Developing mass central right breast approx 1cm US: Right 2N3 mass with indistinct margins 1 x 0.8 x 0.8cm --> rec Bx  10/28/22: USGB  Invasive poorly differentiated ductal carcinoma with calcifications and scattered single cell necrosis. - Ductal carcinoma in situ (DCIS), solid type, high nuclear grade. ER-, [place]-, HER 2 - ([place])  11/10/22: B US --> BIRADS 6 R 2N3 biopsy proven cancinoma L 12N1 sub cm cyst, benign  11/22/22: MRI --> BIRADS 6 R 1.4 x 1.2 x 0.8 cm mass in central right breast, biopsy proven cancer  12/2/22: R WLE + SLNB - Healing prior biopsy site with invasive poorly differentiated ductal carcinoma, 1.1 cm (microscopic measurement), with medullary features (a dominant syncytial-type growth pattern, a largely pseudo-circumscribed pushing tumor border, necrosis, and an associated diffuse chronic lymphoplasmacytic cell inflammatory infiltrate). - Non-extensive ductal carcinoma in situ (DCIS), solid type with comedonecrosis and calcifications, high nuclear grade. - No lymphovascular invasion is seen. - A hyalinized fibroadenoma, a small radial sclerosing lesion (radial scar) - All margins negative - One (1) benign lymph node (0/1).  Negative for carcinoma  - AJCC 8th Edition Pathologic Stage: pT1c, pN(sn)0, pMx.  S/p adjuvant TC S/p right RT (completed on 6/2/23)  Her most recent imaging is as follows: 07/13/2023 b/l dx mammo and US-->BIRADS2 -breasts are heterogeneously dense -anticipated postsurgical distortion in the right breast upper inner quadrant.  -Additional stable bilateral asymmetries are benign Right breast: Postsurgical seroma also noted at the lumpectomy site in the 3:00 position 3 cm from the nipple.  Left breast: At the 12:00 position 4 cm from the nipple, there is a benign subcentimeter cyst. Recommendation: As per post lumpectomy routine, a follow-up unilateral right mammogram is recommended.  01/17/2024: RIGHT dx mammo and US--> BIRADS 2 - breasts are heterogeneously dense -Postsurgical distortion and fat necrosis type calcifications in the medial right breast consistent with postlumpectomy changes. right us:  -@2n 3  an area of fat necrosis measuring 1.0 x 1.1 x 1.3 cm Recommendation: As per post lumpectomy routine, a follow-up bilateral mammogram is recommended.

## 2024-01-25 ENCOUNTER — NON-APPOINTMENT (OUTPATIENT)
Age: 64
End: 2024-01-25

## 2024-01-25 ENCOUNTER — APPOINTMENT (OUTPATIENT)
Dept: BREAST CENTER | Facility: CLINIC | Age: 64
End: 2024-01-25
Payer: COMMERCIAL

## 2024-01-25 VITALS
DIASTOLIC BLOOD PRESSURE: 66 MMHG | HEIGHT: 65 IN | WEIGHT: 158 LBS | BODY MASS INDEX: 26.33 KG/M2 | SYSTOLIC BLOOD PRESSURE: 138 MMHG

## 2024-01-25 DIAGNOSIS — C50.211 MALIGNANT NEOPLASM OF UPPER-INNER QUADRANT OF RIGHT FEMALE BREAST: ICD-10-CM

## 2024-01-25 DIAGNOSIS — Z98.890 OTHER SPECIFIED POSTPROCEDURAL STATES: ICD-10-CM

## 2024-01-25 DIAGNOSIS — Z91.89 OTHER SPECIFIED PERSONAL RISK FACTORS, NOT ELSEWHERE CLASSIFIED: ICD-10-CM

## 2024-01-25 DIAGNOSIS — Z17.1 MALIGNANT NEOPLASM OF UPPER-INNER QUADRANT OF RIGHT FEMALE BREAST: ICD-10-CM

## 2024-01-25 DIAGNOSIS — R92.30 DENSE BREASTS, UNSPECIFIED: ICD-10-CM

## 2024-01-25 PROCEDURE — 93702 BIS XTRACELL FLUID ANALYSIS: CPT | Mod: NC

## 2024-01-25 PROCEDURE — 99214 OFFICE O/P EST MOD 30 MIN: CPT

## 2024-01-29 NOTE — ED ADULT TRIAGE NOTE - NSSEPSISSUSPECTED_ED_A_ED
Yes [Fatigue] : fatigue [Postnasal Drip] : postnasal drip [Shortness Of Breath] : shortness of breath [Negative] : Heme/Lymph

## 2024-01-31 RX ORDER — ALBUTEROL SULFATE 90 UG/1
108 (90 BASE) INHALANT RESPIRATORY (INHALATION)
Qty: 1 | Refills: 2 | Status: DISCONTINUED | COMMUNITY
Start: 2023-04-03 | End: 2024-01-31

## 2024-01-31 RX ORDER — PANTOPRAZOLE 40 MG/1
40 TABLET, DELAYED RELEASE ORAL
Qty: 90 | Refills: 2 | Status: DISCONTINUED | COMMUNITY
Start: 2023-01-19 | End: 2024-01-31

## 2024-01-31 RX ORDER — AZELASTINE HYDROCHLORIDE 137 UG/1
0.1 SPRAY, METERED NASAL DAILY
Qty: 5 | Refills: 0 | Status: DISCONTINUED | COMMUNITY
Start: 1900-01-01 | End: 2024-01-31

## 2024-01-31 RX ORDER — FAMOTIDINE 40 MG/1
40 TABLET, FILM COATED ORAL
Qty: 1 | Refills: 6 | Status: DISCONTINUED | COMMUNITY
Start: 2023-01-19 | End: 2024-01-31

## 2024-01-31 RX ORDER — ALBUTEROL SULFATE 90 UG/1
108 (90 BASE) POWDER, METERED RESPIRATORY (INHALATION) EVERY 6 HOURS
Qty: 1 | Refills: 3 | Status: DISCONTINUED | COMMUNITY
Start: 2021-11-08 | End: 2024-01-31

## 2024-02-06 ENCOUNTER — APPOINTMENT (OUTPATIENT)
Dept: GASTROENTEROLOGY | Facility: CLINIC | Age: 64
End: 2024-02-06
Payer: COMMERCIAL

## 2024-02-06 DIAGNOSIS — R10.9 UNSPECIFIED ABDOMINAL PAIN: ICD-10-CM

## 2024-02-06 DIAGNOSIS — R19.8 OTHER SPECIFIED SYMPTOMS AND SIGNS INVOLVING THE DIGESTIVE SYSTEM AND ABDOMEN: ICD-10-CM

## 2024-02-06 DIAGNOSIS — R11.0 NAUSEA: ICD-10-CM

## 2024-02-06 DIAGNOSIS — R14.0 ABDOMINAL DISTENSION (GASEOUS): ICD-10-CM

## 2024-02-06 DIAGNOSIS — K21.9 GASTRO-ESOPHAGEAL REFLUX DISEASE W/OUT ESOPHAGITIS: ICD-10-CM

## 2024-02-06 PROCEDURE — 99214 OFFICE O/P EST MOD 30 MIN: CPT

## 2024-02-06 RX ORDER — ESOMEPRAZOLE MAGNESIUM 20 MG/1
20 CAPSULE, DELAYED RELEASE ORAL
Refills: 0 | Status: ACTIVE | COMMUNITY

## 2024-02-06 RX ORDER — ESOMEPRAZOLE MAGNESIUM 40 MG/1
40 CAPSULE, DELAYED RELEASE ORAL
Qty: 30 | Refills: 6 | Status: ACTIVE | COMMUNITY
Start: 2024-02-06 | End: 1900-01-01

## 2024-02-06 RX ORDER — FAMOTIDINE 40 MG/1
40 TABLET, FILM COATED ORAL
Qty: 1 | Refills: 6 | Status: ACTIVE | COMMUNITY
Start: 2024-02-06 | End: 1900-01-01

## 2024-02-06 NOTE — PHYSICAL EXAM
[Alert] : alert [Normal Voice/Communication] : normal voice/communication [No Acute Distress] : no acute distress [Well Developed] : well developed [Sclera] : the sclera and conjunctiva were normal [None] : no edema [Normal] : normal bowel sounds, non-tender, no masses, soft, no no hepato-splenomegaly [Abnormal Walk] : normal gait [Normal Color / Pigmentation] : normal skin color and pigmentation [Oriented To Time, Place, And Person] : oriented to person, place, and time

## 2024-02-06 NOTE — REVIEW OF SYSTEMS
[As Noted in HPI] : as noted in HPI [Abdominal Pain] : abdominal pain [Constipation] : constipation [Diarrhea] : diarrhea [Negative] : Heme/Lymph [Bloating (gassiness)] : bloating [Vomiting] : no vomiting [Heartburn] : no heartburn [Melena (black stool)] : no melena [Bleeding] : no bleeding [Fecal Incontinence (soiling)] : no fecal incontinence

## 2024-02-06 NOTE — HISTORY OF PRESENT ILLNESS
[FreeTextEntry1] : 62 year old female patient average risk for CRC, fam hx of esophageal cancer, personal hx of colon TAs, hx of IBS and bloating, hx of colonoscopy in 2020 had 1 TA removed, and EGD in 2020 with grade 1 esophagitis and non HP gastritis. Now taking Nexium 20 mg in am, Pepcid 20 mg at hs with increased nausea over the past month without vomiting. She takes Mylanta prn with little relief. She also C/O epigastric pain described as a pressure, postprandial (ie chocolate) and occurring about 3 times a week. She takes Miralax QOD prn for chronic constipation and takes OTC suppositories with good response. She was experiencing diarrhea x 10 days until 3 weeks ago, was associated with lower abdominal cramping. She reported decreased bloating recently.  No weight loss, blood in the stools. She wakes up with nausea that is relieved by Nexium.  Newly diagnosed with breast cancer s/p radiation and chemotherapy June 2023, completed RT and Chemo 7/23 Pt had hx of heartburn, no dysphagia, no weight loss.

## 2024-02-06 NOTE — ASSESSMENT
[FreeTextEntry1] : 62 year old female patient average risk for CRC, fam hx of esophageal cancer, personal hx of colon TAs, hx of IBS and bloating, hx of colonoscopy in 2020 had 1 TA removed, and EGD in 2020 with grade 1 esophagitis and non HP gastritis. Now taking Nexium 20 mg in am, Pepcid 20 mg at hs with increased nausea over the past month without vomiting. She takes Mylanta prn with little relief. She also C/O epigastric pain described as a pressure, postprandial (ie chocolate) and occurring about 3 times a week. She takes Miralax QOD prn for chronic constipation and takes OTC suppositories with good response. She was experiencing diarrhea x 10 days until 3 weeks ago, was associated with lower abdominal cramping. She reported decreased bloating recently.  No weight loss, blood in the stools. She wakes up with nausea that is relieved by Nexium.  Newly diagnosed with breast cancer s/p radiation and chemotherapy June 2023, completed RT and Chemo 7/23 Pt had hx of heartburn, no dysphagia, no weight loss.  Chronic GERD, now with worsening nausea and upper abd postpranidal pain r/o gallstones/ gastric pathologies Rec: US of Abdomen Increase Nexium to 40 mg in am and famotidine 40 mg at hs Pt instructed to call the office if not better on meds in 2 months, to schedule an EGD Pt to call back for US result when done  Constipation Miralax caused increased bloating She was told to take Senokot 2 at hs, can also add Colace 3 100 mg tabs a day to regimen   CRC screening colonoscopy due in 2025

## 2024-02-16 NOTE — PATIENT PROFILE ADULT - HAVE YOU RECEIVED AT LEAST TWO PFIZER AND/OR MODERNA VACCINATIONS (IN ANY COMBINATION) AND/OR ONE JOHNSON & JOHNSON VACCINATION?
Telephoned Alie Arceo and name,  verified with patient. Notified Alie Arceo of right breast negative for malignancy biopsy result. [facility] pending. Alie Arceo reports biopsy site is healing well. Patient instructed to follow up with her physician for future breast imaging recommendations. Pt verbalized understanding and had no further questions at this time.    Yes

## 2024-02-21 ENCOUNTER — OUTPATIENT (OUTPATIENT)
Dept: OUTPATIENT SERVICES | Facility: HOSPITAL | Age: 64
LOS: 1 days | End: 2024-02-21
Payer: COMMERCIAL

## 2024-02-21 ENCOUNTER — RESULT REVIEW (OUTPATIENT)
Age: 64
End: 2024-02-21

## 2024-02-21 DIAGNOSIS — Z98.890 OTHER SPECIFIED POSTPROCEDURAL STATES: Chronic | ICD-10-CM

## 2024-02-21 DIAGNOSIS — Z00.8 ENCOUNTER FOR OTHER GENERAL EXAMINATION: ICD-10-CM

## 2024-02-21 DIAGNOSIS — R10.9 UNSPECIFIED ABDOMINAL PAIN: ICD-10-CM

## 2024-02-21 PROCEDURE — 76700 US EXAM ABDOM COMPLETE: CPT | Mod: 26

## 2024-02-21 PROCEDURE — 76700 US EXAM ABDOM COMPLETE: CPT

## 2024-02-22 DIAGNOSIS — R10.9 UNSPECIFIED ABDOMINAL PAIN: ICD-10-CM

## 2024-03-07 ENCOUNTER — NON-APPOINTMENT (OUTPATIENT)
Age: 64
End: 2024-03-07

## 2024-04-22 ENCOUNTER — APPOINTMENT (OUTPATIENT)
Dept: PULMONOLOGY | Facility: CLINIC | Age: 64
End: 2024-04-22
Payer: COMMERCIAL

## 2024-04-22 VITALS
OXYGEN SATURATION: 98 % | DIASTOLIC BLOOD PRESSURE: 82 MMHG | HEIGHT: 65 IN | SYSTOLIC BLOOD PRESSURE: 112 MMHG | HEART RATE: 80 BPM | BODY MASS INDEX: 26.33 KG/M2 | WEIGHT: 158 LBS

## 2024-04-22 DIAGNOSIS — R09.81 NASAL CONGESTION: ICD-10-CM

## 2024-04-22 DIAGNOSIS — J45.909 UNSPECIFIED ASTHMA, UNCOMPLICATED: ICD-10-CM

## 2024-04-22 PROCEDURE — 99214 OFFICE O/P EST MOD 30 MIN: CPT

## 2024-04-22 RX ORDER — ALBUTEROL SULFATE 90 UG/1
108 (90 BASE) INHALANT RESPIRATORY (INHALATION)
Qty: 1 | Refills: 3 | Status: ACTIVE | COMMUNITY
Start: 2024-04-22 | End: 1900-01-01

## 2024-04-22 NOTE — HISTORY OF PRESENT ILLNESS
[TextBox_4] : Patient coming for follow-up said she was doing good with the Advair every 12 hours still complaining of runny nose postnasal drip and a lot of she had like a viral infection months ago seen by her PMD said had a chest x-ray told normal given prednisone for 1 week now she is back to her baseline no wheeze no shortness of breath still have mild intermittent cough her postnasal drip improved but not resolved she is not using the azelastine currently only using Nasonex

## 2024-04-22 NOTE — PHYSICAL EXAM
[No Acute Distress] : no acute distress [Normal Oropharynx] : normal oropharynx [Normal Appearance] : normal appearance [No Neck Mass] : no neck mass [Normal Rate/Rhythm] : normal rate/rhythm [Normal S1, S2] : normal s1, s2 [No Murmurs] : no murmurs [No Resp Distress] : no resp distress [Clear to Auscultation Bilaterally] : clear to auscultation bilaterally [No Abnormalities] : no abnormalities [No Clubbing] : no clubbing [No Cyanosis] : no cyanosis [No Edema] : no edema [FROM] : FROM [No Focal Deficits] : no focal deficits [Oriented x3] : oriented x3 [Normal Affect] : normal affect

## 2024-04-22 NOTE — PLAN
[TextEntry] : Told to resume azelastine at night and to take the Nasonex in the morning Continue Advair every 12 hours PFT next visit

## 2024-07-22 ENCOUNTER — RESULT REVIEW (OUTPATIENT)
Age: 64
End: 2024-07-22

## 2024-07-30 ENCOUNTER — APPOINTMENT (OUTPATIENT)
Dept: BREAST CENTER | Facility: CLINIC | Age: 64
End: 2024-07-30
Payer: COMMERCIAL

## 2024-07-30 DIAGNOSIS — Z17.1 MALIGNANT NEOPLASM OF UPPER-INNER QUADRANT OF RIGHT FEMALE BREAST: ICD-10-CM

## 2024-07-30 DIAGNOSIS — C50.211 MALIGNANT NEOPLASM OF UPPER-INNER QUADRANT OF RIGHT FEMALE BREAST: ICD-10-CM

## 2024-07-30 DIAGNOSIS — R92.30 DENSE BREASTS, UNSPECIFIED: ICD-10-CM

## 2024-07-30 DIAGNOSIS — Z98.890 OTHER SPECIFIED POSTPROCEDURAL STATES: ICD-10-CM

## 2024-07-30 PROCEDURE — 93702 BIS XTRACELL FLUID ANALYSIS: CPT | Mod: NC

## 2024-07-30 PROCEDURE — 99214 OFFICE O/P EST MOD 30 MIN: CPT

## 2024-07-30 PROCEDURE — G2211 COMPLEX E/M VISIT ADD ON: CPT | Mod: NC

## 2024-07-30 NOTE — PHYSICAL EXAM
[Normocephalic] : normocephalic [EOMI] : extra ocular movement intact [No Supraclavicular Adenopathy] : no supraclavicular adenopathy [No Cervical Adenopathy] : no cervical adenopathy [Examined in the supine and seated position] : examined in the supine and seated position [No dominant masses] : no dominant masses in right breast  [No dominant masses] : no dominant masses left breast [No Nipple Retraction] : no left nipple retraction [No Nipple Discharge] : no left nipple discharge [No Axillary Lymphadenopathy] : no left axillary lymphadenopathy [No Rashes] : no rashes [No Ulceration] : no ulceration [de-identified] : NL respirations

## 2024-07-30 NOTE — DATA REVIEWED
[FreeTextEntry1] :  [id number]     EXAM:  MG US BREAST COMPLETE RT   ORDERED BY: ERENDIRA DOMÍNGUEZ  ACC: [id number]     EXAM:  MG MAMMO DIAG W ANABELLA BI#   ORDERED BY: ERENDIRA DOMÍNGUEZ  PROCEDURE DATE:  07/22/2024    INTERPRETATION:  Clinical History / Reason for exam: Patient presents for follow-up as she is status post a right lumpectomy in December 2022.  The patient reports her last clinical breast examination was performed within the past year.  Diagnostic bilateral mammogram including tomography with spot magnification views of the right lumpectomy site were performed and submitted for evaluation.  Computer-aided detection was utilized in the interpretation of this examination.  Comparison is made to the prior studies dated 2021.  Breast composition:The breasts are heterogeneously dense, which may obscure small masses.  The patient is status post a right lumpectomy in December 2022 with stable architectural distortion most consistent with postsurgical change.  No suspicious masses or abnormal groups of microcalcifications are seen.  Whole Right Breast Sonogram:  In the region of patient's right lumpectomy site, there is a small seroma measuring 0.9 cm x 1.0 cm x 0.9 cm.  No suspicious lesions are seen in the right breast.  Evaluation of both axillary regions demonstrates normal-appearing lymph nodes.  Impression: Status post a right lumpectomy in December 2022 with stable architectural distortion most consistent with postsurgical change/seroma.  No evidence of malignancy.  Recommendation: As per post lumpectomy routine, a follow-up unilateral right mammogram is recommended.  BI-RADS Category 2: Benign

## 2024-07-30 NOTE — ASSESSMENT
[FreeTextEntry1] : Patient is a 63F with right IDC (-/-/-) who underwent right WLE and SLNB on 12/2/22 with findings of IDC (1.1cm). Negative margins. 0/1 node.  She underwent adjuvant TC and radiation which was completed on 6/2/23.  She had bilateral mmg/us in 7/2023 which showed right breast post surgical changes and left sub centimeter cyst (BIRADS 2) with recommendation for right imaging in 6 months as per lumpectomy protocol.  She underwent right mmg/us in 1/2024 which showed post surgical chnages and 2N3 1.3cm fat necrosis (BIRADS 2).  She had bilateral mmg.right US in 7/2024 which showed right breast post surgical changes and small seroma (BIRADS 2).  We discussed her treatment in detail. We discussed the importance of following up with medical and radiation oncology.  We discussed her most recent imaging was benign.  We discussed breast density. Increasing breast density has been found to increase ones risk of breast cancer, but at this time, there is no clear indication for additional imaging in this setting, as both US and MRI have not been found to improve survival. One can consider bilateral screening US. However, out of 1000 women screened, the use of routine US will only identify an additional 3-5 cancers. The use of US was found to increase the likelihood of undergoing more imaging and more biopsies. She does have dense breasts.  Bio-impedance testing performed.  All questions and concerns were answered in detail.  Patient is for right mmg/us in 1/2025.  She will follow up with me after her imaging, pending any interval changes.  Patient is to follow up with medical oncology as scheduled.  Patient is to follow up with radiation oncology as scheduled.  Sozo performed.  Total time spent on encounter was greater than 30 minutes , which included face to face time with the patient, performing an exam, reviewing previous medical records, reviewing current imaging/ pathology, documenting in patient record and coordinating care/imaging. Greater than 50% of the encounter was spent on counseling and coordination of her breast issue.

## 2024-07-30 NOTE — DATA REVIEWED
[FreeTextEntry1] :  [id number]     EXAM:  MG US BREAST COMPLETE RT   ORDERED BY: ERENDIRA DOMÍNGUEZ  ACC: [id number]     EXAM:  MG MAMMO DIAG W ANABELLA BI#   ORDERED BY: ERENDIRA DOMÍNGUEZ  PROCEDURE DATE:  07/22/2024    INTERPRETATION:  Clinical History / Reason for exam: Patient presents for follow-up as she is status post a right lumpectomy in December 2022.  The patient reports her last clinical breast examination was performed within the past year.  Diagnostic bilateral mammogram including tomography with spot magnification views of the right lumpectomy site were performed and submitted for evaluation.  Computer-aided detection was utilized in the interpretation of this examination.  Comparison is made to the prior studies dated 2021.  Breast composition:The breasts are heterogeneously dense, which may obscure small masses.  The patient is status post a right lumpectomy in December 2022 with stable architectural distortion most consistent with postsurgical change.  No suspicious masses or abnormal groups of microcalcifications are seen.  Whole Right Breast Sonogram:  In the region of patient's right lumpectomy site, there is a small seroma measuring 0.9 cm x 1.0 cm x 0.9 cm.  No suspicious lesions are seen in the right breast.  Evaluation of both axillary regions demonstrates normal-appearing lymph nodes.  Impression: Status post a right lumpectomy in December 2022 with stable architectural distortion most consistent with postsurgical change/seroma.  No evidence of malignancy.  Recommendation: As per post lumpectomy routine, a follow-up unilateral right mammogram is recommended.  BI-RADS Category 2: Benign

## 2024-07-30 NOTE — HISTORY OF PRESENT ILLNESS
[FreeTextEntry1] : Patient is a 63F who presents with right breast cancer (-/-/-) s/p WLE and SLNB on 12/2/22: 1.1cm IDC, 0/1node.  pT1c, pN(sn)0  S/p adjuvant TC S/p right RT (completed on 6/2/23)  FHx: Grandmother, endometrial cancer 54; Uncle esophageal cancer (agent orange) 54  Her work up is as follows: 8/4/21: B dx MMG for follow up of bilateral breast asymmetries --> BIRADS 3 Dense Stable asymmetries in both breasts, probably benign  3/14/22:  B dx MMG and US --> BIRADS 3 Dense Stable asymmetries in both breasts US: Right 4-5 o'clock periareolar 0.3 x 0.3 x 0.3 cm cyst  10/25/22: B dx mmg and R US --> BIRADS 4 Dense MMG: stable asymmetries in central right and left breast; Developing mass central right breast approx 1cm US: Right 2N3 mass with indistinct margins 1 x 0.8 x 0.8cm --> rec Bx  10/28/22: USGB  Invasive poorly differentiated ductal carcinoma with calcifications and scattered single cell necrosis. - Ductal carcinoma in situ (DCIS), solid type, high nuclear grade. [place]-, [place]-, HER 2 - ([place])  11/10/22: B US --> BIRADS 6 R 2N3 biopsy proven cancinoma L 12N1 sub cm cyst, benign  11/22/22: MRI --> BIRADS 6 R 1.4 x 1.2 x 0.8 cm mass in central right breast, biopsy proven cancer  12/2/22: R WLE + SLNB - Healing prior biopsy site with invasive poorly differentiated ductal carcinoma, 1.1 cm (microscopic measurement), with medullary features (a dominant syncytial-type growth pattern, a largely pseudo-circumscribed pushing tumor border, necrosis, and an associated diffuse chronic lymphoplasmacytic cell inflammatory infiltrate). - Non-extensive ductal carcinoma in situ (DCIS), solid type with comedonecrosis and calcifications, high nuclear grade. - No lymphovascular invasion is seen. - A hyalinized fibroadenoma, a small radial sclerosing lesion (radial scar) - All margins negative - One (1) benign lymph node (0/1).  Negative for carcinoma  - AJCC 8th Edition Pathologic Stage: pT1c, pN(sn)0, pMx.  S/p adjuvant TC S/p right RT (completed on 6/2/23)  Her most recent imaging is as follows: 07/13/2023 b/l dx mammo and US-->BIRADS2 -breasts are heterogeneously dense -anticipated postsurgical distortion in the right breast upper inner quadrant.  -Additional stable bilateral asymmetries are benign Right breast: Postsurgical seroma also noted at the lumpectomy site in the 3:00 position 3 cm from the nipple.  Left breast: At the 12:00 position 4 cm from the nipple, there is a benign subcentimeter cyst. Recommendation: As per post lumpectomy routine, a follow-up unilateral right mammogram is recommended.  01/17/2024: RIGHT dx mammo and US--> BIRADS 2 - breasts are heterogeneously dense -Postsurgical distortion and fat necrosis type calcifications in the medial right breast consistent with postlumpectomy changes. right us:  -@2n 3  an area of fat necrosis measuring 1.0 x 1.1 x 1.3 cm Recommendation: As per post lumpectomy routine, a follow-up bilateral mammogram is recommended.   07/22/2024 b/l dx mammo and right US-->birads2  breasts are heterogeneously dense status post a right lumpectomy in December 2022 with stable architectural distortion most consistent with postsurgical change In the region of patient's right lumpectomy site, there is a small seroma measuring 0.9 cm x 1.0 cm x 0.9 cm.  Denies any current complaints. No new changes to her breasts.

## 2024-07-30 NOTE — PHYSICAL EXAM
[Normocephalic] : normocephalic [EOMI] : extra ocular movement intact [No Supraclavicular Adenopathy] : no supraclavicular adenopathy [No Cervical Adenopathy] : no cervical adenopathy [Examined in the supine and seated position] : examined in the supine and seated position [No dominant masses] : no dominant masses in right breast  [No dominant masses] : no dominant masses left breast [No Nipple Retraction] : no left nipple retraction [No Nipple Discharge] : no left nipple discharge [No Axillary Lymphadenopathy] : no left axillary lymphadenopathy [No Rashes] : no rashes [No Ulceration] : no ulceration [de-identified] : NL respirations

## 2024-07-30 NOTE — HISTORY OF PRESENT ILLNESS
[FreeTextEntry1] : Patient is a 63F who presents with right breast cancer (-/-/-) s/p WLE and SLNB on 12/2/22: 1.1cm IDC, 0/1node.  pT1c, pN(sn)0  S/p adjuvant TC S/p right RT (completed on 6/2/23)  FHx: Grandmother, endometrial cancer 54; Uncle esophageal cancer (agent orange) 54  Her work up is as follows: 8/4/21: B dx MMG for follow up of bilateral breast asymmetries --> BIRADS 3 Dense Stable asymmetries in both breasts, probably benign  3/14/22:  B dx MMG and US --> BIRADS 3 Dense Stable asymmetries in both breasts US: Right 4-5 o'clock periareolar 0.3 x 0.3 x 0.3 cm cyst  10/25/22: B dx mmg and R US --> BIRADS 4 Dense MMG: stable asymmetries in central right and left breast; Developing mass central right breast approx 1cm US: Right 2N3 mass with indistinct margins 1 x 0.8 x 0.8cm --> rec Bx  10/28/22: USGB  Invasive poorly differentiated ductal carcinoma with calcifications and scattered single cell necrosis. - Ductal carcinoma in situ (DCIS), solid type, high nuclear grade. ER-, CO-, HER 2 - ([place])  11/10/22: B US --> BIRADS 6 R 2N3 biopsy proven cancinoma L 12N1 sub cm cyst, benign  11/22/22: MRI --> BIRADS 6 R 1.4 x 1.2 x 0.8 cm mass in central right breast, biopsy proven cancer  12/2/22: R WLE + SLNB - Healing prior biopsy site with invasive poorly differentiated ductal carcinoma, 1.1 cm (microscopic measurement), with medullary features (a dominant syncytial-type growth pattern, a largely pseudo-circumscribed pushing tumor border, necrosis, and an associated diffuse chronic lymphoplasmacytic cell inflammatory infiltrate). - Non-extensive ductal carcinoma in situ (DCIS), solid type with comedonecrosis and calcifications, high nuclear grade. - No lymphovascular invasion is seen. - A hyalinized fibroadenoma, a small radial sclerosing lesion (radial scar) - All margins negative - One (1) benign lymph node (0/1).  Negative for carcinoma  - AJCC 8th Edition Pathologic Stage: pT1c, pN(sn)0, pMx.  S/p adjuvant TC S/p right RT (completed on 6/2/23)  Her most recent imaging is as follows: 07/13/2023 b/l dx mammo and US-->BIRADS2 -breasts are heterogeneously dense -anticipated postsurgical distortion in the right breast upper inner quadrant.  -Additional stable bilateral asymmetries are benign Right breast: Postsurgical seroma also noted at the lumpectomy site in the 3:00 position 3 cm from the nipple.  Left breast: At the 12:00 position 4 cm from the nipple, there is a benign subcentimeter cyst. Recommendation: As per post lumpectomy routine, a follow-up unilateral right mammogram is recommended.  01/17/2024: RIGHT dx mammo and US--> BIRADS 2 - breasts are heterogeneously dense -Postsurgical distortion and fat necrosis type calcifications in the medial right breast consistent with postlumpectomy changes. right us:  -@2n 3  an area of fat necrosis measuring 1.0 x 1.1 x 1.3 cm Recommendation: As per post lumpectomy routine, a follow-up bilateral mammogram is recommended.   07/22/2024 b/l dx mammo and right US-->birads2  breasts are heterogeneously dense status post a right lumpectomy in December 2022 with stable architectural distortion most consistent with postsurgical change In the region of patient's right lumpectomy site, there is a small seroma measuring 0.9 cm x 1.0 cm x 0.9 cm.  Denies any current complaints. No new changes to her breasts.

## 2024-07-30 NOTE — DATA REVIEWED
[FreeTextEntry1] :  [id number]     EXAM:  MG US BREAST COMPLETE RT   ORDERED BY: ERENDIRA DOMÍNGUEZ  ACC: [id number]     EXAM:  MG MAMMO DIAG W ANABELLA BI#   ORDERED BY: ERENDIRA DOMÍNGUEZ  PROCEDURE DATE:  07/22/2024    INTERPRETATION:  Clinical History / Reason for exam: Patient presents for follow-up as she is status post a right lumpectomy in December 2022.  The patient reports her last clinical breast examination was performed within the past year.  Diagnostic bilateral mammogram including tomography with spot magnification views of the right lumpectomy site were performed and submitted for evaluation.  Computer-aided detection was utilized in the interpretation of this examination.  Comparison is made to the prior studies dated 2021.  Breast composition:The breasts are heterogeneously dense, which may obscure small masses.  The patient is status post a right lumpectomy in December 2022 with stable architectural distortion most consistent with postsurgical change.  No suspicious masses or abnormal groups of microcalcifications are seen.  Whole Right Breast Sonogram:  In the region of patient's right lumpectomy site, there is a small seroma measuring 0.9 cm x 1.0 cm x 0.9 cm.  No suspicious lesions are seen in the right breast.  Evaluation of both axillary regions demonstrates normal-appearing lymph nodes.  Impression: Status post a right lumpectomy in December 2022 with stable architectural distortion most consistent with postsurgical change/seroma.  No evidence of malignancy.  Recommendation: As per post lumpectomy routine, a follow-up unilateral right mammogram is recommended.  BI-RADS Category 2: Benign

## 2024-07-30 NOTE — PHYSICAL EXAM
[Normocephalic] : normocephalic [EOMI] : extra ocular movement intact [No Supraclavicular Adenopathy] : no supraclavicular adenopathy [No Cervical Adenopathy] : no cervical adenopathy [Examined in the supine and seated position] : examined in the supine and seated position [No dominant masses] : no dominant masses in right breast  [No dominant masses] : no dominant masses left breast [No Nipple Retraction] : no left nipple retraction [No Nipple Discharge] : no left nipple discharge [No Axillary Lymphadenopathy] : no left axillary lymphadenopathy [No Rashes] : no rashes [No Ulceration] : no ulceration [de-identified] : NL respirations

## 2024-07-30 NOTE — HISTORY OF PRESENT ILLNESS
[FreeTextEntry1] : Patient is a 63F who presents with right breast cancer (-/-/-) s/p WLE and SLNB on 12/2/22: 1.1cm IDC, 0/1node.  pT1c, pN(sn)0  S/p adjuvant TC S/p right RT (completed on 6/2/23)  FHx: Grandmother, endometrial cancer 54; Uncle esophageal cancer (agent orange) 54  Her work up is as follows: 8/4/21: B dx MMG for follow up of bilateral breast asymmetries --> BIRADS 3 Dense Stable asymmetries in both breasts, probably benign  3/14/22:  B dx MMG and US --> BIRADS 3 Dense Stable asymmetries in both breasts US: Right 4-5 o'clock periareolar 0.3 x 0.3 x 0.3 cm cyst  10/25/22: B dx mmg and R US --> BIRADS 4 Dense MMG: stable asymmetries in central right and left breast; Developing mass central right breast approx 1cm US: Right 2N3 mass with indistinct margins 1 x 0.8 x 0.8cm --> rec Bx  10/28/22: USGB  Invasive poorly differentiated ductal carcinoma with calcifications and scattered single cell necrosis. - Ductal carcinoma in situ (DCIS), solid type, high nuclear grade. ER-, [place]-, HER 2 - ([place])  11/10/22: B US --> BIRADS 6 R 2N3 biopsy proven cancinoma L 12N1 sub cm cyst, benign  11/22/22: MRI --> BIRADS 6 R 1.4 x 1.2 x 0.8 cm mass in central right breast, biopsy proven cancer  12/2/22: R WLE + SLNB - Healing prior biopsy site with invasive poorly differentiated ductal carcinoma, 1.1 cm (microscopic measurement), with medullary features (a dominant syncytial-type growth pattern, a largely pseudo-circumscribed pushing tumor border, necrosis, and an associated diffuse chronic lymphoplasmacytic cell inflammatory infiltrate). - Non-extensive ductal carcinoma in situ (DCIS), solid type with comedonecrosis and calcifications, high nuclear grade. - No lymphovascular invasion is seen. - A hyalinized fibroadenoma, a small radial sclerosing lesion (radial scar) - All margins negative - One (1) benign lymph node (0/1).  Negative for carcinoma  - AJCC 8th Edition Pathologic Stage: pT1c, pN(sn)0, pMx.  S/p adjuvant TC S/p right RT (completed on 6/2/23)  Her most recent imaging is as follows: 07/13/2023 b/l dx mammo and US-->BIRADS2 -breasts are heterogeneously dense -anticipated postsurgical distortion in the right breast upper inner quadrant.  -Additional stable bilateral asymmetries are benign Right breast: Postsurgical seroma also noted at the lumpectomy site in the 3:00 position 3 cm from the nipple.  Left breast: At the 12:00 position 4 cm from the nipple, there is a benign subcentimeter cyst. Recommendation: As per post lumpectomy routine, a follow-up unilateral right mammogram is recommended.  01/17/2024: RIGHT dx mammo and US--> BIRADS 2 - breasts are heterogeneously dense -Postsurgical distortion and fat necrosis type calcifications in the medial right breast consistent with postlumpectomy changes. right us:  -@2n 3  an area of fat necrosis measuring 1.0 x 1.1 x 1.3 cm Recommendation: As per post lumpectomy routine, a follow-up bilateral mammogram is recommended.   07/22/2024 b/l dx mammo and right US-->birads2  breasts are heterogeneously dense status post a right lumpectomy in December 2022 with stable architectural distortion most consistent with postsurgical change In the region of patient's right lumpectomy site, there is a small seroma measuring 0.9 cm x 1.0 cm x 0.9 cm.  Denies any current complaints. No new changes to her breasts.

## 2024-07-31 ENCOUNTER — APPOINTMENT (OUTPATIENT)
Dept: PULMONOLOGY | Facility: CLINIC | Age: 64
End: 2024-07-31
Payer: COMMERCIAL

## 2024-07-31 VITALS
HEART RATE: 78 BPM | WEIGHT: 158 LBS | DIASTOLIC BLOOD PRESSURE: 78 MMHG | SYSTOLIC BLOOD PRESSURE: 124 MMHG | RESPIRATION RATE: 14 BRPM | HEIGHT: 65 IN | BODY MASS INDEX: 26.33 KG/M2 | OXYGEN SATURATION: 96 %

## 2024-07-31 DIAGNOSIS — J45.901 UNSPECIFIED ASTHMA WITH (ACUTE) EXACERBATION: ICD-10-CM

## 2024-07-31 DIAGNOSIS — R09.81 NASAL CONGESTION: ICD-10-CM

## 2024-07-31 PROCEDURE — G2211 COMPLEX E/M VISIT ADD ON: CPT | Mod: NC

## 2024-07-31 PROCEDURE — 71046 X-RAY EXAM CHEST 2 VIEWS: CPT

## 2024-07-31 PROCEDURE — 99214 OFFICE O/P EST MOD 30 MIN: CPT | Mod: 25

## 2024-07-31 RX ORDER — ALBUTEROL SULFATE 2.5 MG/3ML
(2.5 MG/3ML) SOLUTION RESPIRATORY (INHALATION)
Qty: 1 | Refills: 5 | Status: ACTIVE | COMMUNITY
Start: 2024-07-31 | End: 1900-01-01

## 2024-07-31 RX ORDER — AZITHROMYCIN 250 MG/1
250 TABLET, FILM COATED ORAL
Qty: 1 | Refills: 0 | Status: ACTIVE | COMMUNITY
Start: 2024-07-31 | End: 1900-01-01

## 2024-07-31 RX ORDER — PREDNISONE 20 MG/1
20 TABLET ORAL
Qty: 9 | Refills: 0 | Status: ACTIVE | COMMUNITY
Start: 2024-07-31 | End: 1900-01-01

## 2024-07-31 NOTE — PLAN
[TextEntry] : continue adviar  azelastine at night nasal saline irrigation  prednisone taper  azithromycin  pending PFT

## 2024-07-31 NOTE — REVIEW OF SYSTEMS
[Nasal Congestion] : nasal congestion [Postnasal Drip] : postnasal drip [Cough] : cough [Dyspnea] : dyspnea [Wheezing] : wheezing [SOB on Exertion] : sob on exertion [Negative] : Endocrine

## 2024-07-31 NOTE — HISTORY OF PRESENT ILLNESS
[TextBox_4] : Patient came today acutely because of cough and wheezing.  She was doing good on Advair then some days she wake up congestion postnasal drip started to complain of cough Monday she started complaining of chest tightness wheezing in addition to dry cough and then became productive white to yellow in color Today she wake up in the morning wheezing and tightness she called 911 they came and they gave her nebulizer treatment

## 2024-07-31 NOTE — PROCEDURE
[FreeTextEntry1] : Reason : cough  Overall Findings:  View Ap and lateral     Lung Fields:  Normal Lung Markings                   No Infiltrate  Pleura:  No Pleural Effusions                        final Interpretation: no acute evidence of pulmonary disease

## 2024-07-31 NOTE — PHYSICAL EXAM
[No Acute Distress] : no acute distress [Normal Oropharynx] : normal oropharynx [Normal Appearance] : normal appearance [No Neck Mass] : no neck mass [Normal Rate/Rhythm] : normal rate/rhythm [Normal S1, S2] : normal s1, s2 [No Murmurs] : no murmurs [No Resp Distress] : no resp distress [Wheeze] : wheeze [No Abnormalities] : no abnormalities [No Clubbing] : no clubbing [No Cyanosis] : no cyanosis [No Edema] : no edema [FROM] : FROM [No Focal Deficits] : no focal deficits [Oriented x3] : oriented x3 [Normal Affect] : normal affect

## 2024-08-19 ENCOUNTER — EMERGENCY (EMERGENCY)
Facility: HOSPITAL | Age: 64
LOS: 0 days | Discharge: HOME | End: 2024-08-19
Attending: EMERGENCY MEDICINE
Payer: COMMERCIAL

## 2024-08-19 VITALS
WEIGHT: 160.06 LBS | HEART RATE: 95 BPM | DIASTOLIC BLOOD PRESSURE: 78 MMHG | OXYGEN SATURATION: 100 % | TEMPERATURE: 98 F | RESPIRATION RATE: 18 BRPM | HEIGHT: 65 IN | SYSTOLIC BLOOD PRESSURE: 122 MMHG

## 2024-08-19 DIAGNOSIS — Z91.041 RADIOGRAPHIC DYE ALLERGY STATUS: ICD-10-CM

## 2024-08-19 DIAGNOSIS — Z88.0 ALLERGY STATUS TO PENICILLIN: ICD-10-CM

## 2024-08-19 DIAGNOSIS — R06.00 DYSPNEA, UNSPECIFIED: ICD-10-CM

## 2024-08-19 DIAGNOSIS — J02.9 ACUTE PHARYNGITIS, UNSPECIFIED: ICD-10-CM

## 2024-08-19 DIAGNOSIS — Z91.018 ALLERGY TO OTHER FOODS: ICD-10-CM

## 2024-08-19 DIAGNOSIS — R09.82 POSTNASAL DRIP: ICD-10-CM

## 2024-08-19 DIAGNOSIS — J45.909 UNSPECIFIED ASTHMA, UNCOMPLICATED: ICD-10-CM

## 2024-08-19 DIAGNOSIS — Z98.890 OTHER SPECIFIED POSTPROCEDURAL STATES: Chronic | ICD-10-CM

## 2024-08-19 DIAGNOSIS — E05.90 THYROTOXICOSIS, UNSPECIFIED WITHOUT THYROTOXIC CRISIS OR STORM: ICD-10-CM

## 2024-08-19 DIAGNOSIS — Z85.3 PERSONAL HISTORY OF MALIGNANT NEOPLASM OF BREAST: ICD-10-CM

## 2024-08-19 DIAGNOSIS — K21.9 GASTRO-ESOPHAGEAL REFLUX DISEASE WITHOUT ESOPHAGITIS: ICD-10-CM

## 2024-08-19 DIAGNOSIS — Z20.822 CONTACT WITH AND (SUSPECTED) EXPOSURE TO COVID-19: ICD-10-CM

## 2024-08-19 LAB
FLUAV AG NPH QL: SIGNIFICANT CHANGE UP
FLUBV AG NPH QL: SIGNIFICANT CHANGE UP
RSV RNA NPH QL NAA+NON-PROBE: SIGNIFICANT CHANGE UP
SARS-COV-2 RNA SPEC QL NAA+PROBE: SIGNIFICANT CHANGE UP

## 2024-08-19 PROCEDURE — 0241U: CPT

## 2024-08-19 PROCEDURE — 71046 X-RAY EXAM CHEST 2 VIEWS: CPT | Mod: 26

## 2024-08-19 PROCEDURE — 71046 X-RAY EXAM CHEST 2 VIEWS: CPT

## 2024-08-19 PROCEDURE — 99283 EMERGENCY DEPT VISIT LOW MDM: CPT | Mod: 25

## 2024-08-19 PROCEDURE — 99284 EMERGENCY DEPT VISIT MOD MDM: CPT

## 2024-08-19 RX ORDER — DEXAMETHASONE 1.5 MG/1
10 TABLET ORAL ONCE
Refills: 0 | Status: COMPLETED | OUTPATIENT
Start: 2024-08-19 | End: 2024-08-19

## 2024-08-19 RX ORDER — DEXAMETHASONE 1.5 MG/1
10 TABLET ORAL ONCE
Refills: 0 | Status: DISCONTINUED | OUTPATIENT
Start: 2024-08-19 | End: 2024-08-19

## 2024-08-19 RX ADMIN — DEXAMETHASONE 10 MILLIGRAM(S): 1.5 TABLET ORAL at 03:42

## 2024-08-19 NOTE — ED PROVIDER NOTE - NSFOLLOWUPINSTRUCTIONS_ED_ALL_ED_FT
Make sure that you follow-up with your ENT appointment next week as scheduled.  Also follow-up with your primary care doctor within 1 to 2 weeks.  Continue taking the antacid medication as we discussed.  Return to the ER for any new or worsening feeling like you cannot breathe especially if associated with lightheadedness, swelling of the face or throat or mouth, passing out, change in color, chest pain or shortness of breath not associated with feeling like your throat is closing.    Our Emergency Department Referral Coordinators will be reaching out to you in the next 24-48 hours from 9:00am to 5:00pm to schedule a follow up appointment. Please expect a phone call from the hospital in that time frame. If you do not receive a call or if you have any questions or concerns, you can reach them at [phone] 206[facility].

## 2024-08-19 NOTE — ED PROVIDER NOTE - NSPTACCESSSVCSAPPTDETAILS_ED_ALL_ED_FT
Patient is called and she informs writer that she did  24 hour urine collection.  She is wondering if there is anything that she needs to abstain from.  Lab is called and technician informs of list of things recommended.    This list is sent to patient via [employer] portal.  She is to call with questions.   1-2 weeks throat discomfort

## 2024-08-19 NOTE — ED PROVIDER NOTE - PHYSICAL EXAMINATION
Physical Exam    Vital Signs: I have reviewed the initial vital signs.  Constitutional: appears stated age, no acute distress  Eyes: Conjunctiva pink, Sclera clear, EOMI.  ENT: OP is clear without exudates, unremarkable dentition, TMs clear b/l, nasal turbinates without erythema or discharge, no lymphadenopathy.  Cardiovascular: S1 and S2, regular rate, regular rhythm, well-perfused extremities, radial pulses equal and 2+, pedal pulses 2+ and equal  Respiratory: unlabored respiratory effort, clear to auscultation bilaterally no wheezing, rales, or rhonchi  Musculoskeletal: supple neck  Integumentary: warm, dry, no rash  Neurologic: awake, alert, oriented x3, extremities’ motor and sensory functions grossly intact

## 2024-08-19 NOTE — ED PROVIDER NOTE - CLINICAL SUMMARY MEDICAL DECISION MAKING FREE TEXT BOX
63-year-old female with a past medical history of asthma, breast cancer in remission,  GERD, presents with feeling that her throat was closing.  Woke her up from sleep.  States that she has had similar episodes in the past but never this severe.  Has been dealing with postnasal drip and following with pulmonology.  Referred to ENT but has not seen yet.  Denies any symptoms now.  On exam nontoxic, vital signs noted, airway intact, no stridor, oropharyngeal exam with no swelling or edema or exudates.  Lungs clear bilaterally, heart regular no murmurs.  No neck pain or tenderness or swelling.  Unclear etiology, clinically no concern for airway compromise at this time.  Consider  pharyngeal spasm/globus sensation.  Extensive discussion regarding no acute medical emergency noted at this time with the patient.  Strict return precautions discussed.  In my opinion, based on current evaluation and results, an acute medical or surgical emergency does not appear to be occurring at this time and I feel that the pt is stable for further outpatient work up and/or treatment.  Patient has close ENT follow-up.

## 2024-08-19 NOTE — ED PROVIDER NOTE - OBJECTIVE STATEMENT
63-year-old female with past medical history asthma (follows with Dr. Alejo), hyperthyroidism, breast cancer, GERD presents with complaints of sore throat and sensation of difficulty swallowing/breathing that occurred when she woke up from sleep.  States she believes this may be secondary to postnasal drip.  Reports she has been treated for an upper respiratory infection over the past 3 weeks with antibiotics, Astelin, expectorant, decongestant, Advair without significant relief.  Reports this sensation occurred tonight for about 15 minutes and subsided.  Denies fever/chills, chest pain, dyspnea on exertion, abdominal pain, nausea/vomiting/change in bowel/bladder habits, lightheadedness, dizziness.

## 2024-08-19 NOTE — ED PROVIDER NOTE - PATIENT PORTAL LINK FT
You can access the FollowMyHealth Patient Portal offered by [facility] by registering at the following website: [email or web address]. By joining [employer]’s FollowMyHealth portal, you will also be able to view your health information using other applications (apps) compatible with our system.

## 2024-08-20 ENCOUNTER — APPOINTMENT (OUTPATIENT)
Dept: OTOLARYNGOLOGY | Facility: CLINIC | Age: 64
End: 2024-08-20

## 2024-08-20 DIAGNOSIS — R49.0 DYSPHONIA: ICD-10-CM

## 2024-08-20 DIAGNOSIS — R13.10 DYSPHAGIA, UNSPECIFIED: ICD-10-CM

## 2024-08-20 DIAGNOSIS — K21.9 GASTRO-ESOPHAGEAL REFLUX DISEASE W/OUT ESOPHAGITIS: ICD-10-CM

## 2024-08-20 DIAGNOSIS — J38.5 LARYNGEAL SPASM: ICD-10-CM

## 2024-08-20 PROCEDURE — 31575 DIAGNOSTIC LARYNGOSCOPY: CPT

## 2024-08-20 PROCEDURE — 99204 OFFICE O/P NEW MOD 45 MIN: CPT | Mod: 25

## 2024-08-20 NOTE — PROCEDURE
[Globus] : globus [Flexible Endoscope] : examined with the flexible endoscope [Normal] : the nasopharynx was normal [Arytenoid Erythema ___ /4] : arytenoid erythema [unfilled]U/4 [de-identified] : lryngospasm

## 2024-08-20 NOTE — HISTORY OF PRESENT ILLNESS
[de-identified] : Patient presents today c/o chronic PND, acid reflux , chronic cough, hoarse voice.  Patient states Monday 2am she woke up with a seal like bark and felt like she could not breathe.   She went to the ED on 08//19/2024 and they did a chest x ray. Has taken Flonase, azelastine, and Zyrtec with some improvement. Feels very drowsy on azelastine. Currently take Pepcid and Nexium for acid reflux. Not sure if the medication is helping. Has trouble swallowing dry foods. No throat discomfort but will have a burning sensation after dinner. Beginning of symptoms started 3 weeks ago. Has mucus stuck in her throat. Past history of chemo for breast cancer.

## 2024-08-20 NOTE — REASON FOR VISIT
[Initial Evaluation] : an initial evaluation for [FreeTextEntry2] : chronic PND, acid reflux , chronic cough, hoarse voice

## 2024-08-20 NOTE — PROCEDURE
[Globus] : globus [Flexible Endoscope] : examined with the flexible endoscope [Normal] : the nasopharynx was normal [Arytenoid Erythema ___ /4] : arytenoid erythema [unfilled]U/4 [de-identified] : lryngospasm

## 2024-08-20 NOTE — HISTORY OF PRESENT ILLNESS
[de-identified] : Patient presents today c/o chronic PND, acid reflux , chronic cough, hoarse voice.  Patient states Monday 2am she woke up with a seal like bark and felt like she could not breathe.   She went to the ED on 08//19/2024 and they did a chest x ray. Has taken Flonase, azelastine, and Zyrtec with some improvement. Feels very drowsy on azelastine. Currently take Pepcid and Nexium for acid reflux. Not sure if the medication is helping. Has trouble swallowing dry foods. No throat discomfort but will have a burning sensation after dinner. Beginning of symptoms started 3 weeks ago. Has mucus stuck in her throat. Past history of chemo for breast cancer.

## 2024-08-20 NOTE — ASSESSMENT
[FreeTextEntry1] : Probable paroxysmal laryngospasm.  I recommended a hearing test given exposure to chemotherapy. Patient will do it next visit.  I discussed the pathophysiology of acid reflux and its effect on the laryngo-pharynx. I explained the need to first control the diet as much as possible with having early dinners, avoiding certain types of food in the evening including coffee, tomato sauce, chocolate, garlic... I also explained the need for medication when needed. I also discussed the effect and safety profile of the medication.  I advised patient to continue flonase and azelastine. Switch nexium to before dinner and famotidine in AM.  Part of history and discussion with patient's .  RTC in

## 2024-09-06 ENCOUNTER — RESULT REVIEW (OUTPATIENT)
Age: 64
End: 2024-09-06

## 2024-09-06 ENCOUNTER — TRANSCRIPTION ENCOUNTER (OUTPATIENT)
Age: 64
End: 2024-09-06

## 2024-09-06 ENCOUNTER — OUTPATIENT (OUTPATIENT)
Dept: OUTPATIENT SERVICES | Facility: HOSPITAL | Age: 64
LOS: 1 days | Discharge: HOME | End: 2024-09-06
Payer: COMMERCIAL

## 2024-09-06 VITALS
SYSTOLIC BLOOD PRESSURE: 128 MMHG | OXYGEN SATURATION: 98 % | RESPIRATION RATE: 18 BRPM | DIASTOLIC BLOOD PRESSURE: 65 MMHG | HEART RATE: 60 BPM

## 2024-09-06 VITALS
TEMPERATURE: 97 F | WEIGHT: 154.98 LBS | HEART RATE: 71 BPM | SYSTOLIC BLOOD PRESSURE: 133 MMHG | RESPIRATION RATE: 18 BRPM | DIASTOLIC BLOOD PRESSURE: 75 MMHG | HEIGHT: 65 IN

## 2024-09-06 DIAGNOSIS — Z98.890 OTHER SPECIFIED POSTPROCEDURAL STATES: Chronic | ICD-10-CM

## 2024-09-06 DIAGNOSIS — K21.9 GASTRO-ESOPHAGEAL REFLUX DISEASE WITHOUT ESOPHAGITIS: ICD-10-CM

## 2024-09-06 DIAGNOSIS — R10.9 UNSPECIFIED ABDOMINAL PAIN: ICD-10-CM

## 2024-09-06 PROCEDURE — 43239 EGD BIOPSY SINGLE/MULTIPLE: CPT

## 2024-09-06 PROCEDURE — 88312 SPECIAL STAINS GROUP 1: CPT

## 2024-09-06 PROCEDURE — 88305 TISSUE EXAM BY PATHOLOGIST: CPT | Mod: 26

## 2024-09-06 PROCEDURE — 88305 TISSUE EXAM BY PATHOLOGIST: CPT

## 2024-09-06 PROCEDURE — 88312 SPECIAL STAINS GROUP 1: CPT | Mod: 26

## 2024-09-06 RX ORDER — ESOMEPRAZOLE MAGNESIUM 40 MG/1
1 CAPSULE, DELAYED RELEASE ORAL
Refills: 0 | DISCHARGE

## 2024-09-06 RX ORDER — FLUTICASONE FUROATE 100 UG/1
0 POWDER RESPIRATORY (INHALATION)
Refills: 0 | DISCHARGE

## 2024-09-06 NOTE — H&P PST ADULT - HISTORY OF PRESENT ILLNESS
89 year old female patient average risk for CRC, fam hx of esophageal cancer, personal hx of colon TAs, hx of IBS and bloating, hx of colonoscopy in 2020 had 1 TA removed, and EGD in 2020 with grade 1 esophagitis and non HP gastritis. Now taking Nexium 20 mg in am, Pepcid 20 mg at hs with increased nausea over the past month without vomiting. She takes Mylanta prn with little relief. She also C/O epigastric pain described as a pressure, postprandial (ie chocolate) and occurring about 3 times a week. She takes Miralax QOD prn for chronic constipation and takes OTC suppositories with good response. She was experiencing diarrhea x 10 days until 3 weeks ago, was associated with lower abdominal cramping. She reported decreased bloating recently.   No weight loss, blood in the stools. She wakes up with nausea that is relieved by Nexium.   Newly diagnosed with breast cancer s/p radiation and chemotherapy June 2023, completed RT and Chemo 7/23  Pt had hx of heartburn, no dysphagia, no weight loss.

## 2024-09-06 NOTE — ASU PATIENT PROFILE, ADULT - FALL HARM RISK - UNIVERSAL INTERVENTIONS
Bed in lowest position, wheels locked, appropriate side rails in place/Call bell, personal items and telephone in reach/Instruct patient to call for assistance before getting out of bed or chair/Non-slip footwear when patient is out of bed/[place] to call system/Physically safe environment - no spills, clutter or unnecessary equipment/Purposeful Proactive Rounding/Room/bathroom lighting operational, light cord in reach

## 2024-09-06 NOTE — ASU DISCHARGE PLAN (ADULT/PEDIATRIC) - NS MD DC FALL RISK RISK
For information on Fall & Injury Prevention, visit: [email or web address] OR  [email or web address] OR  https://www.cdc.gov/steadi/patient.html

## 2024-09-06 NOTE — ASU PATIENT PROFILE, ADULT - NSICDXPASTMEDICALHX_GEN_ALL_CORE_FT
PAST MEDICAL HISTORY:  2019 novel coronavirus disease (COVID-19) 6/2022-prednisone RX    Asthma last use albuterol 9/2022-well controlled    Breast cancer     Diverticulitis     Hiatal hernia     History of left bundle branch block (LBBB)     Hyperthyroidism     IBS (irritable bowel syndrome)     MRSA infection right lung- 2010    Sleep apnea mild with dental device, no cpap machine

## 2024-09-06 NOTE — H&P PST ADULT - PATIENT'S SEXUAL ORIENTATION
Progress Note - Deb Locke 79 y o  female MRN: [id number]    Unit/Bed#: [facility] 509-01 Encounter: [id number]        Subjective:     " I am ok " The patient denies any abdominal pain or further signs of GI bleeding  She has not had a bowel movement today  She has been tolerating her clear liquid diet  Objective:     Vitals: Blood pressure 129/54, pulse 75, temperature 98 2 °F (36 8 °C), temperature source Oral, resp  rate 22, height 5' 2" (1 575 m), weight 97 5 kg (215 lb), SpO2 98 %  ,Body mass index is 39 32 kg/m²        Intake/Output Summary (Last 24 hours) at 08/04/18 1316  Last data filed at 08/04/18 1119   Gross per 24 hour   Intake          1528 92 ml   Output              680 ml   Net           848 92 ml       Physical Exam:     General Appearance: Alert, appears stated age and cooperative  Lungs: Clear to auscultation bilaterally, no rales or rhonchi, no labored breathing/accessory muscle use  Heart: Regular rate and rhythm, S1, S2 normal, no murmur, click, rub or gallop  Abdomen: Soft, obese non-tender, non-distended; bowel sounds normal; no masses or no organomegaly  Extremities: No cyanosis, edema    Invasive Devices     Peripheral Intravenous Line            Peripheral IV 08/02/18 Right Forearm 1 day    Peripheral IV 08/03/18 Left Arm 1 day                Lab Results:      Results from last 7 days  Lab Units 08/04/18  0450   WBC Thousand/uL 10 34*   HEMOGLOBIN g/dL 8 4*   HEMATOCRIT % 27 0*   PLATELETS Thousands/uL 561*   LYMPHO PCT % 10*   MONO PCT MAN % 3*   EOSINO PCT MANUAL % 4       Results from last 7 days  Lab Units 08/04/18  0450  08/02/18  1604   SODIUM mmol/L 141  < > 134*   POTASSIUM mmol/L 4 4  < > 4 7   CHLORIDE mmol/L 114*  < > 103   CO2 mmol/L 22  < > 23   BUN mg/dL 40*  < > 55*   CREATININE mg/dL 1 22  < > 1 43*   CALCIUM mg/dL 8 4  < > 8 9   TOTAL PROTEIN g/dL  --   --  6 5   BILIRUBIN TOTAL mg/dL  --   --  0 35   ALK PHOS U/L  --   --  109   ALT U/L  --   --  32   AST U/L  --   -- Withheld/Decline to Answer 30   GLUCOSE RANDOM mg/dL 143*  < > 317*   < > = values in this interval not displayed  Results from last 7 days  Lab Units 08/02/18  1604   INR  1 23*           Imaging Studies: I have personally reviewed pertinent imaging studies  Xr Chest 2 Views    Result Date: 8/2/2018  Impression: No acute cardiopulmonary disease  Workstation performed: [id number]     Ct Abdomen Pelvis With Contrast    Result Date: 8/2/2018  Impression: Suspect possible duodenitis  Fullness to the 2nd portion of the duodenum with suspected 1 4 cm duodenal diverticulum along the 2nd portion of the duodenum  With history of GI bleeding, consider direct visualization/endoscopy when clinically warranted to rule out pathology in this region  Workstation performed: [id number]       ASSESMENT/ PLAN:   Principal Problem:    Acute blood loss anemia  Active Problems:    Type 2 diabetes mellitus with diabetic polyneuropathy, without long-term current use of insulin (HCC)    Decubitus ulcer of buttock, stage 2    CKD (chronic kidney disease) stage 3, GFR 30-59 ml/min    GI bleed    Iron deficiency anemia due to chronic blood loss    1  Acute GI bleed secondary to duodenal ulcer status post epi injection and a clip in x2 on August 3, 2018  2   Anemia secondary to acute GI blood loss    3  Chronic kidney disease stage 3    4  Hiatal hernia and gastric polyps    -patient overall has symptomatic improvement  Hemoglobin is stable status post transfusion at 8 4     -continue to monitor hemoglobin level serially  Transfuse as needed     -will discontinue Protonix drip and in favor place her on b i d  dosing     -await stool H pylori antigen  - Agree with diet advancement, although would recommend soft diet, due to recent endo clipping  Will continue to be available for any questions or concerns  Please do not hesitate to contact us      Josefina Maria PAC     -

## 2024-09-06 NOTE — ASU PATIENT PROFILE, ADULT - NSICDXPASTSURGICALHX_GEN_ALL_CORE_FT
PAST SURGICAL HISTORY:  H/O colonoscopy x3    H/O endoscopy x2    H/O exploratory laparotomy at age 15-appendectomy, ovarian cystectomy    History of lumpectomy of right breast

## 2024-09-06 NOTE — ASU DISCHARGE PLAN (ADULT/PEDIATRIC) - CALL YOUR DOCTOR IF YOU HAVE ANY OF THE FOLLOWING:
Bleeding that does not stop/Pain not relieved by Medications/Excessive diarrhea/Inability to tolerate liquids or foods

## 2024-09-09 LAB — SURGICAL PATHOLOGY STUDY: SIGNIFICANT CHANGE UP

## 2024-09-10 DIAGNOSIS — E05.90 THYROTOXICOSIS, UNSPECIFIED WITHOUT THYROTOXIC CRISIS OR STORM: ICD-10-CM

## 2024-09-10 DIAGNOSIS — Z91.041 RADIOGRAPHIC DYE ALLERGY STATUS: ICD-10-CM

## 2024-09-10 DIAGNOSIS — Z85.3 PERSONAL HISTORY OF MALIGNANT NEOPLASM OF BREAST: ICD-10-CM

## 2024-09-10 DIAGNOSIS — K44.9 DIAPHRAGMATIC HERNIA WITHOUT OBSTRUCTION OR GANGRENE: ICD-10-CM

## 2024-09-10 DIAGNOSIS — Z79.51 LONG TERM (CURRENT) USE OF INHALED STEROIDS: ICD-10-CM

## 2024-09-10 DIAGNOSIS — Z88.0 ALLERGY STATUS TO PENICILLIN: ICD-10-CM

## 2024-09-10 DIAGNOSIS — Z86.16 PERSONAL HISTORY OF COVID-19: ICD-10-CM

## 2024-09-10 DIAGNOSIS — K29.50 UNSPECIFIED CHRONIC GASTRITIS WITHOUT BLEEDING: ICD-10-CM

## 2024-09-10 DIAGNOSIS — J45.909 UNSPECIFIED ASTHMA, UNCOMPLICATED: ICD-10-CM

## 2024-09-10 DIAGNOSIS — R10.13 EPIGASTRIC PAIN: ICD-10-CM

## 2024-09-10 DIAGNOSIS — G47.30 SLEEP APNEA, UNSPECIFIED: ICD-10-CM

## 2024-09-17 ENCOUNTER — NON-APPOINTMENT (OUTPATIENT)
Age: 64
End: 2024-09-17

## 2024-09-17 ENCOUNTER — APPOINTMENT (OUTPATIENT)
Dept: GASTROENTEROLOGY | Facility: CLINIC | Age: 64
End: 2024-09-17

## 2024-09-18 ENCOUNTER — APPOINTMENT (OUTPATIENT)
Dept: OTOLARYNGOLOGY | Facility: CLINIC | Age: 64
End: 2024-09-18
Payer: COMMERCIAL

## 2024-09-18 VITALS — HEIGHT: 65 IN | BODY MASS INDEX: 26.33 KG/M2 | WEIGHT: 158 LBS

## 2024-09-18 DIAGNOSIS — J38.5 LARYNGEAL SPASM: ICD-10-CM

## 2024-09-18 DIAGNOSIS — K21.9 GASTRO-ESOPHAGEAL REFLUX DISEASE W/OUT ESOPHAGITIS: ICD-10-CM

## 2024-09-18 PROBLEM — C50.919 MALIGNANT NEOPLASM OF UNSPECIFIED SITE OF UNSPECIFIED FEMALE BREAST: Chronic | Status: ACTIVE | Noted: 2024-09-06

## 2024-09-18 PROCEDURE — 92550 TYMPANOMETRY & REFLEX THRESH: CPT | Mod: 52

## 2024-09-18 PROCEDURE — 99213 OFFICE O/P EST LOW 20 MIN: CPT | Mod: 25

## 2024-09-18 PROCEDURE — 92557 COMPREHENSIVE HEARING TEST: CPT

## 2024-09-18 PROCEDURE — 31575 DIAGNOSTIC LARYNGOSCOPY: CPT

## 2024-09-18 NOTE — ASSESSMENT
[FreeTextEntry1] : Improved laryngospasm and voice.  Patient to continue nexium before dinner.  I reviewed, interpreted, and discussed the Audiogram done today. Mild SNHL.   Note from Dr William reviewed.

## 2024-09-18 NOTE — PROCEDURE
[Hoarseness] : hoarseness not clearly evaluated by indirect laryngoscopy [Globus] : globus [Flexible Endoscope] : examined with the flexible endoscope [Normal] : normal

## 2024-09-18 NOTE — REASON FOR VISIT
[Subsequent Evaluation] : a subsequent evaluation for [FreeTextEntry2] : hoarseness, trouble swallowing, gastroesophageal reflux disease, Laryngospasms

## 2024-09-18 NOTE — HISTORY OF PRESENT ILLNESS
[FreeTextEntry1] : Patient returns today for hoarseness, trouble swallowing, gastroesophageal reflux disease, Laryngospasms .  Patient states she has only had one Laryngospasm since last visit.  She has improvement since she took medication as prescribed. Only had one laryngospasm after she had an endoscopy. Feels like voice is coming back and sounding more normal. Was told she had chronic gastritis after endoscopy.

## 2024-11-06 ENCOUNTER — APPOINTMENT (OUTPATIENT)
Dept: OTOLARYNGOLOGY | Facility: CLINIC | Age: 64
End: 2024-11-06

## 2024-11-07 ENCOUNTER — APPOINTMENT (OUTPATIENT)
Dept: BREAST CENTER | Facility: CLINIC | Age: 64
End: 2024-11-07
Payer: COMMERCIAL

## 2024-11-07 VITALS
WEIGHT: 158 LBS | HEIGHT: 65 IN | DIASTOLIC BLOOD PRESSURE: 89 MMHG | BODY MASS INDEX: 26.33 KG/M2 | SYSTOLIC BLOOD PRESSURE: 136 MMHG

## 2024-11-07 DIAGNOSIS — C50.211 MALIGNANT NEOPLASM OF UPPER-INNER QUADRANT OF RIGHT FEMALE BREAST: ICD-10-CM

## 2024-11-07 DIAGNOSIS — N64.4 MASTODYNIA: ICD-10-CM

## 2024-11-07 DIAGNOSIS — Z17.1 MALIGNANT NEOPLASM OF UPPER-INNER QUADRANT OF RIGHT FEMALE BREAST: ICD-10-CM

## 2024-11-07 DIAGNOSIS — Z98.890 OTHER SPECIFIED POSTPROCEDURAL STATES: ICD-10-CM

## 2024-11-07 PROCEDURE — 99213 OFFICE O/P EST LOW 20 MIN: CPT

## 2024-11-26 ENCOUNTER — APPOINTMENT (OUTPATIENT)
Dept: OTOLARYNGOLOGY | Facility: CLINIC | Age: 64
End: 2024-11-26

## 2024-12-18 ENCOUNTER — APPOINTMENT (OUTPATIENT)
Dept: PULMONOLOGY | Facility: CLINIC | Age: 64
End: 2024-12-18
Payer: COMMERCIAL

## 2024-12-18 VITALS
BODY MASS INDEX: 26.13 KG/M2 | WEIGHT: 157 LBS | RESPIRATION RATE: 14 BRPM | HEART RATE: 88 BPM | OXYGEN SATURATION: 98 %

## 2024-12-18 DIAGNOSIS — J45.909 UNSPECIFIED ASTHMA, UNCOMPLICATED: ICD-10-CM

## 2024-12-18 DIAGNOSIS — R09.81 NASAL CONGESTION: ICD-10-CM

## 2024-12-18 PROCEDURE — 94727 GAS DIL/WSHOT DETER LNG VOL: CPT

## 2024-12-18 PROCEDURE — 94010 BREATHING CAPACITY TEST: CPT

## 2024-12-18 PROCEDURE — 94729 DIFFUSING CAPACITY: CPT

## 2024-12-18 PROCEDURE — G2211 COMPLEX E/M VISIT ADD ON: CPT | Mod: NC

## 2024-12-18 PROCEDURE — 99214 OFFICE O/P EST MOD 30 MIN: CPT | Mod: 25

## 2025-01-13 ENCOUNTER — APPOINTMENT (OUTPATIENT)
Dept: OTOLARYNGOLOGY | Facility: CLINIC | Age: 65
End: 2025-01-13
Payer: COMMERCIAL

## 2025-01-13 VITALS — HEIGHT: 65 IN | BODY MASS INDEX: 24.32 KG/M2 | WEIGHT: 146 LBS

## 2025-01-13 DIAGNOSIS — J38.5 LARYNGEAL SPASM: ICD-10-CM

## 2025-01-13 DIAGNOSIS — K21.9 GASTRO-ESOPHAGEAL REFLUX DISEASE W/OUT ESOPHAGITIS: ICD-10-CM

## 2025-01-13 PROCEDURE — 99213 OFFICE O/P EST LOW 20 MIN: CPT | Mod: 25

## 2025-01-13 PROCEDURE — 31575 DIAGNOSTIC LARYNGOSCOPY: CPT

## 2025-01-21 ENCOUNTER — OUTPATIENT (OUTPATIENT)
Dept: OUTPATIENT SERVICES | Facility: HOSPITAL | Age: 65
LOS: 1 days | End: 2025-01-21
Payer: COMMERCIAL

## 2025-01-21 ENCOUNTER — RESULT REVIEW (OUTPATIENT)
Age: 65
End: 2025-01-21

## 2025-01-21 DIAGNOSIS — C50.211 MALIGNANT NEOPLASM OF UPPER-INNER QUADRANT OF RIGHT FEMALE BREAST: ICD-10-CM

## 2025-01-21 DIAGNOSIS — R92.8 OTHER ABNORMAL AND INCONCLUSIVE FINDINGS ON DIAGNOSTIC IMAGING OF BREAST: ICD-10-CM

## 2025-01-21 DIAGNOSIS — Z98.890 OTHER SPECIFIED POSTPROCEDURAL STATES: Chronic | ICD-10-CM

## 2025-01-21 PROCEDURE — 76642 ULTRASOUND BREAST LIMITED: CPT | Mod: 26,RT

## 2025-01-21 PROCEDURE — 77065 DX MAMMO INCL CAD UNI: CPT | Mod: 26,RT

## 2025-01-21 PROCEDURE — 76642 ULTRASOUND BREAST LIMITED: CPT | Mod: RT

## 2025-01-21 PROCEDURE — 77065 DX MAMMO INCL CAD UNI: CPT | Mod: RT

## 2025-01-21 PROCEDURE — 77061 BREAST TOMOSYNTHESIS UNI: CPT | Mod: 26

## 2025-01-21 PROCEDURE — G0279: CPT

## 2025-01-21 PROCEDURE — G0279: CPT | Mod: 26

## 2025-01-22 DIAGNOSIS — R92.8 OTHER ABNORMAL AND INCONCLUSIVE FINDINGS ON DIAGNOSTIC IMAGING OF BREAST: ICD-10-CM

## 2025-01-28 ENCOUNTER — APPOINTMENT (OUTPATIENT)
Dept: BREAST CENTER | Facility: CLINIC | Age: 65
End: 2025-01-28
Payer: COMMERCIAL

## 2025-01-28 VITALS
SYSTOLIC BLOOD PRESSURE: 152 MMHG | HEART RATE: 79 BPM | WEIGHT: 146 LBS | BODY MASS INDEX: 24.32 KG/M2 | HEIGHT: 65 IN | DIASTOLIC BLOOD PRESSURE: 69 MMHG

## 2025-01-28 DIAGNOSIS — C50.211 MALIGNANT NEOPLASM OF UPPER-INNER QUADRANT OF RIGHT FEMALE BREAST: ICD-10-CM

## 2025-01-28 DIAGNOSIS — Z17.1 MALIGNANT NEOPLASM OF UPPER-INNER QUADRANT OF RIGHT FEMALE BREAST: ICD-10-CM

## 2025-01-28 DIAGNOSIS — Z98.890 OTHER SPECIFIED POSTPROCEDURAL STATES: ICD-10-CM

## 2025-01-28 PROCEDURE — 99214 OFFICE O/P EST MOD 30 MIN: CPT

## 2025-04-28 ENCOUNTER — APPOINTMENT (OUTPATIENT)
Dept: OTOLARYNGOLOGY | Facility: CLINIC | Age: 65
End: 2025-04-28
Payer: COMMERCIAL

## 2025-04-28 VITALS — BODY MASS INDEX: 24.66 KG/M2 | HEIGHT: 65 IN | WEIGHT: 148 LBS

## 2025-04-28 DIAGNOSIS — J38.5 LARYNGEAL SPASM: ICD-10-CM

## 2025-04-28 DIAGNOSIS — R09.82 POSTNASAL DRIP: ICD-10-CM

## 2025-04-28 DIAGNOSIS — K21.9 GASTRO-ESOPHAGEAL REFLUX DISEASE W/OUT ESOPHAGITIS: ICD-10-CM

## 2025-04-28 PROCEDURE — 99214 OFFICE O/P EST MOD 30 MIN: CPT | Mod: 25

## 2025-04-28 PROCEDURE — 31575 DIAGNOSTIC LARYNGOSCOPY: CPT

## 2025-06-16 ENCOUNTER — APPOINTMENT (OUTPATIENT)
Dept: PULMONOLOGY | Facility: CLINIC | Age: 65
End: 2025-06-16
Payer: COMMERCIAL

## 2025-06-16 VITALS
HEART RATE: 78 BPM | DIASTOLIC BLOOD PRESSURE: 58 MMHG | OXYGEN SATURATION: 98 % | SYSTOLIC BLOOD PRESSURE: 124 MMHG | WEIGHT: 147 LBS | BODY MASS INDEX: 24.46 KG/M2

## 2025-06-16 PROCEDURE — 99214 OFFICE O/P EST MOD 30 MIN: CPT

## 2025-06-16 PROCEDURE — G2211 COMPLEX E/M VISIT ADD ON: CPT | Mod: NC

## 2025-07-23 ENCOUNTER — RESULT REVIEW (OUTPATIENT)
Age: 65
End: 2025-07-23

## 2025-07-23 ENCOUNTER — OUTPATIENT (OUTPATIENT)
Dept: OUTPATIENT SERVICES | Facility: HOSPITAL | Age: 65
LOS: 1 days | End: 2025-07-23
Payer: COMMERCIAL

## 2025-07-23 DIAGNOSIS — Z98.890 OTHER SPECIFIED POSTPROCEDURAL STATES: Chronic | ICD-10-CM

## 2025-07-23 DIAGNOSIS — R92.8 OTHER ABNORMAL AND INCONCLUSIVE FINDINGS ON DIAGNOSTIC IMAGING OF BREAST: ICD-10-CM

## 2025-07-23 DIAGNOSIS — C50.211 MALIGNANT NEOPLASM OF UPPER-INNER QUADRANT OF RIGHT FEMALE BREAST: ICD-10-CM

## 2025-07-23 PROCEDURE — G0279: CPT

## 2025-07-23 PROCEDURE — 77066 DX MAMMO INCL CAD BI: CPT | Mod: 26

## 2025-07-23 PROCEDURE — 77066 DX MAMMO INCL CAD BI: CPT

## 2025-07-23 PROCEDURE — 76641 ULTRASOUND BREAST COMPLETE: CPT | Mod: 50

## 2025-07-23 PROCEDURE — G0279: CPT | Mod: 26

## 2025-07-23 PROCEDURE — 77062 BREAST TOMOSYNTHESIS BI: CPT | Mod: 26

## 2025-07-23 PROCEDURE — 76641 ULTRASOUND BREAST COMPLETE: CPT | Mod: 26,50

## 2025-07-24 DIAGNOSIS — R92.8 OTHER ABNORMAL AND INCONCLUSIVE FINDINGS ON DIAGNOSTIC IMAGING OF BREAST: ICD-10-CM

## 2025-08-04 ENCOUNTER — NON-APPOINTMENT (OUTPATIENT)
Age: 65
End: 2025-08-04

## 2025-08-04 ENCOUNTER — APPOINTMENT (OUTPATIENT)
Dept: OTOLARYNGOLOGY | Facility: CLINIC | Age: 65
End: 2025-08-04
Payer: COMMERCIAL

## 2025-08-04 VITALS — WEIGHT: 148 LBS | BODY MASS INDEX: 24.66 KG/M2 | HEIGHT: 65 IN

## 2025-08-04 DIAGNOSIS — R06.83 SNORING: ICD-10-CM

## 2025-08-04 DIAGNOSIS — K21.9 GASTRO-ESOPHAGEAL REFLUX DISEASE W/OUT ESOPHAGITIS: ICD-10-CM

## 2025-08-04 DIAGNOSIS — R12 HEARTBURN: ICD-10-CM

## 2025-08-04 DIAGNOSIS — R09.82 POSTNASAL DRIP: ICD-10-CM

## 2025-08-04 DIAGNOSIS — R09.81 NASAL CONGESTION: ICD-10-CM

## 2025-08-04 DIAGNOSIS — J38.5 LARYNGEAL SPASM: ICD-10-CM

## 2025-08-04 DIAGNOSIS — R49.0 DYSPHONIA: ICD-10-CM

## 2025-08-04 PROCEDURE — 31575 DIAGNOSTIC LARYNGOSCOPY: CPT

## 2025-08-04 PROCEDURE — 99214 OFFICE O/P EST MOD 30 MIN: CPT | Mod: 25

## 2025-08-21 ENCOUNTER — APPOINTMENT (OUTPATIENT)
Dept: SLEEP CENTER | Facility: HOSPITAL | Age: 65
End: 2025-08-21

## 2025-08-21 ENCOUNTER — OUTPATIENT (OUTPATIENT)
Dept: OUTPATIENT SERVICES | Facility: HOSPITAL | Age: 65
LOS: 1 days | Discharge: HOME | End: 2025-08-21
Payer: COMMERCIAL

## 2025-08-21 DIAGNOSIS — Z98.890 OTHER SPECIFIED POSTPROCEDURAL STATES: Chronic | ICD-10-CM

## 2025-08-21 DIAGNOSIS — G47.33 OBSTRUCTIVE SLEEP APNEA (ADULT) (PEDIATRIC): ICD-10-CM

## 2025-08-21 PROCEDURE — 95800 SLP STDY UNATTENDED: CPT

## 2025-08-23 DIAGNOSIS — G47.33 OBSTRUCTIVE SLEEP APNEA (ADULT) (PEDIATRIC): ICD-10-CM

## 2025-09-02 ENCOUNTER — APPOINTMENT (OUTPATIENT)
Dept: BREAST CENTER | Facility: CLINIC | Age: 65
End: 2025-09-02
Payer: COMMERCIAL

## 2025-09-02 VITALS
SYSTOLIC BLOOD PRESSURE: 122 MMHG | DIASTOLIC BLOOD PRESSURE: 55 MMHG | WEIGHT: 148 LBS | HEART RATE: 69 BPM | BODY MASS INDEX: 24.66 KG/M2 | HEIGHT: 65 IN

## 2025-09-02 DIAGNOSIS — Z91.89 OTHER SPECIFIED PERSONAL RISK FACTORS, NOT ELSEWHERE CLASSIFIED: ICD-10-CM

## 2025-09-02 DIAGNOSIS — R92.30 DENSE BREASTS, UNSPECIFIED: ICD-10-CM

## 2025-09-02 DIAGNOSIS — C50.211 MALIGNANT NEOPLASM OF UPPER-INNER QUADRANT OF RIGHT FEMALE BREAST: ICD-10-CM

## 2025-09-02 DIAGNOSIS — Z17.1 MALIGNANT NEOPLASM OF UPPER-INNER QUADRANT OF RIGHT FEMALE BREAST: ICD-10-CM

## 2025-09-02 DIAGNOSIS — Z98.890 OTHER SPECIFIED POSTPROCEDURAL STATES: ICD-10-CM

## 2025-09-02 PROCEDURE — 99215 OFFICE O/P EST HI 40 MIN: CPT

## 2025-09-02 PROCEDURE — G2211 COMPLEX E/M VISIT ADD ON: CPT | Mod: NC

## 2025-09-02 PROCEDURE — 93702 BIS XTRACELL FLUID ANALYSIS: CPT | Mod: NC

## 2025-09-20 ENCOUNTER — NON-APPOINTMENT (OUTPATIENT)
Age: 65
End: 2025-09-20

## 2025-09-24 PROBLEM — G47.33 OSA (OBSTRUCTIVE SLEEP APNEA): Status: ACTIVE | Noted: 2025-09-24
